# Patient Record
Sex: FEMALE | Race: WHITE | NOT HISPANIC OR LATINO | Employment: OTHER | URBAN - METROPOLITAN AREA
[De-identification: names, ages, dates, MRNs, and addresses within clinical notes are randomized per-mention and may not be internally consistent; named-entity substitution may affect disease eponyms.]

---

## 2017-08-21 ENCOUNTER — HOSPITAL ENCOUNTER (EMERGENCY)
Facility: HOSPITAL | Age: 77
Discharge: HOME | End: 2017-08-21
Attending: EMERGENCY MEDICINE | Admitting: EMERGENCY MEDICINE
Payer: MEDICARE

## 2017-08-21 VITALS
WEIGHT: 135 LBS | TEMPERATURE: 98.3 F | BODY MASS INDEX: 19.99 KG/M2 | RESPIRATION RATE: 16 BRPM | HEART RATE: 80 BPM | HEIGHT: 69 IN | SYSTOLIC BLOOD PRESSURE: 110 MMHG | DIASTOLIC BLOOD PRESSURE: 55 MMHG | OXYGEN SATURATION: 97 %

## 2017-08-21 DIAGNOSIS — I83.891 VARICOSE VEINS OF RIGHT LOWER EXTREMITY WITH EDEMA: Primary | ICD-10-CM

## 2017-08-21 LAB — DEPRECATED D DIMER PPP: <270 NG/ML (FEU) (ref 0–424)

## 2017-08-21 PROCEDURE — 36415 COLL VENOUS BLD VENIPUNCTURE: CPT | Performed by: EMERGENCY MEDICINE

## 2017-08-21 PROCEDURE — 85379 FIBRIN DEGRADATION QUANT: CPT | Performed by: EMERGENCY MEDICINE

## 2017-08-21 PROCEDURE — 99284 EMERGENCY DEPT VISIT MOD MDM: CPT

## 2017-08-21 RX ORDER — ACETAMINOPHEN 325 MG/1
975 TABLET ORAL ONCE
Status: COMPLETED | OUTPATIENT
Start: 2017-08-21 | End: 2017-08-21

## 2017-08-21 RX ORDER — IBUPROFEN 400 MG/1
400 TABLET ORAL ONCE
Status: COMPLETED | OUTPATIENT
Start: 2017-08-21 | End: 2017-08-21

## 2017-08-21 RX ADMIN — IBUPROFEN 400 MG: 400 TABLET ORAL at 23:00

## 2017-08-21 RX ADMIN — ACETAMINOPHEN 975 MG: 325 TABLET ORAL at 22:59

## 2019-09-18 ENCOUNTER — OFFICE VISIT (OUTPATIENT)
Dept: URGENT CARE | Facility: CLINIC | Age: 79
End: 2019-09-18
Payer: COMMERCIAL

## 2019-09-18 VITALS
OXYGEN SATURATION: 94 % | BODY MASS INDEX: 19.7 KG/M2 | WEIGHT: 130 LBS | HEIGHT: 68 IN | RESPIRATION RATE: 18 BRPM | TEMPERATURE: 97.2 F | SYSTOLIC BLOOD PRESSURE: 114 MMHG | DIASTOLIC BLOOD PRESSURE: 58 MMHG | HEART RATE: 71 BPM

## 2019-09-18 DIAGNOSIS — L03.115 CELLULITIS OF RIGHT LOWER EXTREMITY: ICD-10-CM

## 2019-09-18 DIAGNOSIS — R21 RASH: Primary | ICD-10-CM

## 2019-09-18 PROCEDURE — 99203 OFFICE O/P NEW LOW 30 MIN: CPT | Performed by: PHYSICIAN ASSISTANT

## 2019-09-18 PROCEDURE — S9083 URGENT CARE CENTER GLOBAL: HCPCS | Performed by: PHYSICIAN ASSISTANT

## 2019-09-18 RX ORDER — CEPHALEXIN 500 MG/1
500 CAPSULE ORAL EVERY 6 HOURS SCHEDULED
Qty: 28 CAPSULE | Refills: 0 | Status: SHIPPED | OUTPATIENT
Start: 2019-09-18 | End: 2019-09-25

## 2019-09-18 RX ORDER — PREDNISONE 10 MG/1
TABLET ORAL
Qty: 18 TABLET | Refills: 0 | Status: SHIPPED | OUTPATIENT
Start: 2019-09-18 | End: 2021-07-20 | Stop reason: ALTCHOICE

## 2019-09-18 NOTE — PROGRESS NOTES
[facility]        NAME: Tuan Agudelo is a 78 y o  female  : 1940    MRN: [id number]  DATE: 2019  TIME: 5:33 PM    Assessment and Plan   Rash [R21]  1  Rash  predniSONE 10 mg tablet   2  Cellulitis of right lower extremity  cephalexin (KEFLEX) 500 mg capsule         Patient Instructions     Patient Instructions   Continue benadryl for itch, can add zyrtec  If worse go to ER  Follow up with PCP in 1 week  Appears to be allergic reaction  She does have some cellulitis over the lower leg this is likely secondary to her venous stasis  Follow up with PCP in 3-5 days  Proceed to  ER if symptoms worsen  Chief Complaint     Chief Complaint   Patient presents with    Facial Swelling     x 3 days becoming more severe today    Leg Swelling     right leg +erythema and edema with peeling skin    Rash     genralized raised itchy rash         History of Present Illness         58-year-old female presents to the clinic with itching rash on bilateral upper extremities her neck and some swelling on her eyes  She also has a red patch on her right lower leg  She has a history of right lower leg venous stasis with some cellulitis last year  She has not benign medicines for this recently  No topical creams  No new medicines  No new exposures or soaps  No new foods  She has been eating tomatoes but she eats tomatoes every year with her garden  No trouble swallowing or breathing  Review of Systems   Review of Systems   Constitutional: Negative  HENT: Negative  Respiratory: Negative  Cardiovascular: Negative  Gastrointestinal: Negative  Skin: Positive for rash           Current Medications       Current Outpatient Medications:     cephalexin (KEFLEX) 500 mg capsule, Take 1 capsule (500 mg total) by mouth every 6 (six) hours for 7 days, Disp: 28 capsule, Rfl: 0    predniSONE 10 mg tablet, 3 tabs daily for 3 days, 2 tabs daily for 3 days, 1 tab daily for 3 days, Disp: 18 tablet, Rfl: 0    Current Allergies     Allergies as of 09/18/2019    (No Known Allergies)            The following portions of the patient's history were reviewed and updated as appropriate: allergies, current medications, past family history, past medical history, past social history, past surgical history and problem list      History reviewed  No pertinent past medical history  Past Surgical History:   Procedure Laterality Date   [address] [facility]       History reviewed  No pertinent family history  Medications have been verified  Objective   /58 (BP Location: Left arm)   Pulse 71   Temp (!) 97 2 °F (36 2 °C) (Temporal)   Resp 18   Ht 5' 8" (1 727 m)   Wt 59 kg (130 lb)   SpO2 94%   BMI 19 77 kg/m²        Physical Exam     Physical Exam   Constitutional: She is oriented to person, place, and time  She appears well-developed and well-nourished  No distress  Eyes: Pupils are equal, round, and reactive to light  Conjunctivae and EOM are normal    Neck: Normal range of motion  Neck supple  Cardiovascular: Normal rate and regular rhythm  Pulmonary/Chest: Effort normal and breath sounds normal    Neurological: She is alert and oriented to person, place, and time  No cranial nerve deficit  Skin:     Bilateral cheeks and lower lids with erythematous papules and edema  Bilateral upper extremities and lower extremities with erythematous papules no vesicles  She has erythematous papules around her neck  No vesicles  She has a right lower leg large area of excoriation induration erythema warmth and tenderness  There are no pustules or wounds  There is a well-healed scab on the right medial malleolus  No drainage expressed from that scab no tenderness

## 2019-09-27 ENCOUNTER — OFFICE VISIT (OUTPATIENT)
Dept: URGENT CARE | Facility: CLINIC | Age: 79
End: 2019-09-27
Payer: COMMERCIAL

## 2019-09-27 VITALS
WEIGHT: 130 LBS | OXYGEN SATURATION: 96 % | SYSTOLIC BLOOD PRESSURE: 116 MMHG | DIASTOLIC BLOOD PRESSURE: 61 MMHG | TEMPERATURE: 97.6 F | HEART RATE: 93 BPM | RESPIRATION RATE: 18 BRPM | BODY MASS INDEX: 19.7 KG/M2 | HEIGHT: 68 IN

## 2019-09-27 DIAGNOSIS — L03.115 CELLULITIS OF RIGHT ANTERIOR LOWER LEG: Primary | ICD-10-CM

## 2019-09-27 PROCEDURE — 99213 OFFICE O/P EST LOW 20 MIN: CPT | Performed by: PHYSICIAN ASSISTANT

## 2019-09-27 PROCEDURE — S9083 URGENT CARE CENTER GLOBAL: HCPCS | Performed by: PHYSICIAN ASSISTANT

## 2019-09-27 RX ORDER — NYSTATIN 100000 U/G
CREAM TOPICAL 2 TIMES DAILY
COMMUNITY
Start: 2018-12-10 | End: 2021-07-28

## 2019-09-27 RX ORDER — CEPHALEXIN 500 MG/1
500 CAPSULE ORAL EVERY 6 HOURS SCHEDULED
Qty: 28 CAPSULE | Refills: 0 | Status: SHIPPED | OUTPATIENT
Start: 2019-09-27 | End: 2019-10-04

## 2019-09-27 NOTE — PROGRESS NOTES
[facility]        NAME: David Chang is a 78 y o  female  : 1940    MRN: [id number]  DATE: 2019  TIME: 10:36 AM    Assessment and Plan   Cellulitis of right anterior lower leg [L03 115]  1  Cellulitis of right anterior lower leg  cephalexin (KEFLEX) 500 mg capsule         Patient Instructions     Take antibiotic as directed until completed  Motrin and/or Tylenol as needed for aches and pains or any fevers  Follow up with PCP in 3-5 days  Proceed to  ER if symptoms worsen  Chief Complaint     Chief Complaint   Patient presents with    Rash     Pt states that she was in 1 week ago with a rash on her right lower leg, face, neck that is still not getting better  History of Present Illness       75-year-old female presents with rash to right lower leg on the anterior aspect  Reports that the area got red and has increasingly gotten larger over the past couple days  However swelling has improved  Denies any numbness or tingling into extremity  No fevers or chills reported  Denies any chest pain shortness of breath  Denies any abdominal pain nausea vomiting or diarrhea  Rash   This is a new problem  The current episode started 1 to 4 weeks ago  The problem has been gradually worsening since onset  Location: Right lower leg  The rash is characterized by pain and redness  She was exposed to nothing  Pertinent negatives include no fever  Past treatments include nothing  The treatment provided no relief  Review of Systems   Review of Systems   Constitutional: Negative  Negative for fever  Respiratory: Negative  Cardiovascular: Negative  Gastrointestinal: Negative  Musculoskeletal: Negative  Skin: Positive for rash  Neurological: Negative            Current Medications       Current Outpatient Medications:     cephalexin (KEFLEX) 500 mg capsule, Take 1 capsule (500 mg total) by mouth every 6 (six) hours for 7 days, Disp: 28 capsule, Rfl: 0   nystatin (MYCOSTATIN) cream, Apply topically 2 (two) times a day, Disp: , Rfl:     predniSONE 10 mg tablet, 3 tabs daily for 3 days, 2 tabs daily for 3 days, 1 tab daily for 3 days (Patient not taking: Reported on 9/27/2019), Disp: 18 tablet, Rfl: 0    Current Allergies     Allergies as of 09/27/2019    (No Known Allergies)            The following portions of the patient's history were reviewed and updated as appropriate: allergies, current medications, past family history, past medical history, past social history, past surgical history and problem list      History reviewed  No pertinent past medical history  Past Surgical History:   Procedure Laterality Date   [address] [facility]       History reviewed  No pertinent family history  Medications have been verified  Objective   /61   Pulse 93   Temp 97 6 °F (36 4 °C)   Resp 18   Ht 5' 8" (1 727 m)   Wt 59 kg (130 lb)   SpO2 96%   BMI 19 77 kg/m²        Physical Exam     Physical Exam   Constitutional: She is oriented to person, place, and time  She appears well-developed and well-nourished  No distress  HENT:   Head: Normocephalic and atraumatic  Right Ear: External ear normal    Left Ear: External ear normal    Nose: Nose normal    Mouth/Throat: Oropharynx is clear and moist  No oropharyngeal exudate  Eyes: Conjunctivae are normal  Right eye exhibits no discharge  Left eye exhibits no discharge  Neck: Normal range of motion  Neck supple  Cardiovascular: Intact distal pulses  Pulmonary/Chest: Effort normal  No respiratory distress  Musculoskeletal: Normal range of motion  Right lower leg: She exhibits tenderness  She exhibits no bony tenderness, no swelling, no edema, no deformity and no laceration  Legs:  Neurological: She is alert and oriented to person, place, and time  Skin: Skin is warm and dry  Rash noted  There is erythema  Psychiatric: She has a normal mood and affect     Nursing note and vitals reviewed

## 2019-09-27 NOTE — PATIENT INSTRUCTIONS
Take antibiotic as directed until completed  Motrin and/or Tylenol as needed for aches and pains or any fevers  Follow up with PCP in 3-5 days  Proceed to  ER if symptoms worsen  Cellulitis   AMBULATORY CARE:   Cellulitis  is a skin infection caused by bacteria  Cellulitis usually appears on the legs and feet, arms and hands, or face  Common signs and symptoms include the following:   · A red, warm, swollen area on your skin    · Pain when the area is touched    · Bumps or blisters (abscess) that may drain pus    · Bumpy, raised skin that feels like an orange peel  Call 911 if:   · You have sudden trouble breathing or chest pain  Seek care immediately if:   · Your wound gets larger and more painful  · You feel a crackling under your skin when you touch it  · You have purple dots or bumps on your skin, or you see bleeding under your skin  · You have new swelling and pain in your legs  · The red, warm, swollen area gets larger  · You see red streaks coming from the infected area  Contact your healthcare provider if:   · You have a fever  · Your fever or pain does not go away or gets worse  · The area does not get smaller after 2 days of antibiotics  · Your skin is flaking or peeling off  · You have questions or concerns about your condition or care  Treatment for cellulitis  may decrease symptoms, stop the infection from spreading, and cure the infection  Treatment depends on how severe your cellulitis is  Cellulitis may go away on its own  You may  instead need antibiotics to help treat the bacterial infection and medicines for pain  Your healthcare provider may draw a [place] around the edges of your cellulitis  If your cellulitis spreads, your healthcare provider will see it outside of the [place]  Manage your symptoms:   · Elevate the area above the level of your heart  as often as you can  This will help decrease swelling and pain   Prop the area on pillows or blankets to keep it elevated comfortably  · Clean the area daily until the wound scabs over  Gently wash the area with soap and water  Pat dry  Use dressings as directed  · Place cool or warm, wet cloths on the area as directed  Use clean cloths and clean water  Leave it on the area until the cloth is room temperature  Pat the area dry with a clean, dry cloth  The cloths may help decrease pain  Prevent cellulitis:   · Do not scratch bug bites or areas of injury  You increase your risk for cellulitis by scratching these areas  · Do not share personal items, such as towels, clothing, and razors  · Clean exercise equipment  with germ-killing  before and after you use it  · Wash your hands often  Use soap and water  Wash your hands after you use the bathroom, change a child's diapers, or sneeze  Wash your hands before you prepare or eat food  Use lotion to prevent dry, cracked skin  · Wear pressure stockings as directed  You may be told to wear the stockings if you have peripheral edema  The stockings improve blood flow and decrease swelling  · Treat athlete's foot  This can help prevent the spread of a bacterial skin infection  Follow up with your healthcare provider within 3 days, or as directed: Your healthcare provider will check if your cellulitis is getting better  You may need different medicine  Write down your questions so you remember to ask them during your visits  © 2017 [employer] Information is for End User's use only and may not be sold, redistributed or otherwise used for commercial purposes  All illustrations and images included in CareNotes® are the copyrighted property of [employer]  or Shadi Viveros  The above information is an  only  It is not intended as medical advice for individual conditions or treatments   Talk to your doctor, nurse or pharmacist before following any medical regimen to see if it is safe and effective for you

## 2020-02-12 ENCOUNTER — TRANSCRIBE ORDERS (OUTPATIENT)
Dept: MRI IMAGING | Facility: CLINIC | Age: 80
End: 2020-02-12

## 2020-02-12 DIAGNOSIS — F03.90 SENILE DEMENTIA, UNCOMPLICATED (HCC): Primary | ICD-10-CM

## 2020-02-19 ENCOUNTER — HOSPITAL ENCOUNTER (OUTPATIENT)
Dept: MRI IMAGING | Facility: CLINIC | Age: 80
Discharge: HOME | End: 2020-02-19
Payer: COMMERCIAL

## 2020-02-19 DIAGNOSIS — F03.90 SENILE DEMENTIA, UNCOMPLICATED (HCC): ICD-10-CM

## 2020-02-19 PROCEDURE — 70551 MRI BRAIN STEM W/O DYE: CPT

## 2021-02-12 DIAGNOSIS — Z23 ENCOUNTER FOR IMMUNIZATION: ICD-10-CM

## 2021-05-06 ENCOUNTER — APPOINTMENT (EMERGENCY)
Dept: CT IMAGING | Facility: HOSPITAL | Age: 81
End: 2021-05-06
Payer: MEDICARE

## 2021-05-06 ENCOUNTER — HOSPITAL ENCOUNTER (EMERGENCY)
Facility: HOSPITAL | Age: 81
Discharge: HOME | End: 2021-05-07
Attending: EMERGENCY MEDICINE | Admitting: EMERGENCY MEDICINE
Payer: MEDICARE

## 2021-05-06 DIAGNOSIS — N20.0 NEPHROLITHIASIS: Primary | ICD-10-CM

## 2021-05-06 LAB
ALBUMIN SERPL BCP-MCNC: 4.1 G/DL (ref 3.5–5)
ALP SERPL-CCNC: 83 U/L (ref 46–116)
ALT SERPL W P-5'-P-CCNC: 40 U/L (ref 12–78)
ANION GAP SERPL CALCULATED.3IONS-SCNC: 8 MMOL/L (ref 4–13)
AST SERPL W P-5'-P-CCNC: 32 U/L (ref 5–45)
ATRIAL RATE: 64 BPM
BASOPHILS # BLD AUTO: 0.05 THOUSANDS/ΜL (ref 0–0.1)
BASOPHILS NFR BLD AUTO: 0 % (ref 0–1)
BILIRUB SERPL-MCNC: 0.65 MG/DL (ref 0.2–1)
BUN SERPL-MCNC: 26 MG/DL (ref 5–25)
CALCIUM SERPL-MCNC: 9.5 MG/DL (ref 8.3–10.1)
CHLORIDE SERPL-SCNC: 106 MMOL/L (ref 100–108)
CO2 SERPL-SCNC: 29 MMOL/L (ref 21–32)
CREAT SERPL-MCNC: 0.81 MG/DL (ref 0.6–1.3)
EOSINOPHIL # BLD AUTO: 0.03 THOUSAND/ΜL (ref 0–0.61)
EOSINOPHIL NFR BLD AUTO: 0 % (ref 0–6)
ERYTHROCYTE [DISTWIDTH] IN BLOOD BY AUTOMATED COUNT: 14.1 % (ref 11.6–15.1)
GFR SERPL CREATININE-BSD FRML MDRD: 67 ML/MIN/1.73SQ M
GLUCOSE SERPL-MCNC: 131 MG/DL (ref 65–140)
HCT VFR BLD AUTO: 39.1 % (ref 34.8–46.1)
HGB BLD-MCNC: 13.1 G/DL (ref 11.5–15.4)
IMM GRANULOCYTES # BLD AUTO: 0.06 THOUSAND/UL (ref 0–0.2)
IMM GRANULOCYTES NFR BLD AUTO: 1 % (ref 0–2)
LACTATE SERPL-SCNC: 0.8 MMOL/L (ref 0.5–2)
LIPASE SERPL-CCNC: 102 U/L (ref 73–393)
LYMPHOCYTES # BLD AUTO: 0.76 THOUSANDS/ΜL (ref 0.6–4.47)
LYMPHOCYTES NFR BLD AUTO: 6 % (ref 14–44)
MCH RBC QN AUTO: 33.2 PG (ref 26.8–34.3)
MCHC RBC AUTO-ENTMCNC: 33.5 G/DL (ref 31.4–37.4)
MCV RBC AUTO: 99 FL (ref 82–98)
MONOCYTES # BLD AUTO: 0.81 THOUSAND/ΜL (ref 0.17–1.22)
MONOCYTES NFR BLD AUTO: 7 % (ref 4–12)
NEUTROPHILS # BLD AUTO: 10.49 THOUSANDS/ΜL (ref 1.85–7.62)
NEUTS SEG NFR BLD AUTO: 86 % (ref 43–75)
NRBC BLD AUTO-RTO: 0 /100 WBCS
PLATELET # BLD AUTO: 149 THOUSANDS/UL (ref 149–390)
PMV BLD AUTO: 11.1 FL (ref 8.9–12.7)
POTASSIUM SERPL-SCNC: 3.9 MMOL/L (ref 3.5–5.3)
PROT SERPL-MCNC: 7.2 G/DL (ref 6.4–8.2)
QRS AXIS: 76 DEGREES
QRSD INTERVAL: 74 MS
QT INTERVAL: 418 MS
QTC INTERVAL: 421 MS
RBC # BLD AUTO: 3.94 MILLION/UL (ref 3.81–5.12)
SODIUM SERPL-SCNC: 143 MMOL/L (ref 136–145)
T WAVE AXIS: 58 DEGREES
VENTRICULAR RATE: 61 BPM
WBC # BLD AUTO: 12.2 THOUSAND/UL (ref 4.31–10.16)

## 2021-05-06 PROCEDURE — 74177 CT ABD & PELVIS W/CONTRAST: CPT

## 2021-05-06 PROCEDURE — 83690 ASSAY OF LIPASE: CPT | Performed by: EMERGENCY MEDICINE

## 2021-05-06 PROCEDURE — 83605 ASSAY OF LACTIC ACID: CPT | Performed by: EMERGENCY MEDICINE

## 2021-05-06 PROCEDURE — 80053 COMPREHEN METABOLIC PANEL: CPT | Performed by: EMERGENCY MEDICINE

## 2021-05-06 PROCEDURE — 93005 ELECTROCARDIOGRAM TRACING: CPT

## 2021-05-06 PROCEDURE — 93010 ELECTROCARDIOGRAM REPORT: CPT | Performed by: INTERNAL MEDICINE

## 2021-05-06 PROCEDURE — 99282 EMERGENCY DEPT VISIT SF MDM: CPT | Performed by: EMERGENCY MEDICINE

## 2021-05-06 PROCEDURE — 85025 COMPLETE CBC W/AUTO DIFF WBC: CPT | Performed by: EMERGENCY MEDICINE

## 2021-05-06 PROCEDURE — 36415 COLL VENOUS BLD VENIPUNCTURE: CPT | Performed by: EMERGENCY MEDICINE

## 2021-05-06 PROCEDURE — 99284 EMERGENCY DEPT VISIT MOD MDM: CPT

## 2021-05-06 PROCEDURE — 96360 HYDRATION IV INFUSION INIT: CPT

## 2021-05-06 RX ADMIN — SODIUM CHLORIDE 500 ML: 0.9 INJECTION, SOLUTION INTRAVENOUS at 20:31

## 2021-05-06 RX ADMIN — IOHEXOL 100 ML: 350 INJECTION, SOLUTION INTRAVENOUS at 21:53

## 2021-05-07 VITALS
DIASTOLIC BLOOD PRESSURE: 53 MMHG | HEART RATE: 86 BPM | TEMPERATURE: 97.5 F | OXYGEN SATURATION: 94 % | SYSTOLIC BLOOD PRESSURE: 108 MMHG | RESPIRATION RATE: 18 BRPM

## 2021-05-07 LAB
BACTERIA UR QL AUTO: NORMAL /HPF
BILIRUB UR QL STRIP: NEGATIVE
CLARITY UR: CLEAR
COLOR UR: YELLOW
GLUCOSE UR STRIP-MCNC: NEGATIVE MG/DL
HGB UR QL STRIP.AUTO: ABNORMAL
KETONES UR STRIP-MCNC: NEGATIVE MG/DL
LEUKOCYTE ESTERASE UR QL STRIP: ABNORMAL
NITRITE UR QL STRIP: NEGATIVE
NON-SQ EPI CELLS URNS QL MICRO: NORMAL /HPF
PH UR STRIP.AUTO: 6 [PH]
PROT UR STRIP-MCNC: NEGATIVE MG/DL
RBC #/AREA URNS AUTO: NORMAL /HPF
SP GR UR STRIP.AUTO: 1.02 (ref 1–1.03)
UROBILINOGEN UR QL STRIP.AUTO: 0.2 E.U./DL
WBC #/AREA URNS AUTO: NORMAL /HPF

## 2021-05-07 PROCEDURE — 81001 URINALYSIS AUTO W/SCOPE: CPT | Performed by: EMERGENCY MEDICINE

## 2021-05-07 NOTE — ED PROVIDER NOTES
History  Chief Complaint   Patient presents with    Abdominal Pain     pt was outside, bent over and felt a sharp pain in the right lower abdomen  +nausea and vomiting  History provided by:  Patient  Abdominal Pain  Pain location:  R flank and RLQ  Pain quality: sharp    Pain radiates to:  Does not radiate  Pain severity:  Severe  Onset quality:  Sudden  Duration:  1 hour  Timing:  Constant  Progression:  Unchanged  Chronicity:  New  Context comment:  Patient was sitting in chair, bent down to  her dog felt sudden pain right lower quadrant  Relieved by:  None tried  Worsened by:  Nothing  Ineffective treatments:  None tried  Associated symptoms: nausea and vomiting    Associated symptoms: no chest pain, no chills, no constipation, no cough, no diarrhea, no dysuria, no fever, no shortness of breath and no sore throat        Prior to Admission Medications   Prescriptions Last Dose Informant Patient Reported? Taking?   nystatin (MYCOSTATIN) cream   Yes No   Sig: Apply topically 2 (two) times a day   predniSONE 10 mg tablet   No No   Sig: 3 tabs daily for 3 days, 2 tabs daily for 3 days, 1 tab daily for 3 days   Patient not taking: Reported on 9/27/2019      Facility-Administered Medications: None       Past Medical History:   Diagnosis Date    Dementia [facility])        Past Surgical History:   Procedure Laterality Date   [address] [facility]       History reviewed  No pertinent family history  I have reviewed and agree with the history as documented  E-Cigarette/Vaping     E-Cigarette/Vaping Substances     Social History     Tobacco Use    Smoking status: Never Smoker    Smokeless tobacco: Never Used   Substance Use Topics    Alcohol use: No    Drug use: No       Review of Systems   Constitutional: Negative for activity change, chills, diaphoresis and fever  HENT: Negative for congestion, sinus pressure and sore throat  Eyes: Negative for pain and visual disturbance     Respiratory: Negative for cough, chest tightness, shortness of breath, wheezing and stridor  Cardiovascular: Negative for chest pain and palpitations  Gastrointestinal: Positive for abdominal pain, nausea and vomiting  Negative for abdominal distention, constipation and diarrhea  Genitourinary: Negative for dysuria and frequency  Musculoskeletal: Negative for neck pain and neck stiffness  Skin: Negative for rash  Neurological: Negative for dizziness, speech difficulty, light-headedness, numbness and headaches  Physical Exam  Physical Exam  Vitals signs reviewed  Constitutional:       General: She is not in acute distress  Appearance: She is well-developed  She is not diaphoretic  HENT:      Head: Normocephalic and atraumatic  Right Ear: External ear normal       Left Ear: External ear normal       Nose: Nose normal    Eyes:      General:         Right eye: No discharge  Left eye: No discharge  Pupils: Pupils are equal, round, and reactive to light  Neck:      Musculoskeletal: Normal range of motion and neck supple  Trachea: No tracheal deviation  Cardiovascular:      Rate and Rhythm: Normal rate and regular rhythm  Heart sounds: Normal heart sounds  No murmur  Pulmonary:      Effort: Pulmonary effort is normal  No respiratory distress  Breath sounds: Normal breath sounds  No stridor  Abdominal:      General: There is no distension  Palpations: Abdomen is soft  Tenderness: There is no abdominal tenderness  There is right CVA tenderness  There is no guarding or rebound  Musculoskeletal: Normal range of motion  Skin:     General: Skin is warm and dry  Coloration: Skin is not pale  Findings: No erythema  Neurological:      General: No focal deficit present  Mental Status: She is alert and oriented to person, place, and time           Vital Signs  ED Triage Vitals [05/06/21 1952]   Temperature Pulse Respirations Blood Pressure SpO2 97 5 °F (36 4 °C) 74 18 136/62 97 %      Temp Source Heart Rate Source Patient Position - Orthostatic VS BP Location FiO2 (%)   Temporal Monitor Sitting Left arm --      Pain Score       --           Vitals:    05/06/21 2030 05/06/21 2130 05/06/21 2230 05/06/21 2330   BP: 129/63 123/60 108/55 107/58   Pulse: (!) 52 (!) 54 82 66   Patient Position - Orthostatic VS:    Lying         Visual Acuity      ED Medications  Medications   sodium chloride 0 9 % bolus 500 mL (0 mL Intravenous Stopped 5/6/21 2131)   iohexol (OMNIPAQUE) 350 MG/ML injection (MULTI-DOSE) 100 mL (100 mL Intravenous Given 5/6/21 2153)       Diagnostic Studies  Results Reviewed     Procedure Component Value Units Date/Time    UA w Reflex to Microscopic w Reflex to Culture [[id number]] Collected: 05/07/21 0000    Lab Status: No result Specimen: Urine, Clean Catch     Comprehensive metabolic panel [[id number]]  (Abnormal) Collected: 05/06/21 2031    Lab Status: Final result Specimen: Blood from Arm, Left Updated: 05/06/21 2106     Sodium 143 mmol/L      Potassium 3 9 mmol/L      Chloride 106 mmol/L      CO2 29 mmol/L      ANION GAP 8 mmol/L      BUN 26 mg/dL      Creatinine 0 81 mg/dL      Glucose 131 mg/dL      Calcium 9 5 mg/dL      AST 32 U/L      ALT 40 U/L      Alkaline Phosphatase 83 U/L      Total Protein 7 2 g/dL      Albumin 4 1 g/dL      Total Bilirubin 0 65 mg/dL      eGFR 67 ml/min/1 73sq m     Narrative:      Prabhakar guidelines for Chronic Kidney Disease (CKD):     Stage 1 with normal or high GFR (GFR > 90 mL/min/1 73 square meters)    Stage 2 Mild CKD (GFR = 60-89 mL/min/1 73 square meters)    Stage 3A Moderate CKD (GFR = 45-59 mL/min/1 73 square meters)    Stage 3B Moderate CKD (GFR = 30-44 mL/min/1 73 square meters)    Stage 4 Severe CKD (GFR = 15-29 mL/min/1 73 square meters)    Stage 5 End Stage CKD (GFR <15 mL/min/1 73 square meters)  Note: GFR calculation is accurate only with a steady state creatinine Lipase [[id number]]  (Normal) Collected: 05/06/21 2031    Lab Status: Final result Specimen: Blood from Arm, Left Updated: 05/06/21 2106     Lipase 102 u/L     Lactic acid [[id number]]  (Normal) Collected: 05/06/21 2030    Lab Status: Final result Specimen: Blood from Arm, Left Updated: 05/06/21 2059     LACTIC ACID 0 8 mmol/L     Narrative:      Result may be elevated if tourniquet was used during collection  CBC and differential [[id number]]  (Abnormal) Collected: 05/06/21 2030    Lab Status: Final result Specimen: Blood from Arm, Left Updated: 05/06/21 2045     WBC 12 20 Thousand/uL      RBC 3 94 Million/uL      Hemoglobin 13 1 g/dL      Hematocrit 39 1 %      MCV 99 fL      MCH 33 2 pg      MCHC 33 5 g/dL      RDW 14 1 %      MPV 11 1 fL      Platelets 581 Thousands/uL      nRBC 0 /100 WBCs      Neutrophils Relative 86 %      Immat GRANS % 1 %      Lymphocytes Relative 6 %      Monocytes Relative 7 %      Eosinophils Relative 0 %      Basophils Relative 0 %      Neutrophils Absolute 10 49 Thousands/µL      Immature Grans Absolute 0 06 Thousand/uL      Lymphocytes Absolute 0 76 Thousands/µL      Monocytes Absolute 0 81 Thousand/µL      Eosinophils Absolute 0 03 Thousand/µL      Basophils Absolute 0 05 Thousands/µL                  CT abdomen pelvis with contrast   Final Result by Phong Aldana MD (05/06 2304)      1  Moderate right hydronephrosis and perinephric fluid  Findings may represent recently passed calculus versus pyelonephritis, correlate with urinalysis  2   Cholelithiasis without definite evidence of cholecystitis  3   Mild periportal edema may be due to hydration  4   Stool-filled colon compatible with constipation                 Workstation performed: [id number]                    Procedures  Procedures         ED Course                                           MDM  Number of Diagnoses or Management Options  Nephrolithiasis: new and requires workup  Diagnosis management comments: Initial ED assessment:  42-year-old female, sudden-onset right lower quadrant abdominal pain tender in the CVA on examination    Initial DDx includes but is not limited to:   Nephrolithiasis, musculoskeletal pain, AAA, appendicitis felt to be less likely although this is family's major concern    Initial ED plan:   Blood work CT imaging disposition pending ED workup        Final ED summary/disposition:   After evaluation and workup in the emergency department, found have nephrolithiasis that likely past, this fits clinically patient's symptoms  , pain completely resolved  Patient discharged  Amount and/or Complexity of Data Reviewed  Clinical lab tests: ordered and reviewed  Tests in the radiology section of CPT®: ordered and reviewed  Decide to obtain previous medical records or to obtain history from someone other than the patient: yes  Obtain history from someone other than the patient: yes  Review and summarize past medical records: yes  Independent visualization of images, tracings, or specimens: yes        Disposition  Final diagnoses:   Nephrolithiasis     Time reflects when diagnosis was documented in both MDM as applicable and the Disposition within this note     Time User Action Codes Description Comment    5/6/2021 11:54 PM Adele Jerez Add [N20 0] Nephrolithiasis       ED Disposition     ED Disposition Condition Date/Time Comment    Discharge Stable Thu May 6, 2021 11:54 PM Vicky Sahu discharge to home/self care  Follow-up Information    None         Patient's Medications   Discharge Prescriptions    No medications on file     No discharge procedures on file      PDMP Review     None          ED Provider  Electronically Signed by           Becky Dinh DO  05/07/21 0001

## 2021-07-03 ENCOUNTER — HOSPITAL ENCOUNTER (EMERGENCY)
Facility: HOSPITAL | Age: 81
Discharge: HOME | End: 2021-07-03
Attending: EMERGENCY MEDICINE
Payer: MEDICARE

## 2021-07-03 VITALS
HEART RATE: 68 BPM | TEMPERATURE: 97.3 F | OXYGEN SATURATION: 97 % | DIASTOLIC BLOOD PRESSURE: 66 MMHG | RESPIRATION RATE: 18 BRPM | SYSTOLIC BLOOD PRESSURE: 117 MMHG

## 2021-07-03 DIAGNOSIS — S81.801A NON-HEALING WOUND OF RIGHT LOWER EXTREMITY: Primary | ICD-10-CM

## 2021-07-03 PROCEDURE — 99283 EMERGENCY DEPT VISIT LOW MDM: CPT

## 2021-07-03 PROCEDURE — 99284 EMERGENCY DEPT VISIT MOD MDM: CPT | Performed by: PHYSICIAN ASSISTANT

## 2021-07-03 RX ORDER — CEPHALEXIN 250 MG/1
500 CAPSULE ORAL ONCE
Status: COMPLETED | OUTPATIENT
Start: 2021-07-03 | End: 2021-07-03

## 2021-07-03 RX ORDER — CEPHALEXIN 500 MG/1
500 CAPSULE ORAL EVERY 6 HOURS SCHEDULED
Qty: 40 CAPSULE | Refills: 0 | Status: SHIPPED | OUTPATIENT
Start: 2021-07-03 | End: 2021-07-13

## 2021-07-03 RX ADMIN — CEPHALEXIN 500 MG: 250 CAPSULE ORAL at 21:17

## 2021-07-04 NOTE — ED NOTES
Patient walked out of department stable      [facility]  07/03/21 2124

## 2021-07-13 ENCOUNTER — OFFICE VISIT (OUTPATIENT)
Dept: WOUND CARE | Facility: CLINIC | Age: 81
End: 2021-07-13
Payer: MEDICARE

## 2021-07-13 VITALS
BODY MASS INDEX: 19.26 KG/M2 | HEART RATE: 70 BPM | RESPIRATION RATE: 18 BRPM | TEMPERATURE: 96.6 F | WEIGHT: 130 LBS | HEIGHT: 69 IN | DIASTOLIC BLOOD PRESSURE: 62 MMHG | SYSTOLIC BLOOD PRESSURE: 119 MMHG

## 2021-07-13 DIAGNOSIS — I87.311 CHRONIC VENOUS HYPERTENSION (IDIOPATHIC) WITH ULCER OF RIGHT LOWER EXTREMITY (HCC): Primary | ICD-10-CM

## 2021-07-13 DIAGNOSIS — L97.919 CHRONIC VENOUS HYPERTENSION (IDIOPATHIC) WITH ULCER OF RIGHT LOWER EXTREMITY (HCC): Primary | ICD-10-CM

## 2021-07-13 PROCEDURE — 11042 DBRDMT SUBQ TIS 1ST 20SQCM/<: CPT | Performed by: FAMILY MEDICINE

## 2021-07-13 PROCEDURE — 99213 OFFICE O/P EST LOW 20 MIN: CPT | Performed by: FAMILY MEDICINE

## 2021-07-13 PROCEDURE — 99203 OFFICE O/P NEW LOW 30 MIN: CPT | Performed by: FAMILY MEDICINE

## 2021-07-13 RX ORDER — LIDOCAINE HYDROCHLORIDE 40 MG/ML
5 SOLUTION TOPICAL ONCE
Status: COMPLETED | OUTPATIENT
Start: 2021-07-13 | End: 2021-07-13

## 2021-07-13 RX ADMIN — LIDOCAINE HYDROCHLORIDE 5 ML: 40 SOLUTION TOPICAL at 14:10

## 2021-07-13 NOTE — PROGRESS NOTES
Patient ID: Lady Quintanilla is a 78 y o  female Date of Birth 1940     Chief Complaint  Chief Complaint   Patient presents with    New Patient Visit     RLE wound       Allergies  Patient has no known allergies  Assessment:    Chronic venous hypertension (idiopathic) with ulcer of right lower extremity (HCC)  Initial evaluation   Debrided as below   Wound management silver alginate, changing  Every other day  ABIs obtained today   discussed the importance of edema reduction via compression and elevation  Compression Tubigrip, consider increasing compression if wound does not improve next week   Keep leg elevated as as possible  Followup in 1 week or call sooner with questions       Diagnoses and all orders for this visit:    Chronic venous hypertension (idiopathic) with ulcer of right lower extremity (HCC)  -     lidocaine (XYLOCAINE) 4 % topical solution 5 mL  -     Wound cleansing and dressings; Future  -     Wound compression and edema control; Future  -     Debridement              Debridement   Wound 07/13/21 Venous Ulcer Pretibial Distal;Right    Universal Protocol:  Consent: Verbal consent obtained  Consent given by: patient  Time out: Immediately prior to procedure a "time out" was called to verify the correct patient, procedure, equipment, support staff and site/side marked as required    Timeout called at: 7/13/2021 2:15 PM   Patient understanding: patient states understanding of the procedure being performed  Patient identity confirmed: verbally with patient      Performed by: physician  Debridement type: surgical  Level of debridement: subcutaneous tissue  Pain control: lidocaine 4%  Post-debridement measurements  Length (cm): 2 5  Width (cm): 1 9  Depth (cm): 0 2  Percent debrided: 100%  Surface Area (cm^2): 4 75  Area debrided (cm^2): 4 75  Volume (cm^3): 0 95  Tissue and other material debrided: subcutaneous tissue  Devitalized tissue debrided: exudate and slough  Instrument(s) utilized: curette  Bleeding: small  Hemostasis obtained with: pressure  Procedural pain (0-10): 0  Post-procedural pain: 0   Response to treatment: procedure was tolerated well          Plan:     Wound 07/13/21 Venous Ulcer Pretibial Distal;Right (Active)   Wound Image Images linked 07/13/21 1441   Wound Description Pink;Yellow 07/13/21 1411   Vandana-wound Assessment Pink; Hyperpigmented 07/13/21 1411   Wound Length (cm) 2 5 cm 07/13/21 1411   Wound Width (cm) 1 9 cm 07/13/21 1411   Wound Depth (cm) 0 1 cm 07/13/21 1411   Wound Surface Area (cm^2) 4 75 cm^2 07/13/21 1411   Wound Volume (cm^3) 0 475 cm^3 07/13/21 1411   Calculated Wound Volume (cm^3) 0 48 cm^3 07/13/21 1411   Drainage Amount Moderate 07/13/21 1411   Drainage Description Serosanguineous 07/13/21 1411   Non-staged Wound Description Full thickness 07/13/21 1411   Dressing Status Intact 07/13/21 1411       Wound 07/13/21 Venous Ulcer Pretibial Distal;Right (Active)   Date First Assessed/Time First Assessed: 07/13/21 1410   Primary Wound Type: Venous Ulcer  Location: Pretibial  Wound Location Orientation: Distal;Right       Subjective:        Patient presents for initial evaluation of right lower extremity wound that has been present after fall about 4-6 weeks ago  Patient was seen in the ED 10 days ago and was given a course of Keflex over concern for the wound being infected and she was referred here to the wound management center  Patient has no known past medical history and does not take any medications  Patient spends most of her time on her feet during the day, does usually wear compression stockings but not always  Have been using Adaptic and silver alginate, changing the dressing every day for about the past 2 weeks  The following portions of the patient's history were reviewed and updated as appropriate: She  has a past medical history of Dementia ([address] )    She   Patient Active Problem List    Diagnosis Date Noted    Chronic venous hypertension (idiopathic) with ulcer of right lower extremity ([address] ) 07/13/2021    Non-healing wound of right lower extremity 07/03/2021     She  has a past surgical history that includes Ankle fracture surgery (1996)  She  reports that she has never smoked  She has never used smokeless tobacco  She reports that she does not drink alcohol and does not use drugs  Current Outpatient Medications   Medication Sig Dispense Refill    cephalexin (KEFLEX) 500 mg capsule Take 1 capsule (500 mg total) by mouth every 6 (six) hours for 10 days 40 capsule 0    nystatin (MYCOSTATIN) cream Apply topically 2 (two) times a day      predniSONE 10 mg tablet 3 tabs daily for 3 days, 2 tabs daily for 3 days, 1 tab daily for 3 days (Patient not taking: Reported on 9/27/2019) 18 tablet 0     No current facility-administered medications for this visit  She has No Known Allergies       Review of Systems   Constitutional: Negative for chills and fever  HENT: Negative for congestion and sneezing  Respiratory: Negative for cough  Cardiovascular: Positive for leg swelling  Musculoskeletal: Positive for gait problem  Skin: Positive for wound  Psychiatric/Behavioral: Negative for agitation  Objective:       Wound 07/13/21 Venous Ulcer Pretibial Distal;Right (Active)   Wound Image Images linked 07/13/21 1441   Wound Description Pink;Yellow 07/13/21 1411   Vandana-wound Assessment Pink; Hyperpigmented 07/13/21 1411   Wound Length (cm) 2 5 cm 07/13/21 1411   Wound Width (cm) 1 9 cm 07/13/21 1411   Wound Depth (cm) 0 1 cm 07/13/21 1411   Wound Surface Area (cm^2) 4 75 cm^2 07/13/21 1411   Wound Volume (cm^3) 0 475 cm^3 07/13/21 1411   Calculated Wound Volume (cm^3) 0 48 cm^3 07/13/21 1411   Drainage Amount Moderate 07/13/21 1411   Drainage Description Serosanguineous 07/13/21 1411   Non-staged Wound Description Full thickness 07/13/21 1411   Dressing Status Intact 07/13/21 1411       /62   Pulse 70   Temp (!) 96 6 °F (35 9 °C) Resp 18   Ht 5' 9" (1 753 m)   Wt 59 kg (130 lb)   BMI 19 20 kg/m²     Physical Exam  Constitutional:       General: She is not in acute distress  Appearance: Normal appearance  She is not ill-appearing, toxic-appearing or diaphoretic  HENT:      Head: Normocephalic and atraumatic  Right Ear: External ear normal       Left Ear: External ear normal    Eyes:      Conjunctiva/sclera: Conjunctivae normal    Pulmonary:      Effort: Pulmonary effort is normal  No respiratory distress  Musculoskeletal:      Cervical back: Neck supple  Right lower leg: Edema present  Left lower leg: Edema present  Skin:     Comments: See wound assessment   Neurological:      Mental Status: She is alert  Psychiatric:         Mood and Affect: Mood normal          Behavior: Behavior normal            Wound Instructions:  Orders Placed This Encounter   Procedures    Wound cleansing and dressings     Right leg:    Wash your hands with soap and water  Remove old dressing, discard into plastic bag and place in trash  Cleanse the wound with normal saline solution or mild unscented dove soap and water prior to applying a clean dressing  Do not use tissue or cotton balls  Do not scrub the wound  Pat dry using gauze  May shower every other day    Apply silver alginate to the wound  Cover with gauze, rolled gauize and secure with tape  Spandagrip F compression on at all times except when dressing is getting changed  Change dressing every other day    Wound debrided today  Wound must be covered at all times  Please try to have 3-4 servings of protein per day  Chicken, fish, nuts, yogurt etc and continue your protein shakes  Until your wound is healed you should keep your leg up as much as possible  While you are seated please keep your feet up  You can still walk  This was done today at the wound care center       Standing Status:   Future     Standing Expiration Date:   7/13/2022    Wound compression and edema control     Elastic Tubular Stocking  Spandagrip F    Tubular elastic bandage: Apply from base of toes to behind the knee  Apply in AM, may remove for sleep  Avoid prolonged standing in one place  Elevate leg(s) above the level of the heart when sitting or as much as possible  Standing Status:   Future     Standing Expiration Date:   7/13/2022    Debridement     This order was created via procedure documentation        Diagnosis ICD-10-CM Associated Orders   1   Chronic venous hypertension (idiopathic) with ulcer of right lower extremity (HCC)  I87 311 lidocaine (XYLOCAINE) 4 % topical solution 5 mL    L97 919 Wound cleansing and dressings     Wound compression and edema control     Debridement

## 2021-07-13 NOTE — PATIENT INSTRUCTIONS
Orders Placed This Encounter   Procedures    Wound cleansing and dressings     Right leg:    Wash your hands with soap and water  Remove old dressing, discard into plastic bag and place in trash  Cleanse the wound with normal saline solution or mild unscented dove soap and water prior to applying a clean dressing  Do not use tissue or cotton balls  Do not scrub the wound  Pat dry using gauze  May shower every other day    Apply silver alginate to the wound  Cover with gauze, rolled gauize and secure with tape  Wear own compression stocking  Change dressing every other day    Wound debrided today  Wound must be covered at all times  Please try to have 3-4 servings of protein per day  Chicken, fish, nuts, yogurt etc and continue your protein shakes  Until your wound is healed you should keep your leg up as much as possible  While you are seated please keep your feet up  You can still walk  This was done today at the wound care center  Standing Status:   Future     Standing Expiration Date:   7/13/2022    Wound compression and edema control     May wear own compression stocking    Avoid prolonged standing in one place  Elevate leg(s) above the level of the heart when sitting or as much as possible       Standing Status:   Future     Standing Expiration Date:   7/13/2022

## 2021-07-13 NOTE — ASSESSMENT & PLAN NOTE
Initial evaluation   Debrided as below   Wound management silver alginate, changing  Every other day  ABIs obtained today   discussed the importance of edema reduction via compression and elevation  Compression Tubigrip, consider increasing compression if wound does not improve next week   Keep leg elevated as as possible  Followup in 1 week or call sooner with questions

## 2021-07-20 ENCOUNTER — OFFICE VISIT (OUTPATIENT)
Dept: WOUND CARE | Facility: CLINIC | Age: 81
End: 2021-07-20
Payer: MEDICARE

## 2021-07-20 VITALS
DIASTOLIC BLOOD PRESSURE: 82 MMHG | HEART RATE: 76 BPM | RESPIRATION RATE: 18 BRPM | SYSTOLIC BLOOD PRESSURE: 119 MMHG | TEMPERATURE: 98.1 F

## 2021-07-20 DIAGNOSIS — I87.311 CHRONIC VENOUS HYPERTENSION (IDIOPATHIC) WITH ULCER OF RIGHT LOWER EXTREMITY (HCC): Primary | ICD-10-CM

## 2021-07-20 DIAGNOSIS — L97.919 CHRONIC VENOUS HYPERTENSION (IDIOPATHIC) WITH ULCER OF RIGHT LOWER EXTREMITY (HCC): Primary | ICD-10-CM

## 2021-07-20 PROCEDURE — 11042 DBRDMT SUBQ TIS 1ST 20SQCM/<: CPT | Performed by: FAMILY MEDICINE

## 2021-07-20 RX ORDER — LIDOCAINE HYDROCHLORIDE 40 MG/ML
5 SOLUTION TOPICAL ONCE
Status: COMPLETED | OUTPATIENT
Start: 2021-07-20 | End: 2021-07-20

## 2021-07-20 RX ADMIN — LIDOCAINE HYDROCHLORIDE 5 ML: 40 SOLUTION TOPICAL at 08:24

## 2021-07-20 NOTE — ASSESSMENT & PLAN NOTE
Wound is improved   Debrided as below   Continue wound management with silver alginate, hold the  Adaptic unless unable to remove the silver alginate even wetting it  Continue compression with tubigrip   Keep legs elevated as much as possible  Follow up in 2 weeks or call sooner with questions/concerns

## 2021-07-20 NOTE — PATIENT INSTRUCTIONS
Orders Placed This Encounter   Procedures    Wound cleansing and dressings     Right leg:     Wash your hands with soap and water  Remove old dressing, discard into plastic bag and place in trash  Cleanse the wound with normal saline solution or mild unscented dove soap and water prior to applying a clean dressing  Do not use tissue or cotton balls  Do not scrub the wound  Pat dry using gauze      May shower every other day (on dressing change days)     Apply silver alginate to the wound, (if dressing is sticking then can use adaptic to the wound before the silver alginate)  Cover with gauze, rolled gauize and secure with tape  Spandagrip F compression on at all times except when dressing is getting changed       Change dressing every other day     Wound debrided today  Wound must be covered at all times            Please try to have 3-4 servings of protein per day  Chicken, fish, nuts, yogurt etc and continue your protein shakes       Until your wound is healed you should keep your leg up as much as possible  While you are seated please keep your feet up  You can still walk      This was done today at the wound care center                   Wound compression and edema control      Elastic Tubular Stocking  Spandagrip F     Tubular elastic bandage: Apply from base of toes to behind the knee  Apply in AM, may remove for sleep      Avoid prolonged standing in one place      Elevate leg(s) above the level of the heart when sitting or as much as possible       Standing Status:   Future     Standing Expiration Date:   7/20/2022

## 2021-07-20 NOTE — PROGRESS NOTES
Patient ID: Lori Woods is a 78 y o  female Date of Birth 1940     Chief Complaint  Chief Complaint   Patient presents with    Follow Up Wound Care Visit     ulcer right lower extremity       Allergies  Patient has no known allergies  Assessment:    Chronic venous hypertension (idiopathic) with ulcer of right lower extremity (HCC)   Wound is improved   Debrided as below   Continue wound management with silver alginate, hold the  Adaptic unless unable to remove the silver alginate even wetting it  Continue compression with tubigrip   Keep legs elevated as much as possible  Follow up in 2 weeks or call sooner with questions/concerns  Diagnoses and all orders for this visit:    Chronic venous hypertension (idiopathic) with ulcer of right lower extremity (HCC)  -     lidocaine (XYLOCAINE) 4 % topical solution 5 mL  -     Wound cleansing and dressings; Future  -     Debridement              Debridement   Wound 07/13/21 Venous Ulcer Pretibial Distal;Right    Universal Protocol:  Consent: Verbal consent obtained  Consent given by: patient  Time out: Immediately prior to procedure a "time out" was called to verify the correct patient, procedure, equipment, support staff and site/side marked as required    Timeout called at: 7/20/2021 8:35 AM   Patient understanding: patient states understanding of the procedure being performed  Patient identity confirmed: verbally with patient      Performed by: physician  Debridement type: surgical  Level of debridement: subcutaneous tissue  Pain control: lidocaine 4%  Post-debridement measurements  Length (cm): 2 5  Width (cm): 1 1  Depth (cm): 0 2  Percent debrided: 100%  Surface Area (cm^2): 2 75  Area debrided (cm^2): 2 75  Volume (cm^3): 0 55  Tissue and other material debrided: subcutaneous tissue  Devitalized tissue debrided: exudate and slough  Instrument(s) utilized: curette  Bleeding: small  Hemostasis obtained with: pressure  Procedural pain (0-10): 0  Post-procedural pain: 0   Response to treatment: procedure was tolerated well          Plan:     Wound 07/13/21 Venous Ulcer Pretibial Distal;Right (Active)   Wound Image Images linked 07/20/21 0848   Wound Description Pink;Yellow;Slough 07/20/21 0823   Vandana-wound Assessment Pink; Hyperpigmented 07/20/21 0823   Wound Length (cm) 2 5 cm 07/20/21 0823   Wound Width (cm) 1 1 cm 07/20/21 0823   Wound Depth (cm) 0 1 cm 07/20/21 0823   Wound Surface Area (cm^2) 2 75 cm^2 07/20/21 0823   Wound Volume (cm^3) 0 275 cm^3 07/20/21 0823   Calculated Wound Volume (cm^3) 0 28 cm^3 07/20/21 0823   Change in Wound Size % 41 67 07/20/21 0823   Drainage Amount Moderate 07/20/21 0823   Drainage Description Serosanguineous 07/20/21 0823   Non-staged Wound Description Full thickness 07/20/21 0823   Dressing Status Intact 07/20/21 0823       Wound 07/13/21 Venous Ulcer Pretibial Distal;Right (Active)   Date First Assessed/Time First Assessed: 07/13/21 1410   Primary Wound Type: Venous Ulcer  Location: Pretibial  Wound Location Orientation: Distal;Right       Subjective:        Patient presents for followup of right lower extremity venous ulcer  No increased pain or drainage  Has been using Silver alginate on the wound which was sticking slightly so they placed initial layer Adaptic  Using Tubigrip for compression  The following portions of the patient's history were reviewed and updated as appropriate: She  has a past medical history of Dementia ([address] )  She   Patient Active Problem List    Diagnosis Date Noted    Chronic venous hypertension (idiopathic) with ulcer of right lower extremity ([address] ) 07/13/2021    Non-healing wound of right lower extremity 07/03/2021     She  reports that she has never smoked  She has never used smokeless tobacco  She reports that she does not drink alcohol and does not use drugs  She has No Known Allergies       Review of Systems   Constitutional: Negative for chills and fever     HENT: Negative for congestion and sneezing  Respiratory: Negative for cough  Cardiovascular: Positive for leg swelling  Skin: Positive for wound  Psychiatric/Behavioral: Negative for agitation  Objective:       Wound 07/13/21 Venous Ulcer Pretibial Distal;Right (Active)   Wound Image Images linked 07/20/21 0848   Wound Description Pink;Yellow;Slough 07/20/21 0823   Vandana-wound Assessment Pink; Hyperpigmented 07/20/21 0823   Wound Length (cm) 2 5 cm 07/20/21 0823   Wound Width (cm) 1 1 cm 07/20/21 0823   Wound Depth (cm) 0 1 cm 07/20/21 0823   Wound Surface Area (cm^2) 2 75 cm^2 07/20/21 0823   Wound Volume (cm^3) 0 275 cm^3 07/20/21 0823   Calculated Wound Volume (cm^3) 0 28 cm^3 07/20/21 0823   Change in Wound Size % 41 67 07/20/21 0823   Drainage Amount Moderate 07/20/21 0823   Drainage Description Serosanguineous 07/20/21 0823   Non-staged Wound Description Full thickness 07/20/21 0823   Dressing Status Intact 07/20/21 0823       /82   Pulse 76   Temp 98 1 °F (36 7 °C)   Resp 18     Physical Exam        Wound 07/13/21 Venous Ulcer Pretibial Distal;Right (Active)   Wound Image   07/20/21 0848   Wound Description Pink;Yellow;Slough 07/20/21 0823   Avndana-wound Assessment Pink; Hyperpigmented 07/20/21 0823   Wound Length (cm) 2 5 cm 07/20/21 0823   Wound Width (cm) 1 1 cm 07/20/21 0823   Wound Depth (cm) 0 1 cm 07/20/21 0823   Wound Surface Area (cm^2) 2 75 cm^2 07/20/21 0823   Wound Volume (cm^3) 0 275 cm^3 07/20/21 0823   Calculated Wound Volume (cm^3) 0 28 cm^3 07/20/21 0823   Change in Wound Size % 41 67 07/20/21 0823   Drainage Amount Moderate 07/20/21 0823   Drainage Description Serosanguineous 07/20/21 0823   Non-staged Wound Description Full thickness 07/20/21 0823   Dressing Status Intact 07/20/21 0823                       Wound Instructions:  Orders Placed This Encounter   Procedures    Wound cleansing and dressings     Right leg:     Wash your hands with soap and water    Remove old dressing, discard into plastic bag and place in trash  Cleanse the wound with normal saline solution or mild unscented dove soap and water prior to applying a clean dressing  Do not use tissue or cotton balls  Do not scrub the wound  Pat dry using gauze      May shower every other day (on dressing change days)     Apply silver alginate to the wound, (if dressing is sticking then can use adaptic to the wound before the silver alginate)  Cover with gauze, rolled gauize and secure with tape  Spandagrip F compression on at all times except when dressing is getting changed       Change dressing every other day     Wound debrided today  Wound must be covered at all times            Please try to have 3-4 servings of protein per day  Chicken, fish, nuts, yogurt etc and continue your protein shakes       Until your wound is healed you should keep your leg up as much as possible  While you are seated please keep your feet up  You can still walk      This was done today at the wound care center                   Wound compression and edema control      Elastic Tubular Stocking  Spandagrip F     Tubular elastic bandage: Apply from base of toes to behind the knee  Apply in AM, may remove for sleep      Avoid prolonged standing in one place      Elevate leg(s) above the level of the heart when sitting or as much as possible  Standing Status:   Future     Standing Expiration Date:   7/20/2022    Debridement     This order was created via procedure documentation        Diagnosis ICD-10-CM Associated Orders   1   Chronic venous hypertension (idiopathic) with ulcer of right lower extremity (HCC)  I87 311 lidocaine (XYLOCAINE) 4 % topical solution 5 mL    L97 919 Wound cleansing and dressings     Debridement

## 2021-07-28 ENCOUNTER — OFFICE VISIT (OUTPATIENT)
Dept: FAMILY MEDICINE CLINIC | Facility: CLINIC | Age: 81
End: 2021-07-28
Payer: MEDICARE

## 2021-07-28 VITALS
BODY MASS INDEX: 18.51 KG/M2 | OXYGEN SATURATION: 99 % | DIASTOLIC BLOOD PRESSURE: 64 MMHG | SYSTOLIC BLOOD PRESSURE: 122 MMHG | HEART RATE: 96 BPM | TEMPERATURE: 98.3 F | HEIGHT: 69 IN | WEIGHT: 125 LBS

## 2021-07-28 DIAGNOSIS — E44.1 MILD PROTEIN-CALORIE MALNUTRITION (HCC): ICD-10-CM

## 2021-07-28 DIAGNOSIS — Z13.220 SCREENING, LIPID: ICD-10-CM

## 2021-07-28 DIAGNOSIS — L97.919 CHRONIC VENOUS HYPERTENSION (IDIOPATHIC) WITH ULCER OF RIGHT LOWER EXTREMITY (HCC): Primary | ICD-10-CM

## 2021-07-28 DIAGNOSIS — Z13.1 SCREENING FOR DIABETES MELLITUS: ICD-10-CM

## 2021-07-28 DIAGNOSIS — I87.311 CHRONIC VENOUS HYPERTENSION (IDIOPATHIC) WITH ULCER OF RIGHT LOWER EXTREMITY (HCC): Primary | ICD-10-CM

## 2021-07-28 DIAGNOSIS — Z00.00 MEDICARE ANNUAL WELLNESS VISIT, SUBSEQUENT: ICD-10-CM

## 2021-07-28 PROBLEM — IMO0001 PATIENT IS JEHOVAH'S WITNESS: Status: ACTIVE | Noted: 2021-07-28

## 2021-07-28 PROBLEM — Z78.9 PATIENT IS JEHOVAH'S WITNESS: Status: ACTIVE | Noted: 2021-07-28

## 2021-07-28 PROBLEM — S81.801A NON-HEALING WOUND OF RIGHT LOWER EXTREMITY: Status: RESOLVED | Noted: 2021-07-03 | Resolved: 2021-07-28

## 2021-07-28 PROCEDURE — 1123F ACP DISCUSS/DSCN MKR DOCD: CPT | Performed by: FAMILY MEDICINE

## 2021-07-28 PROCEDURE — G0439 PPPS, SUBSEQ VISIT: HCPCS | Performed by: FAMILY MEDICINE

## 2021-07-28 NOTE — PATIENT INSTRUCTIONS
Medicare Preventive Visit Patient Instructions  Thank you for completing your Welcome to Medicare Visit or Medicare Annual Wellness Visit today  Your next wellness visit will be due in one year (7/29/2022)  The screening/preventive services that you may require over the next 5-10 years are detailed below  Some tests may not apply to you based off risk factors and/or age  Screening tests ordered at today's visit but not completed yet may show as past due  Also, please note that scanned in results may not display below  Preventive Screenings:  Service Recommendations Previous Testing/Comments   Colorectal Cancer Screening  * Colonoscopy    * Fecal Occult Blood Test (FOBT)/Fecal Immunochemical Test (FIT)  * Fecal DNA/Cologuard Test  * Flexible Sigmoidoscopy Age: 54-65 years old   Colonoscopy: every 10 years (may be performed more frequently if at higher risk)  OR  FOBT/FIT: every 1 year  OR  Cologuard: every 3 years  OR  Sigmoidoscopy: every 5 years  Screening may be recommended earlier than age 48 if at higher risk for colorectal cancer  Also, an individualized decision between you and your healthcare provider will decide whether screening between the ages of 74-80 would be appropriate  Colonoscopy: Not on file  FOBT/FIT: Not on file  Cologuard: Not on file  Sigmoidoscopy: Not on file          Breast Cancer Screening Age: 36 years old  Frequency: every 1-2 years  Not required if history of left and right mastectomy Mammogram: Not on file        Cervical Cancer Screening Between the ages of 21-29, pap smear recommended once every 3 years  Between the ages of 33-67, can perform pap smear with HPV co-testing every 5 years     Recommendations may differ for women with a history of total hysterectomy, cervical cancer, or abnormal pap smears in past  Pap Smear: Not on file    Screening Not Indicated   Hepatitis C Screening Once for adults born between [facility]  More frequently in patients at high risk for Hepatitis C Hep C Antibody: Not on file        Diabetes Screening 1-2 times per year if you're at risk for diabetes or have pre-diabetes Fasting glucose: No results in last 5 years   A1C: No results in last 5 years    Screening Current   Cholesterol Screening Once every 5 years if you don't have a lipid disorder  May order more often based on risk factors  Lipid panel: 10/14/2019    Screening Current     Other Preventive Screenings Covered by Medicare:  1  Abdominal Aortic Aneurysm (AAA) Screening: covered once if your at risk  You're considered to be at risk if you have a family history of AAA  2  Lung Cancer Screening: covers low dose CT scan once per year if you meet all of the following conditions: (1) Age 50-69; (2) No signs or symptoms of lung cancer; (3) Current smoker or have quit smoking within the last 15 years; (4) You have a tobacco smoking history of at least 30 pack years (packs per day multiplied by number of years you smoked); (5) You get a written order from a healthcare provider  3  Glaucoma Screening: covered annually if you're considered high risk: (1) You have diabetes OR (2) Family history of glaucoma OR (3)  aged 48 and older OR (3)  American aged 72 and older  3  Osteoporosis Screening: covered every 2 years if you meet one of the following conditions: (1) You're estrogen deficient and at risk for osteoporosis based off medical history and other findings; (2) Have a vertebral abnormality; (3) On glucocorticoid therapy for more than 3 months; (4) Have primary hyperparathyroidism; (5) On osteoporosis medications and need to assess response to drug therapy  · Last bone density test (DXA Scan): Not on file  5  HIV Screening: covered annually if you're between the age of 12-76  Also covered annually if you are younger than 13 and older than 72 with risk factors for HIV infection   For pregnant patients, it is covered up to 3 times per pregnancy  Immunizations:  Immunization Recommendations   Influenza Vaccine Annual influenza vaccination during flu season is recommended for all persons aged >= 6 months who do not have contraindications   Pneumococcal Vaccine (Prevnar and Pneumovax)  * Prevnar = PCV13  * Pneumovax = PPSV23   Adults 25-60 years old: 1-3 doses may be recommended based on certain risk factors  Adults 72 years old: Prevnar (PCV13) vaccine recommended followed by Pneumovax (PPSV23) vaccine  If already received PPSV23 since turning 65, then PCV13 recommended at least one year after PPSV23 dose  Hepatitis B Vaccine 3 dose series if at intermediate or high risk (ex: diabetes, end stage renal disease, liver disease)   Tetanus (Td) Vaccine - COST NOT COVERED BY MEDICARE PART B Following completion of primary series, a booster dose should be given every 10 years to maintain immunity against tetanus  Td may also be given as tetanus wound prophylaxis  Tdap Vaccine - COST NOT COVERED BY MEDICARE PART B Recommended at least once for all adults  For pregnant patients, recommended with each pregnancy  Shingles Vaccine (Shingrix) - COST NOT COVERED BY MEDICARE PART B  2 shot series recommended in those aged 48 and above     Health Maintenance Due:  There are no preventive care reminders to display for this patient  Immunizations Due:      Topic Date Due    COVID-19 Vaccine (1) Never done    DTaP,Tdap,and Td Vaccines (1 - Tdap) Never done    Pneumococcal Vaccine: 65+ Years (1 of 1 - PPSV23) Never done    Influenza Vaccine (1) 09/01/2021     Advance Directives   What are advance directives? Advance directives are legal documents that state your wishes and plans for medical care  These plans are made ahead of time in case you lose your ability to make decisions for yourself  Advance directives can apply to any medical decision, such as the treatments you want, and if you want to donate organs     What are the types of advance directives? There are many types of advance directives, and each state has rules about how to use them  You may choose a combination of any of the following:  · Living will: This is a written record of the treatment you want  You can also choose which treatments you do not want, which to limit, and which to stop at a certain time  This includes surgery, medicine, IV fluid, and tube feedings  · Durable power of  for healthcare [facility]): This is a written record that states who you want to make healthcare choices for you when you are unable to make them for yourself  This person, called a proxy, is usually a family member or a friend  You may choose more than 1 proxy  · Do not resuscitate (DNR) order:  A DNR order is used in case your heart stops beating or you stop breathing  It is a request not to have certain forms of treatment, such as CPR  A DNR order may be included in other types of advance directives  · Medical directive: This covers the care that you want if you are in a coma, near death, or unable to make decisions for yourself  You can list the treatments you want for each condition  Treatment may include pain medicine, surgery, blood transfusions, dialysis, IV or tube feedings, and a ventilator (breathing machine)  · Values history: This document has questions about your views, beliefs, and how you feel and think about life  This information can help others choose the care that you would choose  Why are advance directives important? An advance directive helps you control your care  Although spoken wishes may be used, it is better to have your wishes written down  Spoken wishes can be misunderstood, or not followed  Treatments may be given even if you do not want them  An advance directive may make it easier for your family to make difficult choices about your care     Underweight  Underweight is defined as having a body mass index (BMI) of less than 18 5 kg/m2   Anorexia  is a loss of appetite, decreased food intake, or both  Your appetite naturally decreases as you get older  You also get full faster than you used to  This occurs because your body needs less energy  Other body changes can also lead to a decreased appetite  Even though some appetite loss is normal, you still need to get enough calories and nutrients to keep you healthy  You can start to lose too much weight if you do not eat as much food as your body needs  Unwanted weight loss can cause health problems, or worsen health problems you already have  You can also become dehydrated if you do not drink enough liquid  How to eat healthy and get enough nutrients:   · Choose healthy foods  Eat a variety of fruits, vegetables, whole grains, low-fat dairy foods, lean meats, and other protein foods  Limit foods high in fat, sugar, and salt  Limit or avoid alcohol as directed  Work with a dietitian to help you plan your meals if you need to follow a special diet  A dietitian can also teach you how to modify foods if you have trouble chewing or swallowing  · Snack on healthy foods between meals  if you only eat a small amount during meals  Snacks provide extra healthy nutrients and calories between meals  Examples include fruit, cheese, and whole grain crackers  · Drink liquids as directed  to avoid dehydration  Drink liquids between meals if they cause you to get full too quickly during meals  Ask how much liquid to drink each day and which liquids are best for you  · Use herbs, spices, and flavor enhancers to add flavor to foods  Avoid using herbs and spice blends that also contain sodium  Ask your healthcare provider or dietitian about flavor enhancers  Flavor enhancers with ham, natural montes de oca, and roast beef flavors can also be sprinkled on food to add flavor  · Share meals with others as often as you can  Eating with others may help you to eat better during meal time   Ask family members, neighbors, or friends to join you for lunch  There are also senior centers where you can meet people, and share meals with them  · Ask family and friends for help  with shopping or preparing foods  Ask for a ride to the grocery store, if needed  © Copyright [employer] 2018 Information is for End User's use only and may not be sold, redistributed or otherwise used for commercial purposes   All illustrations and images included in CareNotes® are the copyrighted property of A D A M , Inc  or [address] [place] [address]

## 2021-07-28 NOTE — PROGRESS NOTES
Assessment and Plan:     Problem List Items Addressed This Visit        Cardiovascular and Mediastinum    Chronic venous hypertension (idiopathic) with ulcer of right lower extremity ([facility] [address] ) - Primary    Relevant Orders    CBC and differential    Comprehensive metabolic panel    Lipid panel       Other    Mild protein-calorie malnutrition ([facility] [address] )     Relevant Orders    Lipid panel      Other Visit Diagnoses     Medicare annual wellness visit, subsequent        Screening for diabetes mellitus        Relevant Orders    Comprehensive metabolic panel    Screening, lipid        Relevant Orders    Lipid panel           Preventive health issues were discussed with patient, and age appropriate screening tests were ordered as noted in patient's After Visit Summary  Personalized health advice and appropriate referrals for health education or preventive services given if needed, as noted in patient's After Visit Summary  History of Present Illness:     Patient presents for Welcome to Medicare visit  Patient Care Team:  Anali Esteban DO as PCP - General (Family Medicine)     Review of Systems:     Review of Systems   Constitutional: Negative for unexpected weight change  HENT: Positive for dental problem (going to see a dentist soon)  Negative for congestion, ear pain, rhinorrhea and sore throat  Eyes: Negative for visual disturbance  Respiratory: Negative for cough (a little bit intermittently ) and shortness of breath  Cardiovascular: Negative for chest pain, palpitations and leg swelling  Gastrointestinal: Negative for abdominal pain, constipation and diarrhea  Endocrine: Negative for polyuria  Genitourinary: Negative for dysuria  Skin: Positive for wound (known RLE -- following with Wound Care)  Neurological: Negative for dizziness, light-headedness and headaches  Psychiatric/Behavioral: Negative for sleep disturbance        Problem List:     Patient Active Problem List   Diagnosis    Chronic venous hypertension (idiopathic) with ulcer of right lower extremity ([facility] [address] )    Patient is Baptist    Mild protein-calorie malnutrition ([facility] [address] )       Past Medical and Surgical History:     Past Medical History:   Diagnosis Date    Dementia ([facility] [address] )     Memory loss 2018     Past Surgical History:   Procedure Laterality Date    ANKLE FRACTURE SURGERY        Family History:     Family History   Problem Relation Age of Onset   [facility] Dementia Sister         Suri's sister is     Cancer Father       Social History:     Social History     Socioeconomic History    Marital status:      Spouse name: None    Number of children: None    Years of education: None    Highest education level: None   Occupational History    None   Tobacco Use    Smoking status: Never Smoker    Smokeless tobacco: Never Used   Substance and Sexual Activity    Alcohol use: Yes    Drug use: No    Sexual activity: Not Currently     Partners: Male     Birth control/protection: None   Other Topics Concern    None   Social History Narrative    Lives alone      Social Determinants of Health     Financial Resource Strain:     Difficulty of Paying Living Expenses:    Food Insecurity:     Worried About Running Out of Food in the Last Year:     [address] in the Last Year:    Transportation Needs:     Lack of Transportation (Medical):      Lack of Transportation (Non-Medical):    Physical Activity:     Days of Exercise per Week:     Minutes of Exercise per Session:    Stress:     Feeling of Stress :    Social Connections:     Frequency of Communication with Friends and Family:     Frequency of Social Gatherings with Friends and Family:     Attends Baptism Services:     Active Member of Clubs or Organizations:     Attends Club or Organization Meetings:     Marital Status:    Intimate Partner Violence:     Fear of Current or Ex-Partner:     Emotionally Abused:     Physically Abused:     Sexually Abused: Medications and Allergies:     No current outpatient medications on file  No current facility-administered medications for this visit  No Known Allergies   Immunizations: There is no immunization history on file for this patient  Health Maintenance: There are no preventive care reminders to display for this patient  Topic Date Due    COVID-19 Vaccine (1) Never done    DTaP,Tdap,and Td Vaccines (1 - Tdap) Never done    Pneumococcal Vaccine: 65+ Years (1 of 1 - PPSV23) Never done    Influenza Vaccine (1) 09/01/2021      Medicare Screening Tests and Risk Assessments:     Renuka Dennis is here for her Subsequent Wellness visit  Health Risk Assessment:   Patient rates overall health as very good  Patient feels that their physical health rating is same  Patient is very satisfied with their life  Eyesight was rated as same  Hearing was rated as same  Patient feels that their emotional and mental health rating is much better  Patients states they are never, rarely angry  Patient states they are never, rarely unusually tired/fatigued  Pain experienced in the last 7 days has been some  Patient states that she has experienced no weight loss or gain in last 6 months  Depression Screening:   PHQ-2 Score: 0      Fall Risk Screening: In the past year, patient has experienced: no history of falling in past year      Urinary Incontinence Screening:   Patient has not leaked urine accidently in the last six months  Home Safety:  Patient does not have trouble with stairs inside or outside of their home  Patient has working smoke alarms and has working carbon monoxide detector  Home safety hazards include: none  Nutrition:   Current diet is Regular  Medications:   Patient is currently taking over-the-counter supplements  OTC medications include: see medication list  Patient is able to manage medications       Activities of Daily Living (ADLs)/Instrumental Activities of Daily Living (IADLs):   Walk and transfer into and out of bed and chair?: Yes  Dress and groom yourself?: Yes    Bathe or shower yourself?: Yes    Feed yourself? Yes  Do your laundry/housekeeping?: Yes  Manage your money, pay your bills and track your expenses?: Yes  Make your own meals?: Yes    Do your own shopping?: Yes    Durable Medical Equipment Suppliers  N/A    Previous Hospitalizations:   Any hospitalizations or ED visits within the last 12 months?: Yes    How many hospitalizations have you had in the last year?: 1-2    Advance Care Planning:   Living will: Yes    Advanced directive: Yes      Cognitive Screening:   Provider or family/friend/caregiver concerned regarding cognition?: No    PREVENTIVE SCREENINGS      Cardiovascular Screening:    General: Screening Current    Due for: Lipid Panel      Diabetes Screening:     General: Screening Current    Due for: Blood Glucose      Colorectal Cancer Screening:     General: Screening Not Indicated      Breast Cancer Screening:     General: Screening Not Indicated      Cervical Cancer Screening:    General: Screening Not Indicated      Osteoporosis Screening:    General: Screening Not Indicated      Abdominal Aortic Aneurysm (AAA) Screening:        General: Screening Not Indicated      Lung Cancer Screening:     General: Screening Not Indicated      Hepatitis C Screening:    General: Screening Not Indicated    Screening, Brief Intervention, and Referral to Treatment (SBIRT)    Screening  Typical number of drinks in a day: 0  Typical number of drinks in a week: 0  Interpretation: Low risk drinking behavior      Single Item Drug Screening:  How often have you used an illegal drug (including marijuana) or a prescription medication for non-medical reasons in the past year? never    Single Item Drug Screen Score: 0  Interpretation: Negative screen for possible drug use disorder    No exam data present     Physical Exam:     /64   Pulse 96   Temp 98 3 °F (36 8 °C)   Ht 5' 9" (1 753 m)   Wt 56 7 kg (125 lb)   SpO2 99%   BMI 18 46 kg/m²     Physical Exam  Vitals and nursing note reviewed  Constitutional:       General: She is not in acute distress  Appearance: She is well-developed  HENT:      Head: Normocephalic and atraumatic  Right Ear: Tympanic membrane, ear canal and external ear normal       Left Ear: Tympanic membrane, ear canal and external ear normal       Nose: Nose normal       Mouth/Throat:      Mouth: Mucous membranes are moist    Eyes:      Conjunctiva/sclera: Conjunctivae normal    Neck:      Thyroid: No thyromegaly  Cardiovascular:      Rate and Rhythm: Normal rate and regular rhythm  Pulmonary:      Effort: Pulmonary effort is normal  No respiratory distress  Breath sounds: Normal breath sounds  Abdominal:      General: Bowel sounds are normal  There is no distension  Palpations: Abdomen is soft  Tenderness: There is no abdominal tenderness  Musculoskeletal:         General: Normal range of motion  Lymphadenopathy:      Cervical: No cervical adenopathy  Skin:     General: Skin is warm and dry  Comments: Known RLE wound (not visualized) with some associated erythema stretching up shin -- area approximately 3 cm circular with overlying white/yellow crusting  Encouraged to monitor, f/u with Wound Care, and call if area changes   Neurological:      General: No focal deficit present  Mental Status: She is alert  Psychiatric:         Mood and Affect: Mood normal         BMI Counseling: Body mass index is 18 46 kg/m²  The BMI is below normal  Patient was advised to gain weight  Is trying to take in more protein for wound healing         Anali Esteban DO

## 2021-07-29 ENCOUNTER — APPOINTMENT (OUTPATIENT)
Dept: LAB | Facility: CLINIC | Age: 81
End: 2021-07-29
Payer: MEDICARE

## 2021-07-29 DIAGNOSIS — L97.919 CHRONIC VENOUS HYPERTENSION (IDIOPATHIC) WITH ULCER OF RIGHT LOWER EXTREMITY (HCC): ICD-10-CM

## 2021-07-29 DIAGNOSIS — Z13.220 SCREENING, LIPID: ICD-10-CM

## 2021-07-29 DIAGNOSIS — E44.1 MILD PROTEIN-CALORIE MALNUTRITION (HCC): ICD-10-CM

## 2021-07-29 DIAGNOSIS — I87.311 CHRONIC VENOUS HYPERTENSION (IDIOPATHIC) WITH ULCER OF RIGHT LOWER EXTREMITY (HCC): ICD-10-CM

## 2021-07-29 DIAGNOSIS — Z13.1 SCREENING FOR DIABETES MELLITUS: ICD-10-CM

## 2021-07-29 LAB
ALBUMIN SERPL BCP-MCNC: 3.9 G/DL (ref 3.5–5)
ALP SERPL-CCNC: 88 U/L (ref 46–116)
ALT SERPL W P-5'-P-CCNC: 23 U/L (ref 12–78)
ANION GAP SERPL CALCULATED.3IONS-SCNC: 4 MMOL/L (ref 4–13)
AST SERPL W P-5'-P-CCNC: 21 U/L (ref 5–45)
BASOPHILS # BLD AUTO: 0.09 THOUSANDS/ΜL (ref 0–0.1)
BASOPHILS NFR BLD AUTO: 2 % (ref 0–1)
BILIRUB SERPL-MCNC: 1.09 MG/DL (ref 0.2–1)
BUN SERPL-MCNC: 28 MG/DL (ref 5–25)
CALCIUM SERPL-MCNC: 9.5 MG/DL (ref 8.3–10.1)
CHLORIDE SERPL-SCNC: 108 MMOL/L (ref 100–108)
CHOLEST SERPL-MCNC: 189 MG/DL (ref 50–200)
CO2 SERPL-SCNC: 29 MMOL/L (ref 21–32)
CREAT SERPL-MCNC: 0.66 MG/DL (ref 0.6–1.3)
EOSINOPHIL # BLD AUTO: 0.13 THOUSAND/ΜL (ref 0–0.61)
EOSINOPHIL NFR BLD AUTO: 3 % (ref 0–6)
ERYTHROCYTE [DISTWIDTH] IN BLOOD BY AUTOMATED COUNT: 14.8 % (ref 11.6–15.1)
GFR SERPL CREATININE-BSD FRML MDRD: 82 ML/MIN/1.73SQ M
GLUCOSE P FAST SERPL-MCNC: 82 MG/DL (ref 65–99)
HCT VFR BLD AUTO: 38.5 % (ref 34.8–46.1)
HDLC SERPL-MCNC: 106 MG/DL
HGB BLD-MCNC: 12.5 G/DL (ref 11.5–15.4)
IMM GRANULOCYTES # BLD AUTO: 0.02 THOUSAND/UL (ref 0–0.2)
IMM GRANULOCYTES NFR BLD AUTO: 0 % (ref 0–2)
LDLC SERPL CALC-MCNC: 76 MG/DL (ref 0–100)
LYMPHOCYTES # BLD AUTO: 1.4 THOUSANDS/ΜL (ref 0.6–4.47)
LYMPHOCYTES NFR BLD AUTO: 26 % (ref 14–44)
MCH RBC QN AUTO: 33.6 PG (ref 26.8–34.3)
MCHC RBC AUTO-ENTMCNC: 32.5 G/DL (ref 31.4–37.4)
MCV RBC AUTO: 104 FL (ref 82–98)
MONOCYTES # BLD AUTO: 0.62 THOUSAND/ΜL (ref 0.17–1.22)
MONOCYTES NFR BLD AUTO: 12 % (ref 4–12)
NEUTROPHILS # BLD AUTO: 3.04 THOUSANDS/ΜL (ref 1.85–7.62)
NEUTS SEG NFR BLD AUTO: 57 % (ref 43–75)
NONHDLC SERPL-MCNC: 83 MG/DL
NRBC BLD AUTO-RTO: 0 /100 WBCS
PLATELET # BLD AUTO: 155 THOUSANDS/UL (ref 149–390)
PMV BLD AUTO: 11 FL (ref 8.9–12.7)
POTASSIUM SERPL-SCNC: 4 MMOL/L (ref 3.5–5.3)
PROT SERPL-MCNC: 7.2 G/DL (ref 6.4–8.2)
RBC # BLD AUTO: 3.72 MILLION/UL (ref 3.81–5.12)
SODIUM SERPL-SCNC: 141 MMOL/L (ref 136–145)
TRIGL SERPL-MCNC: 34 MG/DL
WBC # BLD AUTO: 5.3 THOUSAND/UL (ref 4.31–10.16)

## 2021-07-29 PROCEDURE — 36415 COLL VENOUS BLD VENIPUNCTURE: CPT

## 2021-07-29 PROCEDURE — 80061 LIPID PANEL: CPT

## 2021-07-29 PROCEDURE — 80053 COMPREHEN METABOLIC PANEL: CPT

## 2021-07-29 PROCEDURE — 85025 COMPLETE CBC W/AUTO DIFF WBC: CPT

## 2021-08-03 ENCOUNTER — OFFICE VISIT (OUTPATIENT)
Dept: WOUND CARE | Facility: CLINIC | Age: 81
End: 2021-08-03
Payer: MEDICARE

## 2021-08-03 VITALS
TEMPERATURE: 96.4 F | DIASTOLIC BLOOD PRESSURE: 54 MMHG | SYSTOLIC BLOOD PRESSURE: 112 MMHG | HEART RATE: 69 BPM | RESPIRATION RATE: 18 BRPM

## 2021-08-03 DIAGNOSIS — L97.919 CHRONIC VENOUS HYPERTENSION (IDIOPATHIC) WITH ULCER OF RIGHT LOWER EXTREMITY (HCC): Primary | ICD-10-CM

## 2021-08-03 DIAGNOSIS — I87.311 CHRONIC VENOUS HYPERTENSION (IDIOPATHIC) WITH ULCER OF RIGHT LOWER EXTREMITY (HCC): Primary | ICD-10-CM

## 2021-08-03 PROCEDURE — 11042 DBRDMT SUBQ TIS 1ST 20SQCM/<: CPT | Performed by: FAMILY MEDICINE

## 2021-08-03 RX ORDER — LIDOCAINE HYDROCHLORIDE 40 MG/ML
5 SOLUTION TOPICAL ONCE
Status: COMPLETED | OUTPATIENT
Start: 2021-08-03 | End: 2021-08-03

## 2021-08-03 RX ADMIN — LIDOCAINE HYDROCHLORIDE 5 ML: 40 SOLUTION TOPICAL at 08:12

## 2021-08-03 NOTE — ASSESSMENT & PLAN NOTE
Wound is somewhat improved  Debrided as below   Magdiin's rinse done today but patient was unable to tolerate it    Will send home with pro phase  Continue wound management with silver alginate as below   Continue compression with Tubigrip   keep leg elevated as much as possible  Followup in 1 week or call sooner with questions or concerns

## 2021-08-03 NOTE — PATIENT INSTRUCTIONS
Orders Placed This Encounter   Procedures    Wound cleansing and dressings     Right leg:     Wash your hands with soap and water  Remove old dressing, discard into plastic bag and place in trash  Cleanse the wound with dakins solition prior to applying a clean dressing  Do not use tissue or cotton balls  Do not scrub the wound  Pat dry using gauze      May shower every other day (on dressing change days)     Apply silver alginate to the wound, (if dressing is sticking then can use adaptic to the wound before the silver alginate)  Cover with gauze, rolled gauze and secure with tape  Spandagrip F compression on at all times except when dressing is getting changed       Change dressing every other day or more often as needed    Moisturize around the wound     Wound debrided today  Wound must be covered at all times      Please try to have 3-4 servings of protein per day  Chicken, fish, nuts, yogurt etc and continue your protein shakes       Until your wound is healed you should keep your leg up as much as possible  While you are seated please keep your feet up  You can still walk      This was done today at the wound care center  See back in 1 week                                  Standing Status:   Future     Standing Expiration Date:   8/3/2022    Wound compression and edema control     Elastic Tubular Stocking  Spandagrip F     Tubular elastic bandage: Apply from base of toes to behind the knee  Apply in AM, may remove for sleep      Avoid prolonged standing in one place      Elevate leg(s) above the level of the heart when sitting or as much as possible       Standing Status:   Future     Standing Expiration Date:   8/3/2022

## 2021-08-03 NOTE — PROGRESS NOTES
Patient ID: Ennis Gottron is a 78 y o  female Date of Birth 1940     Chief Complaint  Chief Complaint   Patient presents with    Follow Up Wound Care Visit     RLE mwound       Allergies  Patient has no known allergies  Assessment:    Chronic venous hypertension (idiopathic) with ulcer of right lower extremity (HCC)   Wound is somewhat improved  Debrided as below   Dakin's rinse done today but patient was unable to tolerate it  Will send home with pro phase  Continue wound management with silver alginate as below   Continue compression with Tubigrip   keep leg elevated as much as possible  Followup in 1 week or call sooner with questions or concerns       Diagnoses and all orders for this visit:    Chronic venous hypertension (idiopathic) with ulcer of right lower extremity (HCC)  -     lidocaine (XYLOCAINE) 4 % topical solution 5 mL  -     Wound cleansing and dressings; Future  -     Wound compression and edema control; Future  -     Debridement              Debridement   Wound 07/13/21 Venous Ulcer Pretibial Distal;Right    Universal Protocol:  Consent: Verbal consent obtained  Consent given by: patient  Time out: Immediately prior to procedure a "time out" was called to verify the correct patient, procedure, equipment, support staff and site/side marked as required    Timeout called at: 8/3/2021 8:20 AM   Patient understanding: patient states understanding of the procedure being performed  Patient identity confirmed: verbally with patient      Performed by: physician  Debridement type: surgical  Level of debridement: subcutaneous tissue  Pain control: lidocaine 4%  Post-debridement measurements  Length (cm): 2  Width (cm): 2  Depth (cm): 0 2  Percent debrided: 100%  Surface Area (cm^2): 4  Area debrided (cm^2): 4  Volume (cm^3): 0 8  Tissue and other material debrided: subcutaneous tissue  Devitalized tissue debrided: exudate and slough  Instrument(s) utilized: curette  Bleeding: small  Hemostasis obtained with: pressure  Procedural pain (0-10): 0  Post-procedural pain: 0   Response to treatment: procedure was tolerated well          Plan:     Wound 07/13/21 Venous Ulcer Pretibial Distal;Right (Active)   Wound Image Images linked 08/03/21 0829   Wound Description Pink;Yellow;Slough 08/03/21 0809   Vandana-wound Assessment Hyperpigmented;[place] 08/03/21 0809   Wound Length (cm) 2 cm 08/03/21 0809   Wound Width (cm) 2 cm 08/03/21 0809   Wound Depth (cm) 0 1 cm 08/03/21 0809   Wound Surface Area (cm^2) 4 cm^2 08/03/21 0809   Wound Volume (cm^3) 0 4 cm^3 08/03/21 0809   Calculated Wound Volume (cm^3) 0 4 cm^3 08/03/21 0809   Change in Wound Size % 16 67 08/03/21 0809   Drainage Amount Moderate 08/03/21 0809   Drainage Description Serosanguineous 08/03/21 0809   Non-staged Wound Description Full thickness 08/03/21 0809   Dressing Status Intact; Other (Comment) (stuck) 08/03/21 0809       Wound 07/13/21 Venous Ulcer Pretibial Distal;Right (Active)   Date First Assessed/Time First Assessed: 07/13/21 1410   Primary Wound Type: Venous Ulcer  Location: Pretibial  Wound Location Orientation: Distal;Right       Subjective:        Patient presents for followup of a right lower extremity venous ulcer  No increased pain,   Niece reports occasional increased drainage  Has been using Silver alginate on wound and Tubigrip for compression  The following portions of the patient's history were reviewed and updated as appropriate: She  has a past medical history of Dementia ([address] ) and Memory loss (2018)  She   Patient Active Problem List    Diagnosis Date Noted    Patient is Christianity 07/28/2021    Mild protein-calorie malnutrition ([address] )  07/28/2021    Chronic venous hypertension (idiopathic) with ulcer of right lower extremity ([address] ) 07/13/2021     She  reports that she has never smoked  She has never used smokeless tobacco  She reports current alcohol use  She reports that she does not use drugs    No current outpatient medications on file  No current facility-administered medications for this visit  She has No Known Allergies       Review of Systems   Constitutional: Negative for chills and fever  HENT: Negative for congestion and sneezing  Respiratory: Negative for cough  Cardiovascular: Positive for leg swelling  Skin: Positive for wound  Psychiatric/Behavioral: Negative for agitation  Objective:       Wound 07/13/21 Venous Ulcer Pretibial Distal;Right (Active)   Wound Image Images linked 08/03/21 0829   Wound Description Pink;Yellow;Slough 08/03/21 0809   Vandana-wound Assessment Hyperpigmented;[place] 08/03/21 0809   Wound Length (cm) 2 cm 08/03/21 0809   Wound Width (cm) 2 cm 08/03/21 0809   Wound Depth (cm) 0 1 cm 08/03/21 0809   Wound Surface Area (cm^2) 4 cm^2 08/03/21 0809   Wound Volume (cm^3) 0 4 cm^3 08/03/21 0809   Calculated Wound Volume (cm^3) 0 4 cm^3 08/03/21 0809   Change in Wound Size % 16 67 08/03/21 0809   Drainage Amount Moderate 08/03/21 0809   Drainage Description Serosanguineous 08/03/21 0809   Non-staged Wound Description Full thickness 08/03/21 0809   Dressing Status Intact; Other (Comment) (stuck) 08/03/21 0809       /54   Pulse 69   Temp (!) 96 4 °F (35 8 °C)   Resp 18     Physical Exam        Wound 07/13/21 Venous Ulcer Pretibial Distal;Right (Active)   Wound Image   08/03/21 0829   Wound Description Pink;Yellow;Slough 08/03/21 0809   Vandana-wound Assessment Hyperpigmented;[place] 08/03/21 0809   Wound Length (cm) 2 cm 08/03/21 0809   Wound Width (cm) 2 cm 08/03/21 0809   Wound Depth (cm) 0 1 cm 08/03/21 0809   Wound Surface Area (cm^2) 4 cm^2 08/03/21 0809   Wound Volume (cm^3) 0 4 cm^3 08/03/21 0809   Calculated Wound Volume (cm^3) 0 4 cm^3 08/03/21 0809   Change in Wound Size % 16 67 08/03/21 0809   Drainage Amount Moderate 08/03/21 0809   Drainage Description Serosanguineous 08/03/21 0809   Non-staged Wound Description Full thickness 08/03/21 0809   Dressing Status Intact; Other (Comment) 08/03/21 0809                       Wound Instructions:  Orders Placed This Encounter   Procedures    Wound cleansing and dressings     Right leg:     Wash your hands with soap and water  Remove old dressing, discard into plastic bag and place in trash  Cleanse the wound with dakins solition prior to applying a clean dressing  Do not use tissue or cotton balls  Do not scrub the wound  Pat dry using gauze      May shower every other day (on dressing change days)     Apply silver alginate to the wound, (if dressing is sticking then can use adaptic to the wound before the silver alginate)  Cover with gauze, rolled gauze and secure with tape  Spandagrip F compression on at all times except when dressing is getting changed       Change dressing every other day or more often as needed    Moisturize around the wound     Wound debrided today  Wound must be covered at all times      Please try to have 3-4 servings of protein per day  Chicken, fish, nuts, yogurt etc and continue your protein shakes       Until your wound is healed you should keep your leg up as much as possible  While you are seated please keep your feet up  You can still walk      This was done today at the wound care center  See back in 1 week                                  Standing Status:   Future     Standing Expiration Date:   8/3/2022    Wound compression and edema control     Elastic Tubular Stocking  Spandagrip F     Tubular elastic bandage: Apply from base of toes to behind the knee  Apply in AM, may remove for sleep      Avoid prolonged standing in one place      Elevate leg(s) above the level of the heart when sitting or as much as possible  Standing Status:   Future     Standing Expiration Date:   8/3/2022    Debridement     This order was created via procedure documentation        Diagnosis ICD-10-CM Associated Orders   1   Chronic venous [facility] (idiopathic) with [facility] lower [facility]  I20 246 lidocaine (XYLOCAINE) 4 % topical solution 5 mL    L97 910 Wound cleansing and dressings     Wound compression and edema control     Debridement

## 2021-08-10 ENCOUNTER — OFFICE VISIT (OUTPATIENT)
Dept: WOUND CARE | Facility: CLINIC | Age: 81
End: 2021-08-10
Payer: MEDICARE

## 2021-08-10 VITALS
TEMPERATURE: 96.7 F | RESPIRATION RATE: 18 BRPM | SYSTOLIC BLOOD PRESSURE: 99 MMHG | DIASTOLIC BLOOD PRESSURE: 55 MMHG | HEART RATE: 71 BPM

## 2021-08-10 DIAGNOSIS — I87.311 CHRONIC VENOUS HYPERTENSION (IDIOPATHIC) WITH ULCER OF RIGHT LOWER EXTREMITY (HCC): Primary | ICD-10-CM

## 2021-08-10 DIAGNOSIS — L97.919 CHRONIC VENOUS HYPERTENSION (IDIOPATHIC) WITH ULCER OF RIGHT LOWER EXTREMITY (HCC): Primary | ICD-10-CM

## 2021-08-10 PROCEDURE — 11042 DBRDMT SUBQ TIS 1ST 20SQCM/<: CPT | Performed by: FAMILY MEDICINE

## 2021-08-10 RX ORDER — LIDOCAINE HYDROCHLORIDE 40 MG/ML
5 SOLUTION TOPICAL ONCE
Status: COMPLETED | OUTPATIENT
Start: 2021-08-10 | End: 2021-08-10

## 2021-08-10 RX ADMIN — LIDOCAINE HYDROCHLORIDE 5 ML: 40 SOLUTION TOPICAL at 11:41

## 2021-08-10 NOTE — PATIENT INSTRUCTIONS
Orders Placed This Encounter   Procedures    Wound cleansing and dressings     Wound cleansing and dressings       Right leg:     Wash your hands with soap and water  Remove old dressing, discard into plastic bag and place in trash  Cleanse the wound with dakins solition prior to applying a clean dressing  Do not use tissue or cotton balls  Do not scrub the wound  Pat dry using gauze      May shower every other day (on dressing change days)     Apply silver alginate to the wound, (if dressing is sticking then can use adaptic to the wound before the silver alginate)  Cover with gauze, rolled gauze and secure with tape  Spandagrip F compression on at all times except when dressing is getting changed       Change dressing every other day or more often as needed     Moisturize around the wound         Wound debrided today  Wound must be covered at all times      Please try to have 3-4 servings of protein per day  Chicken, fish, nuts, yogurt etc and continue your protein shakes       Until your wound is healed you should keep your leg up as much as possible, please try to increase the amount of time that legs are elevated  While you are seated please keep your feet up heart level or above       This was done today at the wound care center      See back in 1 week                   Standing Status:   Future     Standing Expiration Date:   8/10/2022

## 2021-08-10 NOTE — PROGRESS NOTES
Patient ID: Sary Mazariegos is a 78 y o  female Date of Birth 1940     Chief Complaint  Chief Complaint   Patient presents with    Follow Up Wound Care Visit     right lower leg ulcer       Allergies  Patient has no known allergies  Assessment:    Chronic venous hypertension (idiopathic) with ulcer of right lower extremity (HCC)    Wound is essentially unchanged   Debrided as below   Discussed the importance of edema reduction via compression and elevation  Patient is on her feet too much  Consider increasing compression next week when a nurse visit will be possible on Friday  Keep legs elevated as much as possible  Continue wound management with silver alginate  Followup in 1 week or call sooner with questions or concerns       Diagnoses and all orders for this visit:    Chronic venous hypertension (idiopathic) with ulcer of right lower extremity (HCC)  -     lidocaine (XYLOCAINE) 4 % topical solution 5 mL  -     Wound cleansing and dressings; Future  -     Debridement              Debridement   Wound 07/13/21 Venous Ulcer Pretibial Distal;Right    Universal Protocol:  Consent: Verbal consent obtained  Consent given by: patient  Time out: Immediately prior to procedure a "time out" was called to verify the correct patient, procedure, equipment, support staff and site/side marked as required    Timeout called at: 8/10/2021 11:50 AM   Patient understanding: patient states understanding of the procedure being performed  Patient identity confirmed: verbally with patient      Performed by: physician  Debridement type: surgical  Level of debridement: subcutaneous tissue  Pain control: lidocaine 4%  Post-debridement measurements  Length (cm): 2 2  Width (cm): 1 9  Depth (cm): 0 2  Percent debrided: 100%  Surface Area (cm^2): 4 18  Area debrided (cm^2): 4 18  Volume (cm^3): 0 84  Tissue and other material debrided: subcutaneous tissue  Devitalized tissue debrided: exudate and slough  Instrument(s) utilized: curette  Bleeding: small  Hemostasis obtained with: pressure  Procedural pain (0-10): 0  Post-procedural pain: 0   Response to treatment: procedure was tolerated well          Plan:     Wound 07/13/21 Venous Ulcer Pretibial Distal;Right (Active)   Wound Image Images linked 08/10/21 1212   Wound Description Pink;Yellow;Slough 08/10/21 1133   Vandana-wound Assessment Hyperpigmented;[place] 08/10/21 1133   Wound Length (cm) 2 2 cm 08/10/21 1133   Wound Width (cm) 1 9 cm 08/10/21 1133   Wound Depth (cm) 0 1 cm 08/10/21 1133   Wound Surface Area (cm^2) 4 18 cm^2 08/10/21 1133   Wound Volume (cm^3) 0 418 cm^3 08/10/21 1133   Calculated Wound Volume (cm^3) 0 42 cm^3 08/10/21 1133   Change in Wound Size % 12 5 08/10/21 1133   Drainage Amount Moderate 08/10/21 1133   Drainage Description Serosanguineous 08/10/21 1133   Non-staged Wound Description Full thickness 08/10/21 1133   Dressing Status Intact 08/10/21 1133       Wound 07/13/21 Venous Ulcer Pretibial Distal;Right (Active)   Date First Assessed/Time First Assessed: 07/13/21 1410   Primary Wound Type: Venous Ulcer  Location: Pretibial  Wound Location Orientation: Distal;Right       Subjective:        Patient presents for followup of right lower extremity venous ulcer  No increased pain or drainage  Has been using Silver alginate on the wound and Tubigrip for compression  Patient's niece reports that they have been changing the dressing daily  Patient's niece reports that patient is doing a lot of walking, she walks [place] to friend's house  She also was doing laps around the car today  The following portions of the patient's history were reviewed and updated as appropriate: She  has a past medical history of Dementia ([address] ) and Memory loss (2018)    She   Patient Active Problem List    Diagnosis Date Noted    Patient is Worship 07/28/2021    Mild protein-calorie malnutrition ([address] )  07/28/2021    Chronic venous hypertension (idiopathic) with ulcer of right lower extremity ([address] ) 07/13/2021     She  reports that she has never smoked  She has never used smokeless tobacco  She reports current alcohol use  She reports that she does not use drugs  She has No Known Allergies       Review of Systems   Constitutional: Negative for chills and fever  HENT: Negative for congestion and sneezing  Respiratory: Negative for cough  Cardiovascular: Positive for leg swelling  Skin: Positive for wound  Psychiatric/Behavioral: Negative for agitation           Objective:       Wound 07/13/21 Venous Ulcer Pretibial Distal;Right (Active)   Wound Image Images linked 08/10/21 1212   Wound Description Pink;Yellow;Slough 08/10/21 1133   Vandana-wound Assessment Hyperpigmented;[place] 08/10/21 1133   Wound Length (cm) 2 2 cm 08/10/21 1133   Wound Width (cm) 1 9 cm 08/10/21 1133   Wound Depth (cm) 0 1 cm 08/10/21 1133   Wound Surface Area (cm^2) 4 18 cm^2 08/10/21 1133   Wound Volume (cm^3) 0 418 cm^3 08/10/21 1133   Calculated Wound Volume (cm^3) 0 42 cm^3 08/10/21 1133   Change in Wound Size % 12 5 08/10/21 1133   Drainage Amount Moderate 08/10/21 1133   Drainage Description Serosanguineous 08/10/21 1133   Non-staged Wound Description Full thickness 08/10/21 1133   Dressing Status Intact 08/10/21 1133       BP 99/55   Pulse 71   Temp (!) 96 7 °F (35 9 °C)   Resp 18     Physical Exam        Wound 07/13/21 Venous Ulcer Pretibial Distal;Right (Active)   Wound Image   08/10/21 1212   Wound Description Pink;Yellow;Slough 08/10/21 1133   Vandana-wound Assessment Hyperpigmented;[place] 08/10/21 1133   Wound Length (cm) 2 2 cm 08/10/21 1133   Wound Width (cm) 1 9 cm 08/10/21 1133   Wound Depth (cm) 0 1 cm 08/10/21 1133   Wound Surface Area (cm^2) 4 18 cm^2 08/10/21 1133   Wound Volume (cm^3) 0 418 cm^3 08/10/21 1133   Calculated Wound Volume (cm^3) 0 42 cm^3 08/10/21 1133   Change in Wound Size % 12 5 08/10/21 1133   Drainage Amount Moderate 08/10/21 1133   Drainage Description Serosanguineous 08/10/21 1133   Non-staged Wound Description Full thickness 08/10/21 1133   Dressing Status Intact 08/10/21 1133                       Wound Instructions:  Orders Placed This Encounter   Procedures    Wound cleansing and dressings     Wound cleansing and dressings       Right leg:     Wash your hands with soap and water  Remove old dressing, discard into plastic bag and place in trash  Cleanse the wound with dakins solition prior to applying a clean dressing  Do not use tissue or cotton balls  Do not scrub the wound  Pat dry using gauze      May shower every other day (on dressing change days)     Apply silver alginate to the wound, (if dressing is sticking then can use adaptic to the wound before the silver alginate)  Cover with gauze, rolled gauze and secure with tape  Spandagrip F compression on at all times except when dressing is getting changed       Change dressing every other day or more often as needed     Moisturize around the wound         Wound debrided today  Wound must be covered at all times      Please try to have 3-4 servings of protein per day  Chicken, fish, nuts, yogurt etc and continue your protein shakes       Until your wound is healed you should keep your leg up as much as possible, please try to increase the amount of time that legs are elevated  While you are seated please keep your feet up heart level or above       This was done today at the wound care center  See back in 1 week                   Standing Status:   Future     Standing Expiration Date:   8/10/2022    Debridement     This order was created via procedure documentation        Diagnosis ICD-10-CM Associated Orders   1   Chronic venous hypertension (idiopathic) with ulcer of right lower extremity (HCC)  I87 311 lidocaine (XYLOCAINE) 4 % topical solution 5 mL    L97 919 Wound cleansing and dressings     Debridement

## 2021-08-10 NOTE — ASSESSMENT & PLAN NOTE
Wound is essentially unchanged   Debrided as below   Discussed the importance of edema reduction via compression and elevation    Patient is on her feet too much  Consider increasing compression next week when a nurse visit will be possible on Friday  Keep legs elevated as much as possible  Continue wound management with silver alginate  Followup in 1 week or call sooner with questions or concerns

## 2021-08-17 ENCOUNTER — OFFICE VISIT (OUTPATIENT)
Dept: WOUND CARE | Facility: CLINIC | Age: 81
End: 2021-08-17
Payer: MEDICARE

## 2021-08-17 VITALS
DIASTOLIC BLOOD PRESSURE: 58 MMHG | TEMPERATURE: 97.3 F | SYSTOLIC BLOOD PRESSURE: 120 MMHG | RESPIRATION RATE: 18 BRPM | HEART RATE: 75 BPM

## 2021-08-17 DIAGNOSIS — I87.311 CHRONIC VENOUS HYPERTENSION (IDIOPATHIC) WITH ULCER OF RIGHT LOWER EXTREMITY (HCC): Primary | ICD-10-CM

## 2021-08-17 DIAGNOSIS — L97.919 CHRONIC VENOUS HYPERTENSION (IDIOPATHIC) WITH ULCER OF RIGHT LOWER EXTREMITY (HCC): Primary | ICD-10-CM

## 2021-08-17 PROCEDURE — 11042 DBRDMT SUBQ TIS 1ST 20SQCM/<: CPT | Performed by: FAMILY MEDICINE

## 2021-08-17 RX ORDER — LIDOCAINE HYDROCHLORIDE 40 MG/ML
5 SOLUTION TOPICAL ONCE
Status: COMPLETED | OUTPATIENT
Start: 2021-08-17 | End: 2021-08-17

## 2021-08-17 RX ADMIN — LIDOCAINE HYDROCHLORIDE 5 ML: 40 SOLUTION TOPICAL at 13:35

## 2021-08-17 NOTE — ASSESSMENT & PLAN NOTE
Wound is slightly improved  Debrided as below  Continue wound management silver alginate  Increase compression to coflex light   Keep leg elevated as much as possible   Followup in 1 week and nurse visit on Friday, or call sooner with questions or concerns

## 2021-08-17 NOTE — PATIENT INSTRUCTIONS
Orders Placed This Encounter   Procedures    Wound cleansing and dressings     Right leg:     Wash your hands with soap and water  Remove old dressing, discard into plastic bag and place in trash  Cleanse the wound with dakins solition prior to applying a clean dressing  Do not use tissue or cotton balls  Do not scrub the wound  Pat dry using gauze      May shower but do not get dressing wet      Apply silver alginate to the wound, cover with optilock and secure with coflex lite     Wound debrided today     Please try to have 3-4 servings of protein per day  Chicken, fish, nuts, yogurt etc and continue your protein shakes       Please try to increase the amount of time that legs are elevated  While you are seated please keep your feet up heart level or above  See back Friday 8/20 for nurse visit wrap change         Standing Status:   Future     Standing Expiration Date:   8/17/2022    Wound compression and edema control     Unna Boot/ Multi-layer compression wrap Instructions    Keep compression wrap/wraps clean and dry  If wraps are too tight and you experience numbness/tingling, call the wound center  If after hours, remove wraps or proceed to nearest E R  and call wound center in AM     Mariama Crumbly will be changed Friday at the wound care center    Avoid prolonged standing in one place  Elevate leg(s) above the level of the heart when sitting or as much as possible  Standing Status:   Future     Standing Expiration Date:   8/17/2022     Orders Placed This Encounter   Procedures    Wound cleansing and dressings     Right leg:     Wash your hands with soap and water  Remove old dressing, discard into plastic bag and place in trash  Cleanse the wound with dakins solition prior to applying a clean dressing  Do not use tissue or cotton balls  Do not scrub the wound   Pat dry using gauze      May shower but do not get dressing wet      Apply silver alginate to the wound, cover with optilock and secure with coflex lite     Wound debrided today     Please try to have 3-4 servings of protein per day  Chicken, fish, nuts, yogurt etc and continue your protein shakes       Please try to increase the amount of time that legs are elevated  While you are seated please keep your feet up heart level or above  See back Friday 8/20 for nurse visit wrap change         Standing Status:   Future     Standing Expiration Date:   8/17/2022    Wound compression and edema control     Unna Boot/ Multi-layer compression wrap Instructions    Keep compression wrap/wraps clean and dry  If wraps are too tight and you experience numbness/tingling, call the wound center  If after hours, remove wraps or proceed to nearest E R  and call wound center in AM     Jules Soria will be changed Friday at the wound care center    Avoid prolonged standing in one place  Elevate leg(s) above the level of the heart when sitting or as much as possible       Standing Status:   Future     Standing Expiration Date:   8/17/2022

## 2021-08-17 NOTE — PROGRESS NOTES
Patient ID: Trisha Arenas is a 78 y o  female Date of Birth 1940     Chief Complaint  Chief Complaint   Patient presents with    Follow Up Wound Care Visit     RLE wound       Allergies  Patient has no known allergies  Assessment:    Chronic venous hypertension (idiopathic) with ulcer of right lower extremity (HCC)    Wound is slightly improved  Debrided as below  Continue wound management silver alginate  Increase compression to coflex light   Keep leg elevated as much as possible   Followup in 1 week and nurse visit on Friday, or call sooner with questions or concerns       Diagnoses and all orders for this visit:    Chronic venous hypertension (idiopathic) with ulcer of right lower extremity (HCC)  -     lidocaine (XYLOCAINE) 4 % topical solution 5 mL  -     Wound cleansing and dressings; Future  -     Wound compression and edema control; Future  -     Debridement              Debridement   Wound 07/13/21 Venous Ulcer Pretibial Distal;Right    Universal Protocol:  Consent: Verbal consent obtained  Consent given by: patient  Time out: Immediately prior to procedure a "time out" was called to verify the correct patient, procedure, equipment, support staff and site/side marked as required    Timeout called at: 8/17/2021 1:35 PM   Patient understanding: patient states understanding of the procedure being performed  Patient identity confirmed: verbally with patient      Performed by: physician  Debridement type: surgical  Level of debridement: subcutaneous tissue  Pain control: lidocaine 4%  Post-debridement measurements  Length (cm): 2 3  Width (cm): 1 5  Depth (cm): 0 2  Percent debrided: 100%  Surface Area (cm^2): 3 45  Area debrided (cm^2): 3 45  Volume (cm^3): 0 69  Tissue and other material debrided: subcutaneous tissue  Devitalized tissue debrided: exudate and slough  Instrument(s) utilized: curette  Bleeding: small  Hemostasis obtained with: pressure  Procedural pain (0-10): 0  Post-procedural pain: 0   Response to treatment: procedure was tolerated well          Plan:     Wound 07/13/21 Venous Ulcer Pretibial Distal;Right (Active)   Wound Image Images linked 08/17/21 1346   Wound Description Pink;Yellow;Slough 08/17/21 1329   Vandana-wound Assessment Hyperpigmented;[place] 08/17/21 1329   Wound Length (cm) 2 3 cm 08/17/21 1329   Wound Width (cm) 1 5 cm 08/17/21 1329   Wound Depth (cm) 0 1 cm 08/17/21 1329   Wound Surface Area (cm^2) 3 45 cm^2 08/17/21 1329   Wound Volume (cm^3) 0 345 cm^3 08/17/21 1329   Calculated Wound Volume (cm^3) 0 34 cm^3 08/17/21 1329   Change in Wound Size % 29 17 08/17/21 1329   Drainage Amount Moderate 08/17/21 1329   Drainage Description Serosanguineous 08/17/21 1329   Non-staged Wound Description Full thickness 08/17/21 1329   Dressing Status Intact 08/17/21 1329       Wound 07/13/21 Venous Ulcer Pretibial Distal;Right (Active)   Date First Assessed/Time First Assessed: 07/13/21 1410   Primary Wound Type: Venous Ulcer  Location: Pretibial  Wound Location Orientation: Distal;Right       Subjective:        Patient presents for followup of right lower extremity venous ulcer  No increased pain or drainage  Has been using Silver alginate wound and Tubigrip for compression  The following portions of the patient's history were reviewed and updated as appropriate: She  has a past medical history of Dementia ([address] ) and Memory loss (2018)  She   Patient Active Problem List    Diagnosis Date Noted    Patient is [employer] 07/28/2021    Mild protein-calorie malnutrition ([address] )  07/28/2021    Chronic venous hypertension (idiopathic) with ulcer of right lower extremity ([address] ) 07/13/2021     She  reports that she has never smoked  She has never used smokeless tobacco  She reports current alcohol use  She reports that she does not use drugs  She has No Known Allergies       Review of Systems   Constitutional: Negative for chills and fever  HENT: Negative for congestion and sneezing  Respiratory: Negative for cough  Cardiovascular: Positive for leg swelling  Skin: Positive for wound  Psychiatric/Behavioral: Negative for agitation  Objective:       Wound 07/13/21 Venous Ulcer Pretibial Distal;Right (Active)   Wound Image Images linked 08/17/21 1346   Wound Description Pink;Yellow;Slough 08/17/21 1329   Vandana-wound Assessment Hyperpigmented;[place] 08/17/21 1329   Wound Length (cm) 2 3 cm 08/17/21 1329   Wound Width (cm) 1 5 cm 08/17/21 1329   Wound Depth (cm) 0 1 cm 08/17/21 1329   Wound Surface Area (cm^2) 3 45 cm^2 08/17/21 1329   Wound Volume (cm^3) 0 345 cm^3 08/17/21 1329   Calculated Wound Volume (cm^3) 0 34 cm^3 08/17/21 1329   Change in Wound Size % 29 17 08/17/21 1329   Drainage Amount Moderate 08/17/21 1329   Drainage Description Serosanguineous 08/17/21 1329   Non-staged Wound Description Full thickness 08/17/21 1329   Dressing Status Intact 08/17/21 1329       /58   Pulse 75   Temp (!) 97 3 °F (36 3 °C)   Resp 18     Physical Exam        Wound 07/13/21 Venous Ulcer Pretibial Distal;Right (Active)   Wound Image   08/17/21 1346   Wound Description Pink;Yellow;Slough 08/17/21 1329   Vandana-wound Assessment Hyperpigmented;[place] 08/17/21 1329   Wound Length (cm) 2 3 cm 08/17/21 1329   Wound Width (cm) 1 5 cm 08/17/21 1329   Wound Depth (cm) 0 1 cm 08/17/21 1329   Wound Surface Area (cm^2) 3 45 cm^2 08/17/21 1329   Wound Volume (cm^3) 0 345 cm^3 08/17/21 1329   Calculated Wound Volume (cm^3) 0 34 cm^3 08/17/21 1329   Change in Wound Size % 29 17 08/17/21 1329   Drainage Amount Moderate 08/17/21 1329   Drainage Description Serosanguineous 08/17/21 1329   Non-staged Wound Description Full thickness 08/17/21 1329   Dressing Status Intact 08/17/21 1329                       Wound Instructions:  Orders Placed This Encounter   Procedures    Wound cleansing and dressings     Right leg:     Wash your hands with soap and water    Remove old dressing, discard into plastic bag and place in trash  Cleanse the wound with dakins solition prior to applying a clean dressing  Do not use tissue or cotton balls  Do not scrub the wound  Pat dry using gauze      May shower but do not get dressing wet      Apply silver alginate to the wound, cover with optilock and secure with coflex lite     Wound debrided today     Please try to have 3-4 servings of protein per day  Chicken, fish, nuts, yogurt etc and continue your protein shakes       Please try to increase the amount of time that legs are elevated  While you are seated please keep your feet up heart level or above  See back Friday 8/20 for nurse visit wrap change         Standing Status:   Future     Standing Expiration Date:   8/17/2022    Wound compression and edema control     Unna Boot/ Multi-layer compression wrap Instructions    Keep compression wrap/wraps clean and dry  If wraps are too tight and you experience numbness/tingling, call the wound center  If after hours, remove wraps or proceed to nearest E R  and call wound center in AM     Asenath Riis will be changed Friday at the wound care center    Avoid prolonged standing in one place  Elevate leg(s) above the level of the heart when sitting or as much as possible  Standing Status:   Future     Standing Expiration Date:   8/17/2022    Debridement     This order was created via procedure documentation        Diagnosis ICD-10-CM Associated Orders   1   Chronic venous hypertension (idiopathic) with ulcer of right lower extremity (HCC)  I87 311 lidocaine (XYLOCAINE) 4 % topical solution 5 mL    L97 919 Wound cleansing and dressings     Wound compression and edema control     Debridement

## 2021-08-20 ENCOUNTER — CLINICAL SUPPORT (OUTPATIENT)
Dept: WOUND CARE | Facility: CLINIC | Age: 81
End: 2021-08-20
Payer: MEDICARE

## 2021-08-20 VITALS
RESPIRATION RATE: 18 BRPM | DIASTOLIC BLOOD PRESSURE: 58 MMHG | HEART RATE: 75 BPM | SYSTOLIC BLOOD PRESSURE: 134 MMHG | TEMPERATURE: 97.9 F

## 2021-08-20 DIAGNOSIS — L97.919 CHRONIC VENOUS HYPERTENSION (IDIOPATHIC) WITH ULCER OF RIGHT LOWER EXTREMITY (HCC): Primary | ICD-10-CM

## 2021-08-20 DIAGNOSIS — I87.311 CHRONIC VENOUS HYPERTENSION (IDIOPATHIC) WITH ULCER OF RIGHT LOWER EXTREMITY (HCC): Primary | ICD-10-CM

## 2021-08-20 PROCEDURE — NC001 PR NO CHARGE

## 2021-08-20 PROCEDURE — 29581 APPL MULTLAYER CMPRN SYS LEG: CPT

## 2021-08-20 NOTE — PROGRESS NOTES
Cast Application    Date/Time: 8/20/2021 2:33 PM  Performed by: Radha Mas LPN  Authorized by: DO Sara Castañeda Protocol:  Consent: Verbal consent obtained    Risks and benefits: risks, benefits and alternatives were discussed  Consent given by: patient  Timeout called at: 8/20/2021 2:33 PM   Patient understanding: patient states understanding of the procedure being performed  Patient consent: the patient's understanding of the procedure matches consent given  Procedure consent: procedure consent matches procedure scheduled  Relevant documents: relevant documents present and verified  Patient identity confirmed: verbally with patient      Pre-procedure details:     Sensation:  Normal  Procedure details:     Laterality:  Right    Location:  Leg    Leg:  R lower legCast type:  Multi-layer compression short leg      Supplies:  2 layer wrap

## 2021-08-24 ENCOUNTER — OFFICE VISIT (OUTPATIENT)
Dept: WOUND CARE | Facility: CLINIC | Age: 81
End: 2021-08-24
Payer: MEDICARE

## 2021-08-24 VITALS
RESPIRATION RATE: 16 BRPM | SYSTOLIC BLOOD PRESSURE: 110 MMHG | HEART RATE: 80 BPM | DIASTOLIC BLOOD PRESSURE: 54 MMHG | TEMPERATURE: 97.5 F

## 2021-08-24 DIAGNOSIS — I87.311 CHRONIC VENOUS HYPERTENSION (IDIOPATHIC) WITH ULCER OF RIGHT LOWER EXTREMITY (HCC): Primary | ICD-10-CM

## 2021-08-24 DIAGNOSIS — L97.919 CHRONIC VENOUS HYPERTENSION (IDIOPATHIC) WITH ULCER OF RIGHT LOWER EXTREMITY (HCC): Primary | ICD-10-CM

## 2021-08-24 DIAGNOSIS — L97.218 NON-PRESSURE CHRONIC ULCER OF RIGHT CALF WITH OTHER SPECIFIED SEVERITY (HCC): ICD-10-CM

## 2021-08-24 PROCEDURE — 11042 DBRDMT SUBQ TIS 1ST 20SQCM/<: CPT | Performed by: PREVENTIVE MEDICINE

## 2021-08-24 RX ORDER — LIDOCAINE HYDROCHLORIDE 40 MG/ML
5 SOLUTION TOPICAL ONCE
Status: COMPLETED | OUTPATIENT
Start: 2021-08-24 | End: 2021-08-24

## 2021-08-24 RX ADMIN — LIDOCAINE HYDROCHLORIDE 5 ML: 40 SOLUTION TOPICAL at 08:24

## 2021-08-24 NOTE — PROGRESS NOTES
Cast Application    Date/Time: 8/24/2021 8:28 AM  Performed by: Adria Daniel RN  Authorized by: DO Sara Karimi Protocol:  Consent: Verbal consent obtained  Risks and benefits: risks, benefits and alternatives were discussed  Consent given by: patient  Patient understanding: patient states understanding of the procedure being performed  Patient identity confirmed: verbally with patient      Pre-procedure details:     Sensation:  Normal  Procedure details:     Laterality:  Right    Location:  Leg    Leg:  R lower leg    Strapping: no  Cast type:  Multi-layer compression short leg      Supplies:  2 layer wrap  Post-procedure details:     Pain:  Unchanged    Sensation:  Normal    Patient tolerance of procedure: Tolerated well, no immediate complications  Comments:      Multiplayer wrap procedure     Before application, DANIA and/or TBI determined to be adequate for healing and application of compression  Lower extremity washed prior to application of compression wrap  With the foot in dorsiflexed position, coflex tlc was applied as per physician orders without complications or complaint of pain  The procedure was tolerated well  Toes warm & pink post application    Patient provided education & reinforced to observe toes for any discoloration, swelling or tingling and instructed when to report to the [address] [facility],[address] or to remove compression

## 2021-08-24 NOTE — PATIENT INSTRUCTIONS
Orders Placed This Encounter   Procedures    Wound cleansing and dressings     Right lower leg wound  Wash your hands with soap and water  Remove old dressing, discard into plastic bag and place in trash  Cleanse the wound with soap and water prior to applying a clean dressing  Do not use tissue or cotton balls  Do not scrub the wound  Pat dry using gauze  Shower with protection   Apply moisturizing to skin surrounding wound  Apply Endoform to the wound  Cover with Huitron Oyster  Secure with Coflex TLC  Change dressing weekly with wrap change  This was done today  Standing Status:   Future     Standing Expiration Date:   8/24/2022    Wound compression and edema control     Right lower leg    Unna Boot/ Multi-layer compression wrap Instructions    Keep compression wrap/wraps clean and dry  If wraps are too tight and you experience numbness/tingling, call the wound center  If after hours, remove wraps or proceed to nearest E R  and call wound center in AM     [facility] will be changed 1 x weekly    Avoid prolonged standing in one place  Elevate leg(s) above the level of the heart when sitting or as much as possible  This was applied today per protocol       Standing Status:   Future     Standing Expiration Date:   8/24/2022

## 2021-08-24 NOTE — PROGRESS NOTES
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)  -     lidocaine (XYLOCAINE) 4 % topical solution 5 mL  -     Wound cleansing and dressings; Future  -     Wound compression and edema control; Future  -     Debridement       Diagnosis ICD-10-CM Associated Orders   1  Chronic venous hypertension (idiopathic) with ulcer of right lower extremity ([address] )  I87 311     L97 919    2  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)  L97 218 lidocaine (XYLOCAINE) 4 % topical solution 5 mL     Wound cleansing and dressings     Wound compression and edema control     Debridement        Assessment & Plan:  See wound orders  Tolerating compression and drainage has decreased  Increase compression with regular coflex wrap, switch to endoform to ulcer  Counseled on importance of frequent elevation of leg and increased exercise/walking  F/u 1 week    Subjective:   F/u RLE ulcer  No new complaints      The following portions of the patient's history were reviewed and updated as appropriate:   Patient Active Problem List   Diagnosis    Chronic venous hypertension (idiopathic) with ulcer of right lower extremity ([facility] )    Patient is Latter-day    Mild protein-calorie malnutrition ([address] )      Past Medical History:   Diagnosis Date    Dementia ([address] )     Memory loss 2018     Past Surgical History:   Procedure Laterality Date    ANKLE FRACTURE SURGERY  1996     Social History     Socioeconomic History    Marital status:       Spouse name: None    Number of children: None    Years of education: None    Highest education level: None   Occupational History    None   Tobacco Use    Smoking status: Never Smoker    Smokeless tobacco: Never Used   Substance and Sexual Activity    Alcohol use: Yes    Drug use: No    Sexual activity: Not Currently     Partners: Male     Birth control/protection: None   Other Topics Concern    None   Social History Narrative    Lives alone      Social Determinants of Health     Financial Resource Strain:     Difficulty of Paying Living Expenses:    Food Insecurity:     Worried About Running Out of Food in the Last Year:     [address] in the Last Year:    Transportation Needs:     Lack of Transportation (Medical):  Lack of Transportation (Non-Medical):    Physical Activity:     Days of Exercise per Week:     Minutes of Exercise per Session:    Stress:     Feeling of Stress :    Social Connections:     Frequency of Communication with Friends and Family:     Frequency of Social Gatherings with Friends and Family:     Attends Jew Services:     Active Member of Clubs or Organizations:     Attends Club or Organization Meetings:     Marital Status:    Intimate Partner Violence:     Fear of Current or Ex-Partner:     Emotionally Abused:     Physically Abused:     Sexually Abused:       No current outpatient medications on file  No current facility-administered medications for this visit  Family History   Problem Relation Age of Onset    Dementia Sister         Suri's sister is     Cancer Father       Review of Systems   Constitutional: Negative  Respiratory: Negative  Cardiovascular: Positive for leg swelling  Skin: Positive for wound  Objective:  /54   Pulse 80   Temp 97 5 °F (36 4 °C)   Resp 16     Physical Exam  Vitals reviewed  Constitutional:       General: She is not in acute distress  Cardiovascular:      Rate and Rhythm: Normal rate  Pulmonary:      Effort: Pulmonary effort is normal    Musculoskeletal:      Right lower leg: Edema present     Skin: Comments: See wound assessment   Neurological:      General: No focal deficit present  Mental Status: She is alert and oriented to person, place, and time  Psychiatric:         Mood and Affect: Mood normal          Behavior: Behavior normal              Wound 07/13/21 Venous Ulcer Pretibial Distal;Right (Active)   Wound Image   08/24/21 0820   Wound Description Pink;Yellow;Slough;Granulation tissue 08/24/21 0820   Vandana-wound Assessment Hyperpigmented;Pink 08/24/21 0820   Wound Length (cm) 2 2 cm 08/24/21 0820   Wound Width (cm) 2 1 cm 08/24/21 0820   Wound Depth (cm) 0 1 cm 08/24/21 0820   Wound Surface Area (cm^2) 4 62 cm^2 08/24/21 0820   Wound Volume (cm^3) 0 462 cm^3 08/24/21 0820   Calculated Wound Volume (cm^3) 0 46 cm^3 08/24/21 0820   Change in Wound Size % 4 17 08/24/21 0820   Drainage Amount Moderate 08/24/21 0820   Drainage Description Serosanguineous 08/24/21 0820   Non-staged Wound Description Full thickness 08/24/21 0820   Dressing Status Intact 08/24/21 0820                         Debridement   Wound 07/13/21 Venous Ulcer Pretibial Distal;Right    Universal Protocol:  Consent: Written consent obtained  Risks and benefits: risks, benefits and alternatives were discussed  Consent given by: patient  Time out: Immediately prior to procedure a "time out" was called to verify the correct patient, procedure, equipment, support staff and site/side marked as required    Patient identity confirmed: verbally with patient      Performed by: physician  Debridement type: surgical  Level of debridement: subcutaneous tissue  Pain control: lidocaine 4%  Post-debridement measurements  Length (cm): 2 2  Width (cm): 2 1  Depth (cm): 0 2  Percent debrided: 100%  Surface Area (cm^2): 4 62  Area debrided (cm^2): 4 62  Volume (cm^3): 0 92  Tissue and other material debrided: subcutaneous tissue  Devitalized tissue debrided: fibrin and slough  Instrument(s) utilized: curette  Bleeding: small  Hemostasis obtained with: pressure  Procedural pain (0-10): 2  Post-procedural pain: 0   Response to treatment: procedure was tolerated well                   Wound Instructions:  Orders Placed This Encounter   Procedures    Wound cleansing and dressings     Right lower leg wound  Wash your hands with soap and water  Remove old dressing, discard into plastic bag and place in trash  Cleanse the wound with soap and water prior to applying a clean dressing  Do not use tissue or cotton balls  Do not scrub the wound  Pat dry using gauze  Shower with protection   Apply moisturizing to skin surrounding wound  Apply Endoform to the wound  Cover with Cristopher Roldan  Secure with Coflex TLC  Change dressing weekly with wrap change  This was done today  Standing Status:   Future     Standing Expiration Date:   8/24/2022    Wound compression and edema control     Right lower leg    Unna Boot/ Multi-layer compression wrap Instructions    Keep compression wrap/wraps clean and dry  If wraps are too tight and you experience numbness/tingling, call the wound center  If after hours, remove wraps or proceed to nearest E R  and call wound center in AM     Karon Hands will be changed 1 x weekly    Avoid prolonged standing in one place  Elevate leg(s) above the level of the heart when sitting or as much as possible  This was applied today per protocol  Standing Status:   Future     Standing Expiration Date:   8/24/2022    Debridement     This order was created via procedure documentation         Adal Bland DO      Portions of the record may have been created with voice recognition software  Occasional wrong word or "sound a like" substitutions may have occurred due to the inherent limitations of voice recognition software  Read the chart carefully and recognize, using context, where substitutions have occurred

## 2021-08-31 ENCOUNTER — OFFICE VISIT (OUTPATIENT)
Dept: WOUND CARE | Facility: CLINIC | Age: 81
End: 2021-08-31
Payer: MEDICARE

## 2021-08-31 VITALS
TEMPERATURE: 97.8 F | DIASTOLIC BLOOD PRESSURE: 74 MMHG | HEART RATE: 82 BPM | SYSTOLIC BLOOD PRESSURE: 114 MMHG | RESPIRATION RATE: 18 BRPM

## 2021-08-31 DIAGNOSIS — I87.311 CHRONIC VENOUS HYPERTENSION (IDIOPATHIC) WITH ULCER OF RIGHT LOWER EXTREMITY (HCC): Primary | ICD-10-CM

## 2021-08-31 DIAGNOSIS — L97.919 CHRONIC VENOUS HYPERTENSION (IDIOPATHIC) WITH ULCER OF RIGHT LOWER EXTREMITY (HCC): Primary | ICD-10-CM

## 2021-08-31 PROCEDURE — 11042 DBRDMT SUBQ TIS 1ST 20SQCM/<: CPT | Performed by: FAMILY MEDICINE

## 2021-08-31 RX ORDER — LIDOCAINE HYDROCHLORIDE 40 MG/ML
5 SOLUTION TOPICAL ONCE
Status: COMPLETED | OUTPATIENT
Start: 2021-08-31 | End: 2021-08-31

## 2021-08-31 RX ADMIN — LIDOCAINE HYDROCHLORIDE 5 ML: 40 SOLUTION TOPICAL at 08:45

## 2021-08-31 NOTE — ASSESSMENT & PLAN NOTE
Wound is improved   Debrided as below  Continue wound management, see wound as below   Continue compression with coflex   Followup in 1 week or call sooner with questions or concerns

## 2021-08-31 NOTE — PROGRESS NOTES
Patient ID: Dawn Wagner is a 78 y o  female Date of Birth 1940     Chief Complaint  Chief Complaint   Patient presents with    Follow Up Wound Care Visit     ulcer of right lower extremity       Allergies  Patient has no known allergies  Assessment:    Chronic venous hypertension (idiopathic) with ulcer of right lower extremity (HCC)   Wound is improved   Debrided as below  Continue wound management, see wound as below   Continue compression with coflex   Followup in 1 week or call sooner with questions or concerns       Diagnoses and all orders for this visit:    Chronic venous hypertension (idiopathic) with ulcer of right lower extremity (HCC)  -     Wound cleansing and dressings; Future  -     Wound compression and edema control; Future  -     lidocaine (XYLOCAINE) 4 % topical solution 5 mL  -     Debridement              Debridement   Wound 07/13/21 Venous Ulcer Pretibial Distal;Right    Universal Protocol:  Consent: Verbal consent obtained  Consent given by: patient  Time out: Immediately prior to procedure a "time out" was called to verify the correct patient, procedure, equipment, support staff and site/side marked as required    Timeout called at: 8/31/2021 8:30 AM   Patient understanding: patient states understanding of the procedure being performed  Patient identity confirmed: verbally with patient      Performed by: physician  Debridement type: surgical  Level of debridement: subcutaneous tissue  Pain control: lidocaine 4%  Post-debridement measurements  Length (cm): 2  Width (cm): 1 7  Depth (cm): 0 2  Percent debrided: 100%  Surface Area (cm^2): 3 4  Area debrided (cm^2): 3 4  Volume (cm^3): 0 68  Tissue and other material debrided: subcutaneous tissue  Devitalized tissue debrided: exudate and slough  Instrument(s) utilized: curette  Bleeding: small  Hemostasis obtained with: pressure  Procedural pain (0-10): 0  Post-procedural pain: 0   Response to treatment: procedure was tolerated well          Plan:     Wound 07/13/21 Venous Ulcer Pretibial Distal;Right (Active)   Wound Image Images linked 08/31/21 0807   Wound Description Pink;Yellow;Slough;Granulation tissue 08/31/21 0807   Vandana-wound Assessment Hyperpigmented;[place] 08/31/21 0807   Wound Length (cm) 2 cm 08/31/21 0807   Wound Width (cm) 1 7 cm 08/31/21 0807   Wound Depth (cm) 0 1 cm 08/31/21 0807   Wound Surface Area (cm^2) 3 4 cm^2 08/31/21 0807   Wound Volume (cm^3) 0 34 cm^3 08/31/21 0807   Calculated Wound Volume (cm^3) 0 34 cm^3 08/31/21 0807   Change in Wound Size % 29 17 08/31/21 0807   Drainage Amount Moderate 08/31/21 0807   Drainage Description Serosanguineous 08/31/21 0807   Non-staged Wound Description Full thickness 08/31/21 0807   Dressing Status Intact 08/31/21 0807       Wound 07/13/21 Venous Ulcer Pretibial Distal;Right (Active)   Date First Assessed/Time First Assessed: 07/13/21 1410   Primary Wound Type: Venous Ulcer  Location: Pretibial  Wound Location Orientation: Distal;Right       Subjective:        Patient presents for followup of right lower extremity venous ulcer  No increased pain or drainage  No new complaints  Has been using endoform on the wound and coflex for compression  The following portions of the patient's history were reviewed and updated as appropriate: She  has a past medical history of Dementia ([facility] [address] ) and Memory loss (2018)       Review of Systems   Constitutional: Negative for chills and fever  HENT: Negative for congestion and sneezing  Respiratory: Negative for cough  Cardiovascular: Positive for leg swelling  Skin: Positive for wound  Psychiatric/Behavioral: Negative for agitation           Objective:       Wound 07/13/21 Venous Ulcer Pretibial Distal;Right (Active)   Wound Image Images linked 08/31/21 0807   Wound Description Pink;Yellow;Slough;Granulation tissue 08/31/21 0807   Vandana-wound Assessment Hyperpigmented;[place] 08/31/21 0807   Wound Length (cm) 2 cm 08/31/21 0748 Wound Width (cm) 1 7 cm 08/31/21 0807   Wound Depth (cm) 0 1 cm 08/31/21 0807   Wound Surface Area (cm^2) 3 4 cm^2 08/31/21 0807   Wound Volume (cm^3) 0 34 cm^3 08/31/21 0807   Calculated Wound Volume (cm^3) 0 34 cm^3 08/31/21 0807   Change in Wound Size % 29 17 08/31/21 0807   Drainage Amount Moderate 08/31/21 0807   Drainage Description Serosanguineous 08/31/21 0807   Non-staged Wound Description Full thickness 08/31/21 0807   Dressing Status Intact 08/31/21 0807       /74   Pulse 82   Temp 97 8 °F (36 6 °C)   Resp 18     Physical Exam        Wound 07/13/21 Venous Ulcer Pretibial Distal;Right (Active)   Wound Image   08/31/21 0807   Wound Description Pink;Yellow;Slough;Granulation tissue 08/31/21 0807   Vandana-wound Assessment Hyperpigmented;[place] 08/31/21 0807   Wound Length (cm) 2 cm 08/31/21 0807   Wound Width (cm) 1 7 cm 08/31/21 0807   Wound Depth (cm) 0 1 cm 08/31/21 0807   Wound Surface Area (cm^2) 3 4 cm^2 08/31/21 0807   Wound Volume (cm^3) 0 34 cm^3 08/31/21 0807   Calculated Wound Volume (cm^3) 0 34 cm^3 08/31/21 0807   Change in Wound Size % 29 17 08/31/21 0807   Drainage Amount Moderate 08/31/21 0807   Drainage Description Serosanguineous 08/31/21 0807   Non-staged Wound Description Full thickness 08/31/21 0807   Dressing Status Intact 08/31/21 0807                       Wound Instructions:  Orders Placed This Encounter   Procedures    Wound cleansing and dressings     Wound cleansing and dressings       Right lower leg wound  Wash your hands with soap and water  Remove old dressing, discard into plastic bag and place in trash  Cleanse the wound with soap and water prior to applying a clean dressing  Do not use tissue or cotton balls  Do not scrub the wound  Pat dry using gauze  Shower with protection   Apply moisturizing to skin surrounding wound  Apply Endoform to the wound  Cover with Keith Dark  Secure with Coflex TLC  Change dressing weekly with wrap change    This was done today           Standing Status:   Future     Standing Expiration Date:   8/31/2022    Wound compression and edema control      Wound compression and Right lower leg edema control    Multi-layer compression wrap Instructions     Keep compression wrap/wraps clean and dry  If wraps are too tight and you experience numbness/tingling, call the wound center  If after hours, remove wraps or proceed to nearest E R  and call wound center in AM      Wrap will be changed 1 x weekly     Avoid prolonged standing in one place      Elevate leg(s) above the level of the heart when sitting or as much as possible       This was applied today per protocol  Standing Status:   Future     Standing Expiration Date:   8/31/2022    Debridement     This order was created via procedure documentation        Diagnosis ICD-10-CM Associated Orders   1   Chronic venous hypertension (idiopathic) with ulcer of right lower extremity (HCC)  I87 311 Wound cleansing and dressings    L97 919 Wound compression and edema control     lidocaine (XYLOCAINE) 4 % topical solution 5 mL     Debridement

## 2021-08-31 NOTE — PATIENT INSTRUCTIONS
Orders Placed This Encounter   Procedures    Wound cleansing and dressings     Wound cleansing and dressings       Right lower leg wound  Wash your hands with soap and water  Remove old dressing, discard into plastic bag and place in trash  Cleanse the wound with soap and water prior to applying a clean dressing  Do not use tissue or cotton balls  Do not scrub the wound  Pat dry using gauze  Shower with protection   Apply moisturizing to skin surrounding wound  Apply Endoform to the wound  Cover with Mary Sales  Secure with Coflex TLC  Change dressing weekly with wrap change  This was done today           Standing Status:   Future     Standing Expiration Date:   8/31/2022    Wound compression and edema control      Wound compression and Right lower leg edema control    Multi-layer compression wrap Instructions     Keep compression wrap/wraps clean and dry  If wraps are too tight and you experience numbness/tingling, call the wound center  If after hours, remove wraps or proceed to nearest E R  and call wound center in AM      Wrap will be changed 1 x weekly     Avoid prolonged standing in one place      Elevate leg(s) above the level of the heart when sitting or as much as possible       This was applied today per protocol       Standing Status:   Future     Standing Expiration Date:   8/31/2022

## 2021-09-07 ENCOUNTER — OFFICE VISIT (OUTPATIENT)
Dept: WOUND CARE | Facility: CLINIC | Age: 81
End: 2021-09-07
Payer: MEDICARE

## 2021-09-07 VITALS
RESPIRATION RATE: 18 BRPM | DIASTOLIC BLOOD PRESSURE: 60 MMHG | HEART RATE: 72 BPM | TEMPERATURE: 97.3 F | SYSTOLIC BLOOD PRESSURE: 128 MMHG

## 2021-09-07 DIAGNOSIS — L97.919 CHRONIC VENOUS HYPERTENSION (IDIOPATHIC) WITH ULCER OF RIGHT LOWER EXTREMITY (HCC): Primary | ICD-10-CM

## 2021-09-07 DIAGNOSIS — I87.311 CHRONIC VENOUS HYPERTENSION (IDIOPATHIC) WITH ULCER OF RIGHT LOWER EXTREMITY (HCC): Primary | ICD-10-CM

## 2021-09-07 PROCEDURE — 11042 DBRDMT SUBQ TIS 1ST 20SQCM/<: CPT | Performed by: FAMILY MEDICINE

## 2021-09-07 RX ORDER — LIDOCAINE HYDROCHLORIDE 40 MG/ML
5 SOLUTION TOPICAL ONCE
Status: COMPLETED | OUTPATIENT
Start: 2021-09-07 | End: 2021-09-07

## 2021-09-07 RX ADMIN — LIDOCAINE HYDROCHLORIDE 5 ML: 40 SOLUTION TOPICAL at 08:06

## 2021-09-07 NOTE — PROGRESS NOTES
Patient ID: Liang Sun is a 78 y o  female Date of Birth 1940     Chief Complaint  Chief Complaint   Patient presents with    Follow Up Wound Care Visit     RLE wound       Allergies  Patient has no known allergies  Assessment:    Chronic venous hypertension (idiopathic) with ulcer of right lower extremity (HCC)   Wound is improved   Debrided as below   Continue wound management with and form   Continue compression with coflex   Followup in 1 week or call sooner with questions or concerns       Diagnoses and all orders for this visit:    Chronic venous hypertension (idiopathic) with ulcer of right lower extremity (HCC)  -     lidocaine (XYLOCAINE) 4 % topical solution 5 mL  -     Wound cleansing and dressings; Future  -     Wound compression and edema control; Future  -     Debridement              Debridement   Wound 07/13/21 Venous Ulcer Pretibial Distal;Right    Universal Protocol:  Consent: Verbal consent obtained  Consent given by: patient  Time out: Immediately prior to procedure a "time out" was called to verify the correct patient, procedure, equipment, support staff and site/side marked as required    Timeout called at: 9/7/2021 8:20 AM   Patient understanding: patient states understanding of the procedure being performed  Patient identity confirmed: verbally with patient      Performed by: physician  Debridement type: surgical  Level of debridement: subcutaneous tissue  Pain control: lidocaine 4%  Post-debridement measurements  Length (cm): 1 9  Width (cm): 1 5  Depth (cm): 0 2  Percent debrided: 100%  Surface Area (cm^2): 2 85  Area debrided (cm^2): 2 85  Volume (cm^3): 0 57  Tissue and other material debrided: subcutaneous tissue  Devitalized tissue debrided: exudate and slough  Instrument(s) utilized: curette  Bleeding: small  Hemostasis obtained with: pressure  Procedural pain (0-10): 0  Post-procedural pain: 0   Response to treatment: procedure was tolerated well          Plan:     Wound 07/13/21 Venous Ulcer Pretibial Distal;Right (Active)   Wound Image Images linked 09/07/21 0828   Wound Description Pink;Yellow;Granulation tissue; Epithelialization 09/07/21 0808   Vandana-wound Assessment Hyperpigmented;[place] 09/07/21 1438   Wound Length (cm) 1 9 cm 09/07/21 0808   Wound Width (cm) 1 5 cm 09/07/21 0808   Wound Depth (cm) 0 1 cm 09/07/21 0808   Wound Surface Area (cm^2) 2 85 cm^2 09/07/21 0808   Wound Volume (cm^3) 0 285 cm^3 09/07/21 0808   Calculated Wound Volume (cm^3) 0 28 cm^3 09/07/21 0808   Change in Wound Size % 41 67 09/07/21 0808   Drainage Amount Moderate 09/07/21 0808   Drainage Description Serosanguineous 09/07/21 0808   Non-staged Wound Description Full thickness 09/07/21 0808   Dressing Status Intact 09/07/21 0808       Wound 07/13/21 Venous Ulcer Pretibial Distal;Right (Active)   Date First Assessed/Time First Assessed: 07/13/21 1410   Primary Wound Type: Venous Ulcer  Location: Pretibial  Wound Location Orientation: Distal;Right       Subjective:        Patient presents for followup of right lower extremity venous ulcer  No increased pain or drainage  Has been using endoform on the wound and coflex for compression      The following portions of the patient's history were reviewed and updated as appropriate: She  has a past medical history of Dementia ([address] ) and Memory loss (2018)  She  reports that she has never smoked  She has never used smokeless tobacco  She reports current alcohol use  She reports that she does not use drugs  She has No Known Allergies       Review of Systems   Constitutional: Negative for chills and fever  HENT: Negative for congestion and sneezing  Respiratory: Negative for cough  Cardiovascular: Positive for leg swelling  Skin: Positive for wound  Psychiatric/Behavioral: Negative for agitation           Objective:       Wound 07/13/21 Venous Ulcer Pretibial Distal;Right (Active)   Wound Image Images linked 09/07/21 0828   Wound Description Pink;Yellow;Granulation tissue; Epithelialization 09/07/21 0808   Vandana-wound Assessment Hyperpigmented;[place] 09/07/21 0808   Wound Length (cm) 1 9 cm 09/07/21 0808   Wound Width (cm) 1 5 cm 09/07/21 0808   Wound Depth (cm) 0 1 cm 09/07/21 0808   Wound Surface Area (cm^2) 2 85 cm^2 09/07/21 0808   Wound Volume (cm^3) 0 285 cm^3 09/07/21 0808   Calculated Wound Volume (cm^3) 0 28 cm^3 09/07/21 0808   Change in Wound Size % 41 67 09/07/21 0808   Drainage Amount Moderate 09/07/21 0808   Drainage Description Serosanguineous 09/07/21 0808   Non-staged Wound Description Full thickness 09/07/21 0808   Dressing Status Intact 09/07/21 0808       /60   Pulse 72   Temp (!) 97 3 °F (36 3 °C)   Resp 18     Physical Exam        Wound 07/13/21 Venous Ulcer Pretibial Distal;Right (Active)   Wound Image   09/07/21 0828   Wound Description Pink;Yellow;Granulation tissue; Epithelialization 09/07/21 0808   Vandana-wound Assessment Hyperpigmented;[place] 09/07/21 0808   Wound Length (cm) 1 9 cm 09/07/21 0808   Wound Width (cm) 1 5 cm 09/07/21 0808   Wound Depth (cm) 0 1 cm 09/07/21 0808   Wound Surface Area (cm^2) 2 85 cm^2 09/07/21 0808   Wound Volume (cm^3) 0 285 cm^3 09/07/21 0808   Calculated Wound Volume (cm^3) 0 28 cm^3 09/07/21 0808   Change in Wound Size % 41 67 09/07/21 0808   Drainage Amount Moderate 09/07/21 0808   Drainage Description Serosanguineous 09/07/21 0808   Non-staged Wound Description Full thickness 09/07/21 4563   Dressing Status Intact 09/07/21 0808                       Wound Instructions:  Orders Placed This Encounter   Procedures    Wound cleansing and dressings     Right lower leg wound  Wash your hands with soap and water  Remove old dressing, discard into plastic bag and place in trash  Cleanse the wound with soap and water prior to applying a clean dressing  Do not use tissue or cotton balls  Do not scrub the wound  Pat dry using gauze  Ok to shower with protection, do not get wound wet       Apply moisturizing to skin surrounding wound  Apply Endoform to the wound  Cover with [place] Oliva  Secure with Coflex TLC    Change dressing weekly with wrap change  This was done today                          Standing Status:   Future     Standing Expiration Date:   9/7/2022    Wound compression and edema control     Multi-layer compression wrap Instructions     Keep compression wrap/wraps clean and dry  If wraps are too tight and you experience numbness/tingling, call the wound center  If after hours, remove wraps or proceed to nearest E R  and call wound center in AM      Wrap will be changed 1 x weekly     Avoid prolonged standing in one place      Elevate leg(s) above the level of the heart when sitting or as much as possible       This was applied today per protocol  Standing Status:   Future     Standing Expiration Date:   9/7/2022    Debridement     This order was created via procedure documentation        Diagnosis ICD-10-CM Associated Orders   1   Chronic venous hypertension (idiopathic) with ulcer of right lower extremity (HCC)  I87 311 lidocaine (XYLOCAINE) 4 % topical solution 5 mL    L97 919 Wound cleansing and dressings     Wound compression and edema control     Debridement

## 2021-09-07 NOTE — ASSESSMENT & PLAN NOTE
Wound is improved   Debrided as below   Continue wound management with and form   Continue compression with coflex   Followup in 1 week or call sooner with questions or concerns

## 2021-09-07 NOTE — PATIENT INSTRUCTIONS
Orders Placed This Encounter   Procedures    Wound cleansing and dressings     Right lower leg wound  Wash your hands with soap and water  Remove old dressing, discard into plastic bag and place in trash  Cleanse the wound with soap and water prior to applying a clean dressing  Do not use tissue or cotton balls  Do not scrub the wound  Pat dry using gauze  Ok to shower with protection, do not get wound wet  Apply moisturizing to skin surrounding wound  Apply Endoform to the wound  Cover with Ryann Brass  Secure with Coflex TLC    Change dressing weekly with wrap change  This was done today                          Standing Status:   Future     Standing Expiration Date:   9/7/2022    Wound compression and edema control     Multi-layer compression wrap Instructions     Keep compression wrap/wraps clean and dry  If wraps are too tight and you experience numbness/tingling, call the wound center  If after hours, remove wraps or proceed to nearest E R  and call wound center in AM      Wrap will be changed 1 x weekly     Avoid prolonged standing in one place      Elevate leg(s) above the level of the heart when sitting or as much as possible       This was applied today per protocol       Standing Status:   Future     Standing Expiration Date:   9/7/2022

## 2021-09-14 ENCOUNTER — OFFICE VISIT (OUTPATIENT)
Dept: WOUND CARE | Facility: CLINIC | Age: 81
End: 2021-09-14
Payer: MEDICARE

## 2021-09-14 VITALS
DIASTOLIC BLOOD PRESSURE: 60 MMHG | HEART RATE: 80 BPM | SYSTOLIC BLOOD PRESSURE: 114 MMHG | TEMPERATURE: 97.6 F | RESPIRATION RATE: 18 BRPM

## 2021-09-14 DIAGNOSIS — L97.919 CHRONIC VENOUS HYPERTENSION (IDIOPATHIC) WITH ULCER OF RIGHT LOWER EXTREMITY (HCC): Primary | ICD-10-CM

## 2021-09-14 DIAGNOSIS — I87.311 CHRONIC VENOUS HYPERTENSION (IDIOPATHIC) WITH ULCER OF RIGHT LOWER EXTREMITY (HCC): Primary | ICD-10-CM

## 2021-09-14 PROCEDURE — 97597 DBRDMT OPN WND 1ST 20 CM/<: CPT | Performed by: FAMILY MEDICINE

## 2021-09-14 RX ORDER — LIDOCAINE HYDROCHLORIDE 40 MG/ML
5 SOLUTION TOPICAL ONCE
Status: COMPLETED | OUTPATIENT
Start: 2021-09-14 | End: 2021-09-14

## 2021-09-14 RX ADMIN — LIDOCAINE HYDROCHLORIDE 5 ML: 40 SOLUTION TOPICAL at 11:36

## 2021-09-14 NOTE — PROGRESS NOTES
Patient ID: Madiha Denton is a 78 y o  female Date of Birth 1940     Chief Complaint  Chief Complaint   Patient presents with    Follow Up Wound Care Visit     right leg ulcer       Allergies  Patient has no known allergies  Assessment:    Chronic venous hypertension (idiopathic) with ulcer of right lower extremity (HCC)   Wound is improved   debrided as below   Continue wound management with endoform   Continue compression with coflex  Followup in the 1 week or call sooner with questions or concerns         Diagnoses and all orders for this visit:    Chronic venous hypertension (idiopathic) with ulcer of right lower extremity (HCC)  -     lidocaine (XYLOCAINE) 4 % topical solution 5 mL  -     Wound cleansing and dressings; Future  -     Wound compression and edema control; Future  -     Debridement              Debridement   Wound 07/13/21 Venous Ulcer Pretibial Distal;Right    Universal Protocol:  Consent: Verbal consent obtained  Consent given by: patient  Time out: Immediately prior to procedure a "time out" was called to verify the correct patient, procedure, equipment, support staff and site/side marked as required    Timeout called at: 9/14/2021 11:40 AM   Patient understanding: patient states understanding of the procedure being performed  Patient identity confirmed: verbally with patient      Performed by: physician  Debridement type: selective  Pain control: lidocaine 4%  Post-debridement measurements  Length (cm): 1 5  Width (cm): 1 5  Depth (cm): 0 1  Percent debrided: 100%  Surface Area (cm^2): 2 25  Area debrided (cm^2): 2 25  Volume (cm^3): 0 23  Devitalized tissue debrided: biofilm  Instrument(s) utilized: curette  Bleeding: small  Hemostasis obtained with: pressure  Procedural pain (0-10): 0  Post-procedural pain: 0   Response to treatment: procedure was tolerated well          Plan:     Wound 07/13/21 Venous Ulcer Pretibial Distal;Right (Active)   Wound Image Images linked 09/14/21 1131 Wound Description Pink;Yellow;Granulation tissue; Epithelialization 09/14/21 1132   Vandana-wound Assessment Hyperpigmented;[place] 09/14/21 1132   Wound Length (cm) 1 5 cm 09/14/21 1132   Wound Width (cm) 1 5 cm 09/14/21 1132   Wound Depth (cm) 0 1 cm 09/14/21 1132   Wound Surface Area (cm^2) 2 25 cm^2 09/14/21 1132   Wound Volume (cm^3) 0 225 cm^3 09/14/21 1132   Calculated Wound Volume (cm^3) 0 23 cm^3 09/14/21 1132   Change in Wound Size % 52 08 09/14/21 1132   Drainage Amount Moderate 09/14/21 1132   Drainage Description Serosanguineous 09/14/21 1132   Non-staged Wound Description Full thickness 09/14/21 1132   Dressing Status Intact 09/14/21 1132       Wound 07/13/21 Venous Ulcer Pretibial Distal;Right (Active)   Date First Assessed/Time First Assessed: 07/13/21 1410   Primary Wound Type: Venous Ulcer  Location: Pretibial  Wound Location Orientation: Distal;Right       Subjective:        Patient presents for followup of right lower extremity venous ulcer  No new complaints, no increased pain or drainage  Has been using endoform on the wound and coflex for compression      The following portions of the patient's history were reviewed and updated as appropriate: She  has a past medical history of Dementia ([address] ) and Memory loss (2018)  She  reports that she has never smoked  She has never used smokeless tobacco  She reports current alcohol use  She reports that she does not use drugs  She has No Known Allergies       Review of Systems   Constitutional: Negative for chills and fever  HENT: Negative for congestion and sneezing  Respiratory: Negative for cough  Cardiovascular: Positive for leg swelling  Skin: Positive for wound  Psychiatric/Behavioral: Negative for agitation  Objective:       Wound 07/13/21 Venous Ulcer Pretibial Distal;Right (Active)   Wound Image Images linked 09/14/21 1131   Wound Description Pink;Yellow;Granulation tissue; Epithelialization 09/14/21 1132   Vandana-wound Assessment Hyperpigmented;[place] 09/14/21 1132   Wound Length (cm) 1 5 cm 09/14/21 1132   Wound Width (cm) 1 5 cm 09/14/21 1132   Wound Depth (cm) 0 1 cm 09/14/21 1132   Wound Surface Area (cm^2) 2 25 cm^2 09/14/21 1132   Wound Volume (cm^3) 0 225 cm^3 09/14/21 1132   Calculated Wound Volume (cm^3) 0 23 cm^3 09/14/21 1132   Change in Wound Size % 52 08 09/14/21 1132   Drainage Amount Moderate 09/14/21 1132   Drainage Description Serosanguineous 09/14/21 1132   Non-staged Wound Description Full thickness 09/14/21 1132   Dressing Status Intact 09/14/21 1132       /60   Pulse 80   Temp 97 6 °F (36 4 °C)   Resp 18     Physical Exam        Wound 07/13/21 Venous Ulcer Pretibial Distal;Right (Active)   Wound Image   09/14/21 1131   Wound Description Pink;Yellow;Granulation tissue; Epithelialization 09/14/21 1132   Vandana-wound Assessment Hyperpigmented;[place] 09/14/21 1132   Wound Length (cm) 1 5 cm 09/14/21 1132   Wound Width (cm) 1 5 cm 09/14/21 1132   Wound Depth (cm) 0 1 cm 09/14/21 1132   Wound Surface Area (cm^2) 2 25 cm^2 09/14/21 1132   Wound Volume (cm^3) 0 225 cm^3 09/14/21 1132   Calculated Wound Volume (cm^3) 0 23 cm^3 09/14/21 1132   Change in Wound Size % 52 08 09/14/21 1132   Drainage Amount Moderate 09/14/21 1132   Drainage Description Serosanguineous 09/14/21 1132   Non-staged Wound Description Full thickness 09/14/21 1132   Dressing Status Intact 09/14/21 1132                       Wound Instructions:  Orders Placed This Encounter   Procedures    Wound cleansing and dressings     Wound cleansing and dressings       Right lower leg wound  Wash your hands with soap and water  Remove old dressing, discard into plastic bag and place in trash  Cleanse the wound with soap and water prior to applying a clean dressing  Do not use tissue or cotton balls  Do not scrub the wound  Pat dry using gauze    Ok to shower with protection, do not get wound wet       Apply moisturizing to skin surrounding wound  Apply Endoform to the wound  Cover with Denette Splinter  Secure with Coflex TLC     Change dressing weekly with wrap change      This was done today                                           Standing Status:   Future     Standing Expiration Date:   9/14/2022    Wound compression and edema control      Wound compression and edema control       Keep compression wrap/wraps clean and dry  If wraps are too tight and you experience numbness/tingling, call the wound center  If after hours, remove wraps or proceed to nearest E R  and call wound center in AM      Wrap will be changed 1 x weekly     Avoid prolonged standing in one place      Elevate leg(s) above the level of the heart when sitting or as much as possible       This was applied today per protocol  Standing Status:   Future     Standing Expiration Date:   9/14/2022    Debridement     This order was created via procedure documentation        Diagnosis ICD-10-CM Associated Orders   1   Chronic venous hypertension (idiopathic) with ulcer of right lower extremity (HCC)  I87 311 lidocaine (XYLOCAINE) 4 % topical solution 5 mL    L97 919 Wound cleansing and dressings     Wound compression and edema control     Debridement

## 2021-09-14 NOTE — PATIENT INSTRUCTIONS
Orders Placed This Encounter   Procedures    Wound cleansing and dressings     Wound cleansing and dressings       Right lower leg wound  Wash your hands with soap and water  Remove old dressing, discard into plastic bag and place in trash  Cleanse the wound with soap and water prior to applying a clean dressing  Do not use tissue or cotton balls  Do not scrub the wound  Pat dry using gauze  Ok to shower with protection, do not get wound wet       Apply moisturizing to skin surrounding wound  Apply Endoform to the wound  Cover with Cristopher Roldan  Secure with Coflex TLC     Change dressing weekly with wrap change      This was done today                                           Standing Status:   Future     Standing Expiration Date:   9/14/2022    Wound compression and edema control      Wound compression and edema control       Keep compression wrap/wraps clean and dry  If wraps are too tight and you experience numbness/tingling, call the wound center  If after hours, remove wraps or proceed to nearest E R  and call wound center in AM      Wrap will be changed 1 x weekly     Avoid prolonged standing in one place      Elevate leg(s) above the level of the heart when sitting or as much as possible       This was applied today per protocol       Standing Status:   Future     Standing Expiration Date:   9/14/2022

## 2021-09-14 NOTE — ASSESSMENT & PLAN NOTE
Wound is improved   debrided as below   Continue wound management with endoform   Continue compression with coflex  Followup in the 1 week or call sooner with questions or concerns

## 2021-09-21 ENCOUNTER — OFFICE VISIT (OUTPATIENT)
Dept: WOUND CARE | Facility: CLINIC | Age: 81
End: 2021-09-21
Payer: MEDICARE

## 2021-09-21 VITALS — DIASTOLIC BLOOD PRESSURE: 61 MMHG | RESPIRATION RATE: 18 BRPM | HEART RATE: 70 BPM | SYSTOLIC BLOOD PRESSURE: 112 MMHG

## 2021-09-21 DIAGNOSIS — L97.919 CHRONIC VENOUS HYPERTENSION (IDIOPATHIC) WITH ULCER OF RIGHT LOWER EXTREMITY (HCC): Primary | ICD-10-CM

## 2021-09-21 DIAGNOSIS — I87.311 CHRONIC VENOUS HYPERTENSION (IDIOPATHIC) WITH ULCER OF RIGHT LOWER EXTREMITY (HCC): Primary | ICD-10-CM

## 2021-09-21 PROCEDURE — 11042 DBRDMT SUBQ TIS 1ST 20SQCM/<: CPT | Performed by: FAMILY MEDICINE

## 2021-09-21 RX ORDER — LIDOCAINE HYDROCHLORIDE 40 MG/ML
5 SOLUTION TOPICAL ONCE
Status: COMPLETED | OUTPATIENT
Start: 2021-09-21 | End: 2021-09-21

## 2021-09-21 RX ADMIN — LIDOCAINE HYDROCHLORIDE 5 ML: 40 SOLUTION TOPICAL at 09:49

## 2021-09-21 NOTE — PROGRESS NOTES
Patient ID: Piedad Buchanan is a 78 y o  female Date of Birth 1940     Chief Complaint  Chief Complaint   Patient presents with    Follow Up Wound Care Visit     RLE wounds       Allergies  Patient has no known allergies  Assessment:    Chronic venous hypertension (idiopathic) with ulcer of right lower extremity (HCC)   Wound is improved   Debrided as below   Continue wound management with endoform, see wound orders below   Continue compression with coflex   Followup in 1 week or call sooner with questions or concerns         Diagnoses and all orders for this visit:    Chronic venous hypertension (idiopathic) with ulcer of right lower extremity (HCC)  -     lidocaine (XYLOCAINE) 4 % topical solution 5 mL  -     Wound cleansing and dressings; Future  -     Wound compression and edema control; Future  -     Debridement              Debridement   Wound 07/13/21 Venous Ulcer Pretibial Distal;Right    Universal Protocol:  Consent: Verbal consent obtained  Consent given by: patient  Time out: Immediately prior to procedure a "time out" was called to verify the correct patient, procedure, equipment, support staff and site/side marked as required    Timeout called at: 9/21/2021 9:45 AM   Patient understanding: patient states understanding of the procedure being performed  Patient identity confirmed: verbally with patient      Performed by: physician  Debridement type: surgical  Level of debridement: subcutaneous tissue  Pain control: lidocaine 4%  Post-debridement measurements  Length (cm): 0 7  Width (cm): 0 8  Depth (cm): 0 2  Percent debrided: 100%  Surface Area (cm^2): 0 56  Area debrided (cm^2): 0 56  Volume (cm^3): 0 11  Tissue and other material debrided: subcutaneous tissue  Devitalized tissue debrided: exudate and slough  Instrument(s) utilized: curette  Bleeding: small  Hemostasis obtained with: pressure  Procedural pain (0-10): 0  Post-procedural pain: 0   Response to treatment: procedure was tolerated well          Plan:     Wound 07/13/21 Venous Ulcer Pretibial Distal;Right (Active)   Wound Image Images linked 09/21/21 0958   Wound Description Pink;Yellow;Granulation tissue; Epithelialization 09/21/21 0946   Vandana-wound Assessment Hyperpigmented;Pink 09/21/21 0946   Wound Length (cm) 0 7 cm 09/21/21 0946   Wound Width (cm) 0 7 cm 09/21/21 0946   Wound Depth (cm) 0 1 cm 09/21/21 0946   Wound Surface Area (cm^2) 0 49 cm^2 09/21/21 0946   Wound Volume (cm^3) 0 049 cm^3 09/21/21 0946   Calculated Wound Volume (cm^3) 0 05 cm^3 09/21/21 0946   Change in Wound Size % 89 58 09/21/21 0946   Drainage Amount Moderate 09/21/21 0946   Drainage Description Serosanguineous 09/21/21 0946   Non-staged Wound Description Full thickness 09/21/21 0946   Dressing Status Intact 09/21/21 0946       Wound 07/13/21 Venous Ulcer Pretibial Distal;Right (Active)   Date First Assessed/Time First Assessed: 07/13/21 1410   Primary Wound Type: Venous Ulcer  Location: Pretibial  Wound Location Orientation: Distal;Right       Subjective:        Patient presents for followup of right lower extremity venous ulcer  No increased pain or drainage  Has been using endoform on the wound and coflex for compression      The following portions of the patient's history were reviewed and updated as appropriate: She  has a past medical history of Dementia ([address] ) and Memory loss (2018)  She  reports that she has never smoked  She has never used smokeless tobacco  She reports current alcohol use  She reports that she does not use drugs  She has No Known Allergies       Review of Systems   Constitutional: Negative for chills and fever  HENT: Negative for congestion and sneezing  Respiratory: Negative for cough  Cardiovascular: Positive for leg swelling  Skin: Positive for wound  Psychiatric/Behavioral: Negative for agitation           Objective:       Wound 07/13/21 Venous Ulcer Pretibial Distal;Right (Active)   Wound Image Images linked 09/21/21 6357   Wound Description Pink;Yellow;Granulation tissue; Epithelialization 09/21/21 0946   Vandana-wound Assessment Hyperpigmented;Pink 09/21/21 0946   Wound Length (cm) 0 7 cm 09/21/21 0946   Wound Width (cm) 0 7 cm 09/21/21 0946   Wound Depth (cm) 0 1 cm 09/21/21 0946   Wound Surface Area (cm^2) 0 49 cm^2 09/21/21 0946   Wound Volume (cm^3) 0 049 cm^3 09/21/21 0946   Calculated Wound Volume (cm^3) 0 05 cm^3 09/21/21 0946   Change in Wound Size % 89 58 09/21/21 0946   Drainage Amount Moderate 09/21/21 0946   Drainage Description Serosanguineous 09/21/21 0946   Non-staged Wound Description Full thickness 09/21/21 0946   Dressing Status Intact 09/21/21 0946       /61   Pulse 70   Resp 18     Physical Exam        Wound 07/13/21 Venous Ulcer Pretibial Distal;Right (Active)   Wound Image   09/21/21 0958   Wound Description Pink;Yellow;Granulation tissue; Epithelialization 09/21/21 0946   Vandana-wound Assessment Hyperpigmented;Pink 09/21/21 0946   Wound Length (cm) 0 7 cm 09/21/21 0946   Wound Width (cm) 0 7 cm 09/21/21 0946   Wound Depth (cm) 0 1 cm 09/21/21 0946   Wound Surface Area (cm^2) 0 49 cm^2 09/21/21 0946   Wound Volume (cm^3) 0 049 cm^3 09/21/21 0946   Calculated Wound Volume (cm^3) 0 05 cm^3 09/21/21 0946   Change in Wound Size % 89 58 09/21/21 0946   Drainage Amount Moderate 09/21/21 0946   Drainage Description Serosanguineous 09/21/21 0946   Non-staged Wound Description Full thickness 09/21/21 0946   Dressing Status Intact 09/21/21 0946                       Wound Instructions:  Orders Placed This Encounter   Procedures    Wound cleansing and dressings     Right lower leg wound  Wash your hands with soap and water  Remove old dressing, discard into plastic bag and place in trash  Cleanse the wound with soap and water prior to applying a clean dressing  Do not use tissue or cotton balls  Do not scrub the wound  Pat dry using gauze    Ok to shower with protection, do not get wound wet       Apply moisturizing to skin surrounding wound  Apply Endoform to the wound  Cover with Britany Shipman  Secure with Coflex TLC     Change dressing weekly with wrap change      This was done today         Standing Status:   Future     Standing Expiration Date:   9/21/2022    Wound compression and edema control     Keep compression wrap/wraps clean and dry  If wraps are too tight and you experience numbness/tingling, call the wound center  If after hours, remove wraps or proceed to nearest E R  and call wound center in AM      Wrap will be changed 1 x weekly     Avoid prolonged standing in one place      Elevate leg(s) above the level of the heart when sitting or as much as possible       This was applied today per protocol  Standing Status:   Future     Standing Expiration Date:   9/21/2022    Debridement     This order was created via procedure documentation        Diagnosis ICD-10-CM Associated Orders   1   Chronic venous hypertension (idiopathic) with ulcer of right lower extremity (HCC)  I87 311 lidocaine (XYLOCAINE) 4 % topical solution 5 mL    L97 919 Wound cleansing and dressings     Wound compression and edema control     Debridement

## 2021-09-21 NOTE — PATIENT INSTRUCTIONS
Orders Placed This Encounter   Procedures    Wound cleansing and dressings     Right lower leg wound  Wash your hands with soap and water  Remove old dressing, discard into plastic bag and place in trash  Cleanse the wound with soap and water prior to applying a clean dressing  Do not use tissue or cotton balls  Do not scrub the wound  Pat dry using gauze  Ok to shower with protection, do not get wound wet       Apply moisturizing to skin surrounding wound  Apply Endoform to the wound  Cover with Philip Oliva  Secure with Coflex TLC     Change dressing weekly with wrap change      This was done today         Standing Status:   Future     Standing Expiration Date:   9/21/2022    Wound compression and edema control     Keep compression wrap/wraps clean and dry  If wraps are too tight and you experience numbness/tingling, call the wound center  If after hours, remove wraps or proceed to nearest E R  and call wound center in AM      Wrap will be changed 1 x weekly     Avoid prolonged standing in one place      Elevate leg(s) above the level of the heart when sitting or as much as possible       This was applied today per protocol       Standing Status:   Future     Standing Expiration Date:   9/21/2022

## 2021-09-21 NOTE — ASSESSMENT & PLAN NOTE
Wound is improved   Debrided as below   Continue wound management with endoform, see wound orders below   Continue compression with coflex   Followup in 1 week or call sooner with questions or concerns

## 2021-09-28 ENCOUNTER — OFFICE VISIT (OUTPATIENT)
Dept: WOUND CARE | Facility: CLINIC | Age: 81
End: 2021-09-28
Payer: MEDICARE

## 2021-09-28 VITALS
DIASTOLIC BLOOD PRESSURE: 70 MMHG | TEMPERATURE: 97.5 F | RESPIRATION RATE: 20 BRPM | HEART RATE: 72 BPM | SYSTOLIC BLOOD PRESSURE: 118 MMHG

## 2021-09-28 DIAGNOSIS — L97.919 CHRONIC VENOUS HYPERTENSION (IDIOPATHIC) WITH ULCER OF RIGHT LOWER EXTREMITY (HCC): Primary | ICD-10-CM

## 2021-09-28 DIAGNOSIS — I87.311 CHRONIC VENOUS HYPERTENSION (IDIOPATHIC) WITH ULCER OF RIGHT LOWER EXTREMITY (HCC): Primary | ICD-10-CM

## 2021-09-28 PROCEDURE — 99212 OFFICE O/P EST SF 10 MIN: CPT | Performed by: FAMILY MEDICINE

## 2021-09-28 PROCEDURE — 99213 OFFICE O/P EST LOW 20 MIN: CPT | Performed by: FAMILY MEDICINE

## 2021-09-28 RX ORDER — LIDOCAINE HYDROCHLORIDE 40 MG/ML
5 SOLUTION TOPICAL ONCE
Status: COMPLETED | OUTPATIENT
Start: 2021-09-28 | End: 2021-09-28

## 2021-09-28 RX ADMIN — LIDOCAINE HYDROCHLORIDE 5 ML: 40 SOLUTION TOPICAL at 08:13

## 2021-09-28 NOTE — PROGRESS NOTES
Patient ID: Altagracia Carl is a 78 y o  female Date of Birth 1940     Chief Complaint  Chief Complaint   Patient presents with    Follow Up Wound Care Visit     Right leg wound       Allergies  Patient has no known allergies  Assessment:    Chronic venous hypertension (idiopathic) with ulcer of right lower extremity (HCC)   Wound is closed   Keep the area covered and protected for about another week   Discussed the importance of long-term edema control and use of long-term compression    elevate legs whenever seated   Follow up p r n  or call with questions or concerns       Diagnoses and all orders for this visit:    Chronic venous hypertension (idiopathic) with ulcer of right lower extremity (HCC)  -     lidocaine (XYLOCAINE) 4 % topical solution 5 mL  -     Wound cleansing and dressings; Future  -     Wound compression and edema control; Future              Procedures    Plan:     Wound 07/13/21 Venous Ulcer Pretibial Distal;Right (Active)   Wound Image Images linked 09/28/21 0804   Wound Description Dry;Epithelialization 09/28/21 0809   Vandana-wound Assessment Clean;Dry 09/28/21 0809   Wound Length (cm) 0 cm 09/28/21 0809   Wound Width (cm) 0 cm 09/28/21 0809   Wound Depth (cm) 0 cm 09/28/21 0809   Wound Surface Area (cm^2) 0 cm^2 09/28/21 0809   Wound Volume (cm^3) 0 cm^3 09/28/21 0809   Calculated Wound Volume (cm^3) 0 cm^3 09/28/21 0809   Change in Wound Size % 100 09/28/21 0809   Drainage Amount None 09/28/21 0809   Non-staged Wound Description Full thickness 09/28/21 0809   Dressing Status Intact 09/28/21 0809       Wound 07/13/21 Venous Ulcer Pretibial Distal;Right (Active)   Date First Assessed/Time First Assessed: 07/13/21 1410   Primary Wound Type: Venous Ulcer  Location: Pretibial  Wound Location Orientation: Distal;Right  Wound Outcome: Healed       Subjective:        Patient presents for followup right lower extremity venous ulcer  No significant pain or drainage  No new complaints    Has been using endoform on the wound and coflex for compression      The following portions of the patient's history were reviewed and updated as appropriate: She  has a past medical history of Dementia ([address] ) and Memory loss (2018)  She  reports that she has never smoked  She has never used smokeless tobacco  She reports current alcohol use  She reports that she does not use drugs  She has No Known Allergies       Review of Systems   Constitutional: Negative for chills and fever  HENT: Negative for congestion and sneezing  Respiratory: Negative for cough  Cardiovascular: Positive for leg swelling  Skin: Positive for wound  Psychiatric/Behavioral: Negative for agitation  Objective:       Wound 07/13/21 Venous Ulcer Pretibial Distal;Right (Active)   Wound Image Images linked 09/28/21 0804   Wound Description Dry;Epithelialization 09/28/21 0809   Vandana-wound Assessment Clean;Dry 09/28/21 0809   Wound Length (cm) 0 cm 09/28/21 0809   Wound Width (cm) 0 cm 09/28/21 0809   Wound Depth (cm) 0 cm 09/28/21 0809   Wound Surface Area (cm^2) 0 cm^2 09/28/21 0809   Wound Volume (cm^3) 0 cm^3 09/28/21 0809   Calculated Wound Volume (cm^3) 0 cm^3 09/28/21 0809   Change in Wound Size % 100 09/28/21 0809   Drainage Amount None 09/28/21 0809   Non-staged Wound Description Full thickness 09/28/21 0809   Dressing Status Intact 09/28/21 0809       /70   Pulse 72   Temp 97 5 °F (36 4 °C)   Resp 20     Physical Exam  Constitutional:       General: She is not in acute distress  Appearance: Normal appearance  She is not ill-appearing, toxic-appearing or diaphoretic  HENT:      Head: Normocephalic and atraumatic  Right Ear: External ear normal       Left Ear: External ear normal    Eyes:      Conjunctiva/sclera: Conjunctivae normal    Pulmonary:      Effort: Pulmonary effort is normal  No respiratory distress  Musculoskeletal:      Cervical back: Neck supple     Skin:     Comments: Wound appears closed Neurological:      Mental Status: She is alert  Psychiatric:         Mood and Affect: Mood normal          Behavior: Behavior normal            Wound 07/13/21 Venous Ulcer Pretibial Distal;Right (Active)   Wound Image   09/28/21 0804   Wound Description Dry;Epithelialization 09/28/21 0809   Vandana-wound Assessment Clean;Dry 09/28/21 0809   Wound Length (cm) 0 cm 09/28/21 0809   Wound Width (cm) 0 cm 09/28/21 0809   Wound Depth (cm) 0 cm 09/28/21 0809   Wound Surface Area (cm^2) 0 cm^2 09/28/21 0809   Wound Volume (cm^3) 0 cm^3 09/28/21 0809   Calculated Wound Volume (cm^3) 0 cm^3 09/28/21 0809   Change in Wound Size % 100 09/28/21 0809   Drainage Amount None 09/28/21 0809   Non-staged Wound Description Full thickness 09/28/21 0809   Dressing Status Intact 09/28/21 0809                         Wound Instructions:  Orders Placed This Encounter   Procedures    Wound cleansing and dressings     Right Leg   Newly healed area cover with silicone bordered dressing for protection before compression stockings on in am off pm  Moisturize leg daily  Return to woundcare center only if needed      Standing Status:   Future     Standing Expiration Date:   9/28/2022    Wound compression and edema control     Compression Stockings Bilateral legs    Remove compression stockings every night HS and re-apply first thing qAM  Follow daily skin care as instructed  Avoid prolonged standing in one place  Elevate leg(s) above the level of the heart when sitting or as much as possible     Standing Status:   Future     Standing Expiration Date:   9/28/2022        Diagnosis ICD-10-CM Associated Orders   1   Chronic venous hypertension (idiopathic) with ulcer of right lower extremity (HCC)  I87 311 lidocaine (XYLOCAINE) 4 % topical solution 5 mL    L97 919 Wound cleansing and dressings     Wound compression and edema control

## 2021-09-28 NOTE — ASSESSMENT & PLAN NOTE
Wound is closed   Keep the area covered and protected for about another week   Discussed the importance of long-term edema control and use of long-term compression    elevate legs whenever seated   Follow up p r n  or call with questions or concerns

## 2021-09-28 NOTE — PATIENT INSTRUCTIONS
Orders Placed This Encounter   Procedures    Wound cleansing and dressings     Right Leg   Newly healed area cover with silicone bordered dressing for protection before compression stockings on in am off pm  Moisturize leg daily  Return to woundcare center only if needed      Standing Status:   Future     Standing Expiration Date:   9/28/2022    Wound compression and edema control     Compression Stockings Bilateral legs    Remove compression stockings every night HS and re-apply first thing qAM  Follow daily skin care as instructed  Avoid prolonged standing in one place      Elevate leg(s) above the level of the heart when sitting or as much as possible     Standing Status:   Future     Standing Expiration Date:   9/28/2022

## 2021-11-18 ENCOUNTER — OFFICE VISIT (OUTPATIENT)
Dept: FAMILY MEDICINE CLINIC | Facility: CLINIC | Age: 81
End: 2021-11-18
Payer: MEDICARE

## 2021-11-18 ENCOUNTER — APPOINTMENT (OUTPATIENT)
Dept: LAB | Facility: CLINIC | Age: 81
End: 2021-11-18
Payer: MEDICARE

## 2021-11-18 VITALS
HEART RATE: 78 BPM | TEMPERATURE: 96 F | BODY MASS INDEX: 19.35 KG/M2 | SYSTOLIC BLOOD PRESSURE: 112 MMHG | DIASTOLIC BLOOD PRESSURE: 68 MMHG | WEIGHT: 131 LBS | OXYGEN SATURATION: 95 %

## 2021-11-18 DIAGNOSIS — L97.919 CHRONIC VENOUS HYPERTENSION (IDIOPATHIC) WITH ULCER OF RIGHT LOWER EXTREMITY (HCC): Primary | ICD-10-CM

## 2021-11-18 DIAGNOSIS — L97.919 CHRONIC VENOUS HYPERTENSION (IDIOPATHIC) WITH ULCER OF RIGHT LOWER EXTREMITY (HCC): ICD-10-CM

## 2021-11-18 DIAGNOSIS — I87.311 CHRONIC VENOUS HYPERTENSION (IDIOPATHIC) WITH ULCER OF RIGHT LOWER EXTREMITY (HCC): ICD-10-CM

## 2021-11-18 DIAGNOSIS — I87.311 CHRONIC VENOUS HYPERTENSION (IDIOPATHIC) WITH ULCER OF RIGHT LOWER EXTREMITY (HCC): Primary | ICD-10-CM

## 2021-11-18 LAB
ALBUMIN SERPL BCP-MCNC: 3.8 G/DL (ref 3.5–5)
ALP SERPL-CCNC: 96 U/L (ref 46–116)
ALT SERPL W P-5'-P-CCNC: 30 U/L (ref 12–78)
ANION GAP SERPL CALCULATED.3IONS-SCNC: 4 MMOL/L (ref 4–13)
AST SERPL W P-5'-P-CCNC: 32 U/L (ref 5–45)
BASOPHILS # BLD AUTO: 0.06 THOUSANDS/ΜL (ref 0–0.1)
BASOPHILS NFR BLD AUTO: 1 % (ref 0–1)
BILIRUB SERPL-MCNC: 1.04 MG/DL (ref 0.2–1)
BUN SERPL-MCNC: 25 MG/DL (ref 5–25)
CALCIUM SERPL-MCNC: 10 MG/DL (ref 8.3–10.1)
CHLORIDE SERPL-SCNC: 106 MMOL/L (ref 100–108)
CO2 SERPL-SCNC: 30 MMOL/L (ref 21–32)
CREAT SERPL-MCNC: 0.78 MG/DL (ref 0.6–1.3)
EOSINOPHIL # BLD AUTO: 0.11 THOUSAND/ΜL (ref 0–0.61)
EOSINOPHIL NFR BLD AUTO: 2 % (ref 0–6)
ERYTHROCYTE [DISTWIDTH] IN BLOOD BY AUTOMATED COUNT: 14.6 % (ref 11.6–15.1)
GFR SERPL CREATININE-BSD FRML MDRD: 71 ML/MIN/1.73SQ M
GLUCOSE SERPL-MCNC: 103 MG/DL (ref 65–140)
HCT VFR BLD AUTO: 39 % (ref 34.8–46.1)
HGB BLD-MCNC: 12.8 G/DL (ref 11.5–15.4)
IMM GRANULOCYTES # BLD AUTO: 0.03 THOUSAND/UL (ref 0–0.2)
IMM GRANULOCYTES NFR BLD AUTO: 1 % (ref 0–2)
LYMPHOCYTES # BLD AUTO: 1.4 THOUSANDS/ΜL (ref 0.6–4.47)
LYMPHOCYTES NFR BLD AUTO: 22 % (ref 14–44)
MCH RBC QN AUTO: 33.4 PG (ref 26.8–34.3)
MCHC RBC AUTO-ENTMCNC: 32.8 G/DL (ref 31.4–37.4)
MCV RBC AUTO: 102 FL (ref 82–98)
MONOCYTES # BLD AUTO: 0.62 THOUSAND/ΜL (ref 0.17–1.22)
MONOCYTES NFR BLD AUTO: 10 % (ref 4–12)
NEUTROPHILS # BLD AUTO: 4.16 THOUSANDS/ΜL (ref 1.85–7.62)
NEUTS SEG NFR BLD AUTO: 64 % (ref 43–75)
NRBC BLD AUTO-RTO: 0 /100 WBCS
PLATELET # BLD AUTO: 185 THOUSANDS/UL (ref 149–390)
PMV BLD AUTO: 11.2 FL (ref 8.9–12.7)
POTASSIUM SERPL-SCNC: 4.5 MMOL/L (ref 3.5–5.3)
PROT SERPL-MCNC: 7.5 G/DL (ref 6.4–8.2)
RBC # BLD AUTO: 3.83 MILLION/UL (ref 3.81–5.12)
SODIUM SERPL-SCNC: 140 MMOL/L (ref 136–145)
WBC # BLD AUTO: 6.38 THOUSAND/UL (ref 4.31–10.16)

## 2021-11-18 PROCEDURE — 85025 COMPLETE CBC W/AUTO DIFF WBC: CPT

## 2021-11-18 PROCEDURE — 80053 COMPREHEN METABOLIC PANEL: CPT

## 2021-11-18 PROCEDURE — 36415 COLL VENOUS BLD VENIPUNCTURE: CPT

## 2021-11-18 PROCEDURE — 99213 OFFICE O/P EST LOW 20 MIN: CPT | Performed by: FAMILY MEDICINE

## 2022-01-21 ENCOUNTER — OFFICE VISIT (OUTPATIENT)
Dept: WOUND CARE | Facility: CLINIC | Age: 82
End: 2022-01-21
Payer: MEDICARE

## 2022-01-21 VITALS
DIASTOLIC BLOOD PRESSURE: 57 MMHG | BODY MASS INDEX: 19.7 KG/M2 | WEIGHT: 133 LBS | HEIGHT: 69 IN | SYSTOLIC BLOOD PRESSURE: 115 MMHG | HEART RATE: 71 BPM | TEMPERATURE: 97 F

## 2022-01-21 DIAGNOSIS — L97.919 CHRONIC VENOUS HYPERTENSION (IDIOPATHIC) WITH ULCER AND INFLAMMATION OF RIGHT LOWER EXTREMITY (HCC): Primary | ICD-10-CM

## 2022-01-21 DIAGNOSIS — I87.331 CHRONIC VENOUS HYPERTENSION (IDIOPATHIC) WITH ULCER AND INFLAMMATION OF RIGHT LOWER EXTREMITY (HCC): Primary | ICD-10-CM

## 2022-01-21 PROCEDURE — 99213 OFFICE O/P EST LOW 20 MIN: CPT | Performed by: ORTHOPAEDIC SURGERY

## 2022-01-21 PROCEDURE — 29581 APPL MULTLAYER CMPRN SYS LEG: CPT

## 2022-01-21 NOTE — PATIENT INSTRUCTIONS
Orders Placed This Encounter   Procedures    Wound cleansing and dressings     Right leg  Wash your hands with soap and water  Remove old dressing, discard into plastic bag and place in trash  Cleanse the wound with normal saline prior to applying a clean dressing  Do not use tissue or cotton balls  Do not scrub the wound  Pat dry using gauze  Shower only with cast cover  Keep dressing dry  Apply Betamethasone to excoriated periwound  Apply opticell ag to the wound  Cover with coflex     Change dressing weekly and as needed    Increase protein intake to promote wound healing     Standing Status:   Future     Standing Expiration Date:   1/21/2023    Wound compression and edema control     Coflex Right leg    Keep compression wrap/wraps clean and dry  If wraps are too tight and you experience numbness/tingling, call the wound center  If after hours, remove wraps or proceed to nearest E R  and call wound center in AM     Beth Miller will be changed weekly    Avoid prolonged standing in one place  Elevate leg(s) above the level of the heart when sitting or as much as possible       Standing Status:   Future     Standing Expiration Date:   1/21/2023

## 2022-01-21 NOTE — PROGRESS NOTES
Patient ID: Amena Christiansen is a 78 y o  female Date of Birth 1940       Chief Complaint   Patient presents with    New Patient Visit     Right leg       Allergies:  Patient has no known allergies  Diagnosis:   Diagnosis ICD-10-CM Associated Orders   1  Chronic venous hypertension (idiopathic) with ulcer and inflammation of right lower extremity (HCC)  I87 331 Wound cleansing and dressings    L97 919 Wound compression and edema control     Cast Application        Assessment and Plan :   RLE venous ulcer  No signs of infection today   Wound management with Betamethasone to periwound, Opticell Ag to wound bed, see wound orders below    Compression with Coflex TLC   Counseled on importance of frequent elevation of leg and increase exercise/walking   Counseled on adequate protein intake, 3-4 servings per day   Followup in 1 week or call sooner with questions or concerns    Subjective:   Presents for initial eval of  RLE nonhealing surgical wound which has been present since early December   Pt has been applying adaptic and triple antibiotic to wound bed  Pt states she has intermittent moderate drainage  Pt states she wears her compression stockings daily  Does not have an odor  No fever  No diabetes   No smoking          The following portions of the patient's history were reviewed and updated as appropriate:   Patient Active Problem List   Diagnosis    Chronic venous hypertension (idiopathic) with ulcer of right lower extremity ([address] )    Patient is Mandaeism    Mild protein-calorie malnutrition [address] [address] )      Past Medical History:   Diagnosis Date    Dementia ([address] )     Memory loss 2018     Past Surgical History:   Procedure Laterality Date    ANKLE FRACTURE SURGERY       Family History   Problem Relation Age of Onset    Dementia Sister         Suri's sister is     Cancer Father      Social History     Socioeconomic History    Marital status:       Spouse name: None    Number of children: None    Years of education: None    Highest education level: None   Occupational History    None   Tobacco Use    Smoking status: Never Smoker    Smokeless tobacco: Never Used   Substance and Sexual Activity    Alcohol use: Yes    Drug use: No    Sexual activity: Not Currently     Partners: Male     Birth control/protection: None   Other Topics Concern    None   Social History Narrative    Lives alone      Social Determinants of Health     Financial Resource Strain: Not on file   Food Insecurity: Not on file   Transportation Needs: Not on file   Physical Activity: Not on file   Stress: Not on file   Social Connections: Not on file   Intimate Partner Violence: Not on file   Housing Stability: Not on file     No current outpatient medications on file  Review of Systems   Constitutional: Negative for appetite change, chills, fatigue, fever and unexpected weight change  HENT: Negative for congestion, hearing loss and postnasal drip  Respiratory: Negative for cough and shortness of breath  Cardiovascular: Positive for leg swelling  Skin: Positive for wound (RLE)  Negative for rash  Neurological: Negative for numbness  Hematological: Does not bruise/bleed easily  Psychiatric/Behavioral: Negative  Objective:  /57   Pulse 71   Temp (!) 97 °F (36 1 °C)   Ht 5' 9" (1 753 m)   Wt 60 3 kg (133 lb)   BMI 19 64 kg/m²   Pain Score: 0-No pain     Physical Exam  Vitals reviewed  Constitutional:       General: She is not in acute distress  Appearance: Normal appearance  She is well-developed and normal weight  HENT:      Head: Normocephalic and atraumatic  Cardiovascular:      Rate and Rhythm: Normal rate  Pulmonary:      Effort: Pulmonary effort is normal    Musculoskeletal:         General: No deformity  Right lower leg: No edema  Left lower leg: No edema  Skin:     General: Skin is warm and dry        Findings: Wound (RLE venous ulcer) present  Comments: +Exudate, +erythema  See wound assessment   Neurological:      General: No focal deficit present  Mental Status: She is alert and oriented to person, place, and time  Gait: Gait normal    Psychiatric:         Mood and Affect: Mood and affect normal          Behavior: Behavior normal  Behavior is cooperative  Wound 01/21/22 Venous Ulcer Leg Right;Medial (Active)   Wound Image Images linked 01/21/22 0854   Wound Description Fragile;Pink 01/21/22 0853   Vandana-wound Assessment Excoriated;Pink;Hyperpigmented 01/21/22 0853   Wound Length (cm) 8 5 cm 01/21/22 0853   Wound Width (cm) 7 cm 01/21/22 0853   Wound Depth (cm) 0 1 cm 01/21/22 0853   Wound Surface Area (cm^2) 59 5 cm^2 01/21/22 0853   Wound Volume (cm^3) 5 95 cm^3 01/21/22 0853   Calculated Wound Volume (cm^3) 5 95 cm^3 01/21/22 0853   Drainage Amount Moderate 01/21/22 0853   Drainage Description Serosanguineous 01/21/22 0853   Non-staged Wound Description Full thickness 01/21/22 0853       Wound 01/21/22 Venous Ulcer Pretibial Proximal;Right (Active)   Wound Image Images linked 01/21/22 0857   Wound Description Fragile;Pink 01/21/22 0857   Vandana-wound Assessment Pink;Erythema; Hyperpigmented 01/21/22 0857   Wound Length (cm) 0 7 cm 01/21/22 0857   Wound Width (cm) 0 4 cm 01/21/22 0857   Wound Depth (cm) 0 1 cm 01/21/22 0857   Wound Surface Area (cm^2) 0 28 cm^2 01/21/22 0857   Wound Volume (cm^3) 0 028 cm^3 01/21/22 0857   Calculated Wound Volume (cm^3) 0 03 cm^3 01/21/22 0857   Drainage Amount Small 01/21/22 0857   Drainage Description Serosanguineous 01/21/22 0857   Non-staged Wound Description Partial thickness 01/21/22 0857         Wound Instructions:  Orders Placed This Encounter   Procedures    Wound cleansing and dressings     Right leg  Wash your hands with soap and water  Remove old dressing, discard into plastic bag and place in trash    Cleanse the wound with normal saline prior to applying a clean dressing  Do not use tissue or cotton balls  Do not scrub the wound  Pat dry using gauze  Shower only with cast cover  Keep dressing dry  Apply Betamethasone to excoriated periwound  Apply opticell ag to the wound  Cover with coflex     Change dressing weekly and as needed    Increase protein intake to promote wound healing     Standing Status:   Future     Standing Expiration Date:   1/21/2023    Wound compression and edema control     Coflex Right leg    Keep compression wrap/wraps clean and dry  If wraps are too tight and you experience numbness/tingling, call the wound center  If after hours, remove wraps or proceed to nearest E R  and call wound center in [facility] Nam will be changed weekly    Avoid prolonged standing in one place  Elevate leg(s) above the level of the heart when sitting or as much as possible  Standing Status:   Future     Standing Expiration Date:   1/60/7149    Cast Application     This order was created via procedure documentation       Taina Okeefe PA-C, [facility]      Portions of the record may have been created with voice recognition software  Occasional wrong word or "sound alike" substitutions may have occurred due to the inherent limitations of voice recognition software  Read the chart carefully and recognize, using context, where substitutions have occurred

## 2022-01-21 NOTE — PROGRESS NOTES
Cast Application    Date/Time: 1/21/2022 9:26 AM  Performed by: Rene Heller  Authorized by: Keanu Tripathi PA-C   Universal Protocol:  Consent: Verbal consent obtained  Consent given by: patient  Patient understanding: patient states understanding of the procedure being performed  Patient consent: the patient's understanding of the procedure matches consent given  Procedure consent: procedure consent matches procedure scheduled  Patient identity confirmed: verbally with patient      Pre-procedure details:     Sensation:  Normal  Procedure details:     Laterality:  Right    Location:  Leg    Leg:  R lower leg    Strapping: no  Cast type:  Multi-layer compression short leg    Post-procedure details:     Pain:  Improved    Sensation:  Normal    Patient tolerance of procedure: Tolerated well, no immediate complications  Comments:      Multilayer wrap procedure     Before application, DANIA and/or TBI determined to be adequate for healing and application of compression  Lower extremity washed prior to application of compression wrap  With the foot in dorsiflexed position, coflex was applied as per physician orders without complications or complaint of pain  The procedure was tolerated well  Toes warm & pink post application  Patient provided education & reinforced to observe toes for any discoloration, swelling or tingling and instructed when to report to the [address] or to remove compression  Wound care as per providers orders, patient tolerated well

## 2022-01-27 ENCOUNTER — OFFICE VISIT (OUTPATIENT)
Dept: WOUND CARE | Facility: CLINIC | Age: 82
End: 2022-01-27
Payer: MEDICARE

## 2022-01-27 VITALS
DIASTOLIC BLOOD PRESSURE: 78 MMHG | RESPIRATION RATE: 18 BRPM | HEART RATE: 84 BPM | TEMPERATURE: 97.4 F | SYSTOLIC BLOOD PRESSURE: 124 MMHG

## 2022-01-27 DIAGNOSIS — L97.919 CHRONIC VENOUS HYPERTENSION (IDIOPATHIC) WITH ULCER AND INFLAMMATION OF RIGHT LOWER EXTREMITY (HCC): Primary | ICD-10-CM

## 2022-01-27 DIAGNOSIS — I87.331 CHRONIC VENOUS HYPERTENSION (IDIOPATHIC) WITH ULCER AND INFLAMMATION OF RIGHT LOWER EXTREMITY (HCC): Primary | ICD-10-CM

## 2022-01-27 PROCEDURE — 99212 OFFICE O/P EST SF 10 MIN: CPT | Performed by: ORTHOPAEDIC SURGERY

## 2022-01-27 PROCEDURE — 99213 OFFICE O/P EST LOW 20 MIN: CPT | Performed by: ORTHOPAEDIC SURGERY

## 2022-01-27 RX ORDER — LIDOCAINE HYDROCHLORIDE 40 MG/ML
5 SOLUTION TOPICAL ONCE
Status: COMPLETED | OUTPATIENT
Start: 2022-01-27 | End: 2022-01-27

## 2022-01-27 RX ADMIN — LIDOCAINE HYDROCHLORIDE 5 ML: 40 SOLUTION TOPICAL at 08:48

## 2022-01-27 NOTE — PROGRESS NOTES
Patient ID: Alejandrina Monk is a 78 y o  female Date of Birth 1940       Chief Complaint   Patient presents with    Follow Up Wound Care Visit     right leg ulcer       Allergies:  Patient has no known allergies  Diagnosis:   Diagnosis ICD-10-CM Associated Orders   1  Chronic venous hypertension (idiopathic) with ulcer and inflammation of right lower extremity ([facility])  I87 331 lidocaine (XYLOCAINE) 4 % topical solution 5 mL    L97 919 Wound cleansing and dressings        Assessment and Plan :  RLE Venous ulcer is completely epithelialized  Moisturize skin daily with skin nourishing cream  Discussed the importance of long-term edema control and use of long-term compression   Counseled on importance of frequent elevation of leg and increase exercise/walking  Follow up as needed or call with questions or concerns    Subjective:   Patient presents for followup of RLE venous ulcer   No new complaints  Has been using Opticel Ag and Coflexor compression    Denies fevers or chills  The following portions of the patient's history were reviewed and updated as appropriate:   Patient Active Problem List   Diagnosis    Chronic venous hypertension (idiopathic) with ulcer of right lower extremity ([facility] [address] )    Patient is Taoism    Mild protein-calorie malnutrition ([facility] [address] )      Past Medical History:   Diagnosis Date    Dementia ([facility] [address] [facility] )     Memory loss 2018     Past Surgical History:   Procedure Laterality Date    ANKLE FRACTURE SURGERY       Family History   Problem Relation Age of Onset    Dementia Sister         Suri's sister is     Cancer Father      Social History     Socioeconomic History    Marital status:       Spouse name: None    Number of children: None    Years of education: None    Highest education level: None   Occupational History    None   Tobacco Use    Smoking status: Never Smoker    Smokeless tobacco: Never Used   Substance and Sexual Activity    Alcohol use: Yes    Drug use: No    Sexual activity: Not Currently     Partners: Male     Birth control/protection: None   Other Topics Concern    None   Social History Narrative    Lives alone      Social Determinants of Health     Financial Resource Strain: Not on file   Food Insecurity: Not on file   Transportation Needs: Not on file   Physical Activity: Not on file   Stress: Not on file   Social Connections: Not on file   Intimate Partner Violence: Not on file   Housing Stability: Not on file     No current outpatient medications on file  No current facility-administered medications for this visit  Review of Systems   Constitutional: Negative for appetite change, chills, fatigue, fever and unexpected weight change  HENT: Negative for congestion, hearing loss and postnasal drip  Respiratory: Negative for cough and shortness of breath  Cardiovascular: Positive for leg swelling  Skin: Positive for wound (RLE)  Negative for rash  Neurological: Negative for numbness  Hematological: Does not bruise/bleed easily  Psychiatric/Behavioral: Negative  Objective:  /78   Pulse 84   Temp (!) 97 4 °F (36 3 °C)   Resp 18   Pain Score: 0-No pain     Physical Exam  Vitals reviewed  Constitutional:       General: She is not in acute distress  Appearance: Normal appearance  She is well-developed and normal weight  HENT:      Head: Normocephalic and atraumatic  Cardiovascular:      Rate and Rhythm: Normal rate  Pulmonary:      Effort: Pulmonary effort is normal    Musculoskeletal:         General: No deformity  Right lower leg: No edema  Left lower leg: No edema  Skin:     General: Skin is warm and dry  Findings: Wound (RLE venous ulcer) present  Comments: Healed, See wound assessment   Neurological:      General: No focal deficit present  Mental Status: She is alert and oriented to person, place, and time        Gait: Gait normal    Psychiatric:         Mood and Affect: Mood and affect normal          Behavior: Behavior normal  Behavior is cooperative                   Wound 01/21/22 Venous Ulcer Leg Right;Medial (Active)   Wound Image Images linked 01/27/22 0842   Wound Description Fragile;Pink 01/27/22 0844   Vandana-wound Assessment Hyperpigmented 01/27/22 0844   Wound Length (cm) 0 cm 01/27/22 0844   Wound Width (cm) 0 cm 01/27/22 0844   Wound Depth (cm) 0 cm 01/27/22 0844   Wound Surface Area (cm^2) 0 cm^2 01/27/22 0844   Wound Volume (cm^3) 0 cm^3 01/27/22 0844   Calculated Wound Volume (cm^3) 0 cm^3 01/27/22 0844   Change in Wound Size % 100 01/27/22 0844   Drainage Amount None 01/27/22 0844   Non-staged Wound Description Full thickness 01/27/22 0844   Dressing Status Intact 01/27/22 0844       Wound 01/21/22 Venous Ulcer Pretibial Proximal;Right (Active)   Wound Image Images linked 01/27/22 0842   Wound Description Fragile;Pink 01/27/22 0845   Vandana-wound Assessment Hyperpigmented 01/27/22 0845   Wound Length (cm) 0 5 cm 01/27/22 0845   Wound Width (cm) 0 4 cm 01/27/22 0845   Wound Depth (cm) 0 cm 01/27/22 0845   Wound Surface Area (cm^2) 0 2 cm^2 01/27/22 0845   Wound Volume (cm^3) 0 cm^3 01/27/22 0845   Calculated Wound Volume (cm^3) 0 cm^3 01/27/22 0845   Change in Wound Size % 100 01/27/22 0845   Drainage Amount None 01/27/22 0845   Non-staged Wound Description Full thickness 01/27/22 0845   Dressing Status Intact 01/27/22 0845       Wound Instructions:  Orders Placed This Encounter   Procedures    Wound cleansing and dressings      Wound cleansing and dressings      Right leg    Today at wound center Applied Betamethasone to right leg       Wound is healed today moisturize leg daily, (MD recommending to use Hydragaurd Silicone Cream) and wear you compression stockings    If wound reopens or worsens please return to wound center asap          Standing Status:   Future     Standing Expiration Date:   1/27/2023       Heidi Lundberg PA-C, [facility]      Portions of the record may have been created with voice recognition software  Occasional wrong word or "sound alike" substitutions may have occurred due to the inherent limitations of voice recognition software  Read the chart carefully and recognize, using context, where substitutions have occurred

## 2022-01-27 NOTE — PATIENT INSTRUCTIONS
Orders Placed This Encounter   Procedures    Wound cleansing and dressings      Wound cleansing and dressings      Right leg    Today at wound center Applied Betamethasone to right leg       Wound is healed today moisturize leg daily, (MD recommending to use Hydragaurd Silicone Cream) and wear you compression stockings    If wound reopens or worsens please return to wound center asap          Standing Status:   Future     Standing Expiration Date:   1/27/2023

## 2022-06-21 ENCOUNTER — HOSPITAL ENCOUNTER (INPATIENT)
Facility: HOSPITAL | Age: 82
LOS: 6 days | Discharge: HOME HEALTH | DRG: 603 | End: 2022-06-27
Attending: EMERGENCY MEDICINE | Admitting: INTERNAL MEDICINE
Payer: MEDICARE

## 2022-06-21 ENCOUNTER — APPOINTMENT (EMERGENCY)
Dept: RADIOLOGY | Facility: HOSPITAL | Age: 82
DRG: 603 | End: 2022-06-21
Payer: MEDICARE

## 2022-06-21 DIAGNOSIS — L25.9 CONTACT DERMATITIS: ICD-10-CM

## 2022-06-21 DIAGNOSIS — L03.115 CELLULITIS OF RIGHT LEG: Primary | ICD-10-CM

## 2022-06-21 LAB
2HR DELTA HS TROPONIN: 1 NG/L
ALBUMIN SERPL BCP-MCNC: 3.8 G/DL (ref 3.5–5)
ALP SERPL-CCNC: 110 U/L (ref 46–116)
ALT SERPL W P-5'-P-CCNC: 22 U/L (ref 12–78)
ANION GAP SERPL CALCULATED.3IONS-SCNC: 8 MMOL/L (ref 4–13)
APTT PPP: 32 SECONDS (ref 23–37)
AST SERPL W P-5'-P-CCNC: 24 U/L (ref 5–45)
BASOPHILS # BLD AUTO: 0.04 THOUSANDS/ΜL (ref 0–0.1)
BASOPHILS NFR BLD AUTO: 1 % (ref 0–1)
BILIRUB SERPL-MCNC: 0.95 MG/DL (ref 0.2–1)
BILIRUB UR QL STRIP: NEGATIVE
BUN SERPL-MCNC: 21 MG/DL (ref 5–25)
CALCIUM SERPL-MCNC: 9.4 MG/DL (ref 8.3–10.1)
CARDIAC TROPONIN I PNL SERPL HS: 7 NG/L
CARDIAC TROPONIN I PNL SERPL HS: 8 NG/L
CHLORIDE SERPL-SCNC: 107 MMOL/L (ref 100–108)
CLARITY UR: CLEAR
CO2 SERPL-SCNC: 27 MMOL/L (ref 21–32)
COLOR UR: COLORLESS
CREAT SERPL-MCNC: 0.69 MG/DL (ref 0.6–1.3)
EOSINOPHIL # BLD AUTO: 0.21 THOUSAND/ΜL (ref 0–0.61)
EOSINOPHIL NFR BLD AUTO: 2 % (ref 0–6)
ERYTHROCYTE [DISTWIDTH] IN BLOOD BY AUTOMATED COUNT: 14.2 % (ref 11.6–15.1)
GFR SERPL CREATININE-BSD FRML MDRD: 80 ML/MIN/1.73SQ M
GLUCOSE SERPL-MCNC: 121 MG/DL (ref 65–140)
GLUCOSE UR STRIP-MCNC: NEGATIVE MG/DL
HCT VFR BLD AUTO: 40.7 % (ref 34.8–46.1)
HGB BLD-MCNC: 13.6 G/DL (ref 11.5–15.4)
HGB UR QL STRIP.AUTO: NEGATIVE
IMM GRANULOCYTES # BLD AUTO: 0.02 THOUSAND/UL (ref 0–0.2)
IMM GRANULOCYTES NFR BLD AUTO: 0 % (ref 0–2)
INR PPP: 1.01 (ref 0.84–1.19)
KETONES UR STRIP-MCNC: NEGATIVE MG/DL
LACTATE SERPL-SCNC: 0.9 MMOL/L (ref 0.5–2)
LEUKOCYTE ESTERASE UR QL STRIP: NEGATIVE
LYMPHOCYTES # BLD AUTO: 0.76 THOUSANDS/ΜL (ref 0.6–4.47)
LYMPHOCYTES NFR BLD AUTO: 9 % (ref 14–44)
MCH RBC QN AUTO: 34.3 PG (ref 26.8–34.3)
MCHC RBC AUTO-ENTMCNC: 33.4 G/DL (ref 31.4–37.4)
MCV RBC AUTO: 103 FL (ref 82–98)
MONOCYTES # BLD AUTO: 0.8 THOUSAND/ΜL (ref 0.17–1.22)
MONOCYTES NFR BLD AUTO: 9 % (ref 4–12)
NEUTROPHILS # BLD AUTO: 6.81 THOUSANDS/ΜL (ref 1.85–7.62)
NEUTS SEG NFR BLD AUTO: 79 % (ref 43–75)
NITRITE UR QL STRIP: NEGATIVE
NRBC BLD AUTO-RTO: 0 /100 WBCS
PH UR STRIP.AUTO: 6 [PH]
PLATELET # BLD AUTO: 163 THOUSANDS/UL (ref 149–390)
PMV BLD AUTO: 10.8 FL (ref 8.9–12.7)
POTASSIUM SERPL-SCNC: 3.8 MMOL/L (ref 3.5–5.3)
PROCALCITONIN SERPL-MCNC: <0.05 NG/ML
PROT SERPL-MCNC: 7.2 G/DL (ref 6.4–8.2)
PROT UR STRIP-MCNC: NEGATIVE MG/DL
PROTHROMBIN TIME: 12.9 SECONDS (ref 11.6–14.5)
RBC # BLD AUTO: 3.96 MILLION/UL (ref 3.81–5.12)
SODIUM SERPL-SCNC: 142 MMOL/L (ref 136–145)
SP GR UR STRIP.AUTO: <=1.005 (ref 1–1.03)
UROBILINOGEN UR QL STRIP.AUTO: 0.2 E.U./DL
WBC # BLD AUTO: 8.64 THOUSAND/UL (ref 4.31–10.16)

## 2022-06-21 PROCEDURE — 96361 HYDRATE IV INFUSION ADD-ON: CPT

## 2022-06-21 PROCEDURE — 96365 THER/PROPH/DIAG IV INF INIT: CPT

## 2022-06-21 PROCEDURE — 87040 BLOOD CULTURE FOR BACTERIA: CPT | Performed by: EMERGENCY MEDICINE

## 2022-06-21 PROCEDURE — 93005 ELECTROCARDIOGRAM TRACING: CPT

## 2022-06-21 PROCEDURE — 85610 PROTHROMBIN TIME: CPT | Performed by: EMERGENCY MEDICINE

## 2022-06-21 PROCEDURE — 84145 PROCALCITONIN (PCT): CPT | Performed by: EMERGENCY MEDICINE

## 2022-06-21 PROCEDURE — 99285 EMERGENCY DEPT VISIT HI MDM: CPT | Performed by: EMERGENCY MEDICINE

## 2022-06-21 PROCEDURE — 83605 ASSAY OF LACTIC ACID: CPT | Performed by: EMERGENCY MEDICINE

## 2022-06-21 PROCEDURE — 99284 EMERGENCY DEPT VISIT MOD MDM: CPT

## 2022-06-21 PROCEDURE — 84484 ASSAY OF TROPONIN QUANT: CPT | Performed by: EMERGENCY MEDICINE

## 2022-06-21 PROCEDURE — 73590 X-RAY EXAM OF LOWER LEG: CPT

## 2022-06-21 PROCEDURE — 81003 URINALYSIS AUTO W/O SCOPE: CPT

## 2022-06-21 PROCEDURE — 36415 COLL VENOUS BLD VENIPUNCTURE: CPT | Performed by: EMERGENCY MEDICINE

## 2022-06-21 PROCEDURE — 99223 1ST HOSP IP/OBS HIGH 75: CPT

## 2022-06-21 PROCEDURE — 80053 COMPREHEN METABOLIC PANEL: CPT | Performed by: EMERGENCY MEDICINE

## 2022-06-21 PROCEDURE — 84484 ASSAY OF TROPONIN QUANT: CPT

## 2022-06-21 PROCEDURE — 85025 COMPLETE CBC W/AUTO DIFF WBC: CPT | Performed by: EMERGENCY MEDICINE

## 2022-06-21 PROCEDURE — 85730 THROMBOPLASTIN TIME PARTIAL: CPT | Performed by: EMERGENCY MEDICINE

## 2022-06-21 RX ORDER — PREDNISONE 20 MG/1
60 TABLET ORAL ONCE
Status: COMPLETED | OUTPATIENT
Start: 2022-06-21 | End: 2022-06-21

## 2022-06-21 RX ORDER — LORATADINE 10 MG/1
10 TABLET ORAL DAILY
Status: DISCONTINUED | OUTPATIENT
Start: 2022-06-21 | End: 2022-06-27 | Stop reason: HOSPADM

## 2022-06-21 RX ORDER — ENOXAPARIN SODIUM 100 MG/ML
40 INJECTION SUBCUTANEOUS DAILY
Status: DISCONTINUED | OUTPATIENT
Start: 2022-06-22 | End: 2022-06-27 | Stop reason: HOSPADM

## 2022-06-21 RX ORDER — ACETAMINOPHEN 325 MG/1
650 TABLET ORAL EVERY 6 HOURS PRN
Status: DISCONTINUED | OUTPATIENT
Start: 2022-06-21 | End: 2022-06-27 | Stop reason: HOSPADM

## 2022-06-21 RX ORDER — FAMOTIDINE 20 MG/1
20 TABLET, FILM COATED ORAL 2 TIMES DAILY
Status: DISCONTINUED | OUTPATIENT
Start: 2022-06-21 | End: 2022-06-26

## 2022-06-21 RX ORDER — DIPHENHYDRAMINE HYDROCHLORIDE 50 MG/ML
25 INJECTION INTRAMUSCULAR; INTRAVENOUS EVERY 6 HOURS PRN
Status: DISCONTINUED | OUTPATIENT
Start: 2022-06-21 | End: 2022-06-27 | Stop reason: HOSPADM

## 2022-06-21 RX ADMIN — SODIUM CHLORIDE 1000 ML: 0.9 INJECTION, SOLUTION INTRAVENOUS at 18:47

## 2022-06-21 RX ADMIN — PREDNISONE 60 MG: 20 TABLET ORAL at 18:53

## 2022-06-21 RX ADMIN — FAMOTIDINE 20 MG: 20 TABLET ORAL at 23:30

## 2022-06-21 RX ADMIN — DIPHENHYDRAMINE HYDROCHLORIDE 25 MG: 50 INJECTION, SOLUTION INTRAMUSCULAR; INTRAVENOUS at 23:51

## 2022-06-21 RX ADMIN — LORATADINE 10 MG: 10 TABLET ORAL at 23:30

## 2022-06-21 RX ADMIN — CEFTRIAXONE 2000 MG: 2 INJECTION, POWDER, FOR SOLUTION INTRAMUSCULAR; INTRAVENOUS at 18:45

## 2022-06-22 LAB
4HR DELTA HS TROPONIN: 0 NG/L
ANION GAP SERPL CALCULATED.3IONS-SCNC: 8 MMOL/L (ref 4–13)
BASOPHILS # BLD AUTO: 0.02 THOUSANDS/ΜL (ref 0–0.1)
BASOPHILS NFR BLD AUTO: 1 % (ref 0–1)
BUN SERPL-MCNC: 16 MG/DL (ref 5–25)
CALCIUM SERPL-MCNC: 9.6 MG/DL (ref 8.3–10.1)
CARDIAC TROPONIN I PNL SERPL HS: 7 NG/L
CHLORIDE SERPL-SCNC: 109 MMOL/L (ref 100–108)
CO2 SERPL-SCNC: 27 MMOL/L (ref 21–32)
CREAT SERPL-MCNC: 0.69 MG/DL (ref 0.6–1.3)
EOSINOPHIL # BLD AUTO: 0.01 THOUSAND/ΜL (ref 0–0.61)
EOSINOPHIL NFR BLD AUTO: 0 % (ref 0–6)
ERYTHROCYTE [DISTWIDTH] IN BLOOD BY AUTOMATED COUNT: 14.2 % (ref 11.6–15.1)
GFR SERPL CREATININE-BSD FRML MDRD: 80 ML/MIN/1.73SQ M
GLUCOSE SERPL-MCNC: 147 MG/DL (ref 65–140)
HCT VFR BLD AUTO: 40.2 % (ref 34.8–46.1)
HGB BLD-MCNC: 13.5 G/DL (ref 11.5–15.4)
IMM GRANULOCYTES # BLD AUTO: 0.02 THOUSAND/UL (ref 0–0.2)
IMM GRANULOCYTES NFR BLD AUTO: 1 % (ref 0–2)
LYMPHOCYTES # BLD AUTO: 0.68 THOUSANDS/ΜL (ref 0.6–4.47)
LYMPHOCYTES NFR BLD AUTO: 16 % (ref 14–44)
MCH RBC QN AUTO: 34.6 PG (ref 26.8–34.3)
MCHC RBC AUTO-ENTMCNC: 33.6 G/DL (ref 31.4–37.4)
MCV RBC AUTO: 103 FL (ref 82–98)
MONOCYTES # BLD AUTO: 0.22 THOUSAND/ΜL (ref 0.17–1.22)
MONOCYTES NFR BLD AUTO: 5 % (ref 4–12)
NEUTROPHILS # BLD AUTO: 3.39 THOUSANDS/ΜL (ref 1.85–7.62)
NEUTS SEG NFR BLD AUTO: 77 % (ref 43–75)
NRBC BLD AUTO-RTO: 0 /100 WBCS
PLATELET # BLD AUTO: 162 THOUSANDS/UL (ref 149–390)
PMV BLD AUTO: 11 FL (ref 8.9–12.7)
POTASSIUM SERPL-SCNC: 4 MMOL/L (ref 3.5–5.3)
RBC # BLD AUTO: 3.9 MILLION/UL (ref 3.81–5.12)
SODIUM SERPL-SCNC: 144 MMOL/L (ref 136–145)
WBC # BLD AUTO: 4.34 THOUSAND/UL (ref 4.31–10.16)

## 2022-06-22 PROCEDURE — 85025 COMPLETE CBC W/AUTO DIFF WBC: CPT

## 2022-06-22 PROCEDURE — 99232 SBSQ HOSP IP/OBS MODERATE 35: CPT | Performed by: HOSPITALIST

## 2022-06-22 PROCEDURE — 80048 BASIC METABOLIC PNL TOTAL CA: CPT

## 2022-06-22 RX ORDER — HYDROXYZINE HYDROCHLORIDE 25 MG/1
50 TABLET, FILM COATED ORAL ONCE
Status: COMPLETED | OUTPATIENT
Start: 2022-06-22 | End: 2022-06-22

## 2022-06-22 RX ORDER — OLANZAPINE 10 MG/1
2.5 INJECTION, POWDER, LYOPHILIZED, FOR SOLUTION INTRAMUSCULAR ONCE
Status: COMPLETED | OUTPATIENT
Start: 2022-06-23 | End: 2022-06-22

## 2022-06-22 RX ADMIN — OLANZAPINE 2.5 MG: 10 INJECTION, POWDER, FOR SOLUTION INTRAMUSCULAR at 23:57

## 2022-06-22 RX ADMIN — CEFTRIAXONE SODIUM 1000 MG: 10 INJECTION, POWDER, FOR SOLUTION INTRAVENOUS at 17:17

## 2022-06-22 RX ADMIN — FAMOTIDINE 20 MG: 20 TABLET ORAL at 17:17

## 2022-06-22 RX ADMIN — PREDNISONE 50 MG: 20 TABLET ORAL at 08:57

## 2022-06-22 RX ADMIN — FAMOTIDINE 20 MG: 20 TABLET ORAL at 08:58

## 2022-06-22 RX ADMIN — LORATADINE 10 MG: 10 TABLET ORAL at 08:58

## 2022-06-22 RX ADMIN — HYDROXYZINE HYDROCHLORIDE 50 MG: 25 TABLET ORAL at 01:08

## 2022-06-22 NOTE — H&P
[address] [facility]  H&P- Lou Salt 1940, 78 y o  female MRN: [id number]  Unit/Bed#: -[facility] Encounter: [id number]  Primary Care Provider: Yesy Mccord DO   Date and time admitted to hospital: 6/21/2022  5:16 PM    * Cellulitis of right lower extremity  Assessment & Plan  Patient with history of dementia, history obtained from daughters in room  Reports chronic RLE wound and redness and follows with wound care as an outpatient  She has received several courses of p o  Antibiotics over the years (has been several years since last), and family has been utilizing anti fungal cream is treatment as of late  Over the past few days the right lower extremity has had worsening redness and began to have clear fluid drainage  They also report on/off itchy rash along the patient's torso, back, and left lower extremity  They reported been told that it is contact dermatitis and have used steroid cream as an outpatient      /58   Pulse 64   Temp 98 1 °F (36 7 °C) (Oral)   Resp 16   SpO2 98%     · Reports chronic RLE wound and redness  · Follows with outpatient wound care and has received several PO abx   · Family has been utilizing antifungal cream as of late (reports some transient improvement)  · Reporting worsening redness, clear fluid drainage, and chills over the past few days   · Large erythematous rash on the anterior right shin, with smaller erythematous patch on the right calf  · Appears wet, with no apparent wound   · There is minimal tenderness without underlying fluctuation felt   · Does not meet SEPSIS criteria on admission  · Afebrile; no leukocytosis; lactic acid WNL  · XR RLE pending official read  · ED gave IV rocephin  · MRSA swab ordered  · Continue IV rocephin (adjust as appropriate), trend WBC and follow up with pending infectious labs   · If not improving consider ID consult     Contact dermatitis  Assessment & Plan  · Family reports on/off itchy rash over the patients back, torso, and left lower extremity  · Patient lives in assisted living facility, family unsure of any new allergen exposures  · Reports being told was contact dermatitis and has been utilizing steroid cream at home with on/off improvement  · ED gave 60mg PO prednisone  · Continue PO prednisone,pepcid, claritin, and prn benadryl  · Attempt to find allergen exposure in outpatient setting   · Continue monitoring     Dementia ([address] )  Assessment & Plan  · Delirum precautions     Patient is Amish  Assessment & Plan  · History noted     VTE Pharmacologic Prophylaxis: VTE Score: 4 Moderate Risk (Score 3-4) - Pharmacological DVT Prophylaxis Ordered: enoxaparin (Lovenox)  Code Status: Level 1 - Full Code   Discussion with family: Updated  (daughter) at bedside  Anticipated Length of Stay: Patient will be admitted on an inpatient basis with an anticipated length of stay of greater than 2 midnights secondary to cellulitis   Total Time for Visit, including Counseling / Coordination of Care: 60 minutes Greater than 50% of this total time spent on direct patient counseling and coordination of care  Chief Complaint: worsening RLE cellulitis     History of Present Illness:  Daisy Hector is a 78 y o  female with a PMH of dementia who presents with worsening right lower extremity cellulitis  Patient with history of dementia, history obtained from daughters in room  Reports chronic RLE wound and redness and follows with wound care as an outpatient  She has received several courses of p o  Antibiotics over the years (has been several years since last), and family has been utilizing anti fungal cream is treatment as of late  Over the past few days the right lower extremity has had worsening redness and began to have clear fluid drainage  They also report on/off itchy rash along the patient's torso, back, and left lower extremity   They reported been told that it is contact dermatitis and have used steroid cream as an outpatient  All questions answered at the bedside to the best of my ability  Review of Systems:  Review of Systems   Unable to perform ROS: Dementia       Past Medical and Surgical History:   Past Medical History:   Diagnosis Date    Dementia ([address] )     Memory loss 2018       Past Surgical History:   Procedure Laterality Date    ANKLE FRACTURE SURGERY         Meds/Allergies:  Prior to Admission medications    Not on File     I have reviewed home medications with patient family member  Allergies: No Known Allergies    Social History:  Marital Status:    Occupation: N/A  Patient Pre-hospital Living Situation: Assisted Living  Patient Pre-hospital Diet Restrictions: heart healthy  Substance Use History:   Social History     Substance and Sexual Activity   Alcohol Use Not Currently     Social History     Tobacco Use   Smoking Status Never Smoker   Smokeless Tobacco Never Used     Social History     Substance and Sexual Activity   Drug Use No       Family History:  Family History   Problem Relation Age of Onset    Dementia Sister         Suri's sister is     Cancer Father        Physical Exam:     Vitals:   Blood Pressure: 127/65 (22 2300)  Pulse: 73 (22 2300)  Temperature: 97 5 °F (36 4 °C) (22 2300)  Temp Source: Oral (22 1634)  Respirations: 17 (22 2300)  SpO2: 97 % (22 2300)    Physical Exam  Vitals and nursing note reviewed  Constitutional:       General: She is not in acute distress  Appearance: Normal appearance  HENT:      Head: Normocephalic and atraumatic  Right Ear: External ear normal       Left Ear: External ear normal       Nose: Nose normal       Mouth/Throat:      Mouth: Mucous membranes are moist    Eyes:      Pupils: Pupils are equal, round, and reactive to light  Cardiovascular:      Rate and Rhythm: Normal rate and regular rhythm  Pulses: Normal pulses  Heart sounds: Normal heart sounds   No murmur heard  Pulmonary:      Effort: Pulmonary effort is normal  No respiratory distress  Breath sounds: Normal breath sounds  No wheezing or rales  Chest:      Chest wall: No tenderness  Abdominal:      General: Bowel sounds are normal  There is no distension  Palpations: Abdomen is soft  There is no mass  Tenderness: There is no abdominal tenderness  There is no guarding  Musculoskeletal:         General: No swelling or tenderness  Cervical back: Normal range of motion and neck supple  No rigidity or tenderness  Right lower leg: No edema  Left lower leg: No edema  Skin:     General: Skin is warm and dry  Capillary Refill: Capillary refill takes less than 2 seconds  Findings: Erythema (right lower extremity) present  No lesion or rash  Neurological:      General: No focal deficit present  Mental Status: She is alert  Psychiatric:         Mood and Affect: Mood normal           Additional Data:     Lab Results:  Results from last 7 days   Lab Units 06/21/22  1841   WBC Thousand/uL 8 64   HEMOGLOBIN g/dL 13 6   HEMATOCRIT % 40 7   PLATELETS Thousands/uL 163   NEUTROS PCT % 79*   LYMPHS PCT % 9*   MONOS PCT % 9   EOS PCT % 2     Results from last 7 days   Lab Units 06/21/22  1841   SODIUM mmol/L 142   POTASSIUM mmol/L 3 8   CHLORIDE mmol/L 107   CO2 mmol/L 27   BUN mg/dL 21   CREATININE mg/dL 0 69   ANION GAP mmol/L 8   CALCIUM mg/dL 9 4   ALBUMIN g/dL 3 8   TOTAL BILIRUBIN mg/dL 0 95   ALK PHOS U/L 110   ALT U/L 22   AST U/L 24   GLUCOSE RANDOM mg/dL 121     Results from last 7 days   Lab Units 06/21/22  1841   INR  1 01             Results from last 7 days   Lab Units 06/21/22  1841   LACTIC ACID mmol/L 0 9   PROCALCITONIN ng/ml <0 05       Imaging: Personally reviewed the following imaging: xray(s)  XR tibia fibula 2 views RIGHT    (Results Pending)       EKG and Other Studies Reviewed on Admission:   · EKG: NSR   HR 72     ** Please Note: This note has been constructed using a voice recognition system   **

## 2022-06-22 NOTE — WOUND OSTOMY CARE
Progress Note - Wound   Teresita Weaver 78 y o  female MRN: [id number]  Unit/Bed#: -01 Encounter: [id number]      Assessment:   Patient admitted due to cellulitis of right lower extremity  History of Dementia  Wound care consulted for right lower extremity wounds  Patient agreeable to assessment, alert and oriented to self, continent of bowel and bladder, stands independently for assessment, heels elevated, is an standby assist with care  Primary RN made aware of assessment findings  1  Bilateral sacrum, buttock, and heels- skin is dry, intact, blanchable pink skin  2  Patient noted with generalized rash to back, abdomen, and bilateral arms- SLIM is aware and primary RN aware  3  Right lower extremity- Wounds pictured and measured separately  Wounds noted to proximal lower leg and distal lower leg  Wounds are irregular in shape, partial thickness, 100% pink tissue, no drainage noted on assessment  Vandana-wounds are dry, intact, erythematous, swollen, no warmth noted, no induration  Patient being treated for cellulitis with IV antibiotics  Educated patient on importance of frequent offloading of pressure via turning, repositioning, and weight-shifting  Verbalized understanding of plan of care  No induration, fluctuance, odor, warmth, or purulence noted to the above noted wounds  New dressings applied  Patient tolerated well, denies pain to the wounds  Primary nurse aware of plan of care  See flow sheets for more detailed assessment findings  Will follow along  Skin care plans:  1-Hydraguard to bilateral sacrum, buttock and heels BID and PRN  2-Elevate heels to offload pressure  3-Ehob cushion in chair when out of bed  4-Moisturize skin daily with skin nourishing cream   5-Turn/reposition q2h for pressure re-distribution on skin  6-Right lower extremity-Cleanse wounds with NSS, pat dry  Apply layer of Dermagran over open aspects and apply Moisturizer skin nourishing cream to all intact skin  Change daily and as needed for soilage/dislodgment  Wound 01/21/22 Venous Ulcer Pretibial Proximal;Right (Active)   Wound Image     06/22/22 0805   Wound Description Pink, dry    Vandana-wound Assessment Dry; Intact; Erythema;Scaly; Swelling 06/22/22 0805   Wound Length (cm) 14 cm 06/22/22 0805   Wound Width (cm) 10 cm 06/22/22 0805   Wound Depth (cm) 0 1 cm 06/22/22 0805   Wound Surface Area (cm^2) 140 cm^2 06/22/22 0805   Wound Volume (cm^3) 14 cm^3 06/22/22 0805   Calculated Wound Volume (cm^3) 14 cm^3 06/22/22 0805   Change in Wound Size % [phone] 06/22/22 0805   Tunneling 0 cm 06/22/22 0805   Undermining 0 06/22/22 0805   Drainage Amount None 06/22/22 0805   Non-staged Wound Description Partial thickness 06/22/22 0805   Treatments Cleansed;Irrigation with NSS;Site care 06/22/22 0805   Dressing Dermagran gauze;ABD;Dry dressing 06/22/22 0805   Wound packed? No 06/22/22 0805   Dressing Changed Changed 06/22/22 0805   Patient Tolerance Tolerated well 06/22/22 0805   Dressing Status Clean;Dry; Intact        Wound 06/22/22 Pretibial Distal;Right;Lateral (Active)   Wound Image   06/22/22 0805   Wound Description Dry;Pink 06/22/22 0805   Vandana-wound Assessment Intact;Dry;Erythema;Swelling;Scaly 06/22/22 0805   Wound Length (cm) 6 cm 06/22/22 0805   Wound Width (cm) 12 cm 06/22/22 0805   Wound Depth (cm) 0 1 cm 06/22/22 0805   Wound Surface Area (cm^2) 72 cm^2 06/22/22 0805   Wound Volume (cm^3) 7 2 cm^3 06/22/22 0805   Calculated Wound Volume (cm^3) 7 2 cm^3 06/22/22 0805   Tunneling 0 cm 06/22/22 0805   Undermining 0 06/22/22 0805   Drainage Amount None 06/22/22 0805   Non-staged Wound Description Partial thickness 06/22/22 0805   Treatments Cleansed;Irrigation with NSS;Site care 06/22/22 0805   Dressing Dermagran gauze;ABD;Dry dressing 06/22/22 0805   Wound packed? No 06/22/22 0805   Dressing Changed Changed 06/22/22 0805   Patient Tolerance Tolerated well 06/22/22 0805   Dressing Status Clean;Dry; Intact 06/22/22 0805 Call or tigertext with any questions  Wound Care will continue to follow    Emery Sewell BSN RN Oneal Goncalves  Wound care

## 2022-06-22 NOTE — NURSING NOTE
Patient is pleasantly confused but will not stay in the bed  She is very fast and able to walk quickly toward the door  Redirection did not work, she is restless and a fall risk and safety concern  Reached out to slim for a 1:1 order at this time  Will continue to monitor

## 2022-06-22 NOTE — ASSESSMENT & PLAN NOTE
Patient with history of dementia, history obtained from [facility] in room  Reports chronic RLE wound and redness and follows with wound care as an outpatient  She has received several courses of p o  Antibiotics over the years  Family has been utilizing anti fungal cream is treatment as of late  Over the past few days the right lower extremity has had worsening redness and began to have clear fluid drainage  They also report on/off itchy rash along the patient's torso, back, and left lower extremity  They reported been told that it is contact dermatitis and have used steroid cream as an outpatient  /66   Pulse 94   Temp 98 1 °F (36 7 °C)   Resp 18   SpO2 97%     · Reports chronic RLE wound and redness   Niece at bedside endorsed improvement although still red  · Follows with outpatient wound care and has received several PO abx   · Family has been utilizing antifungal cream as of late (reports some transient improvement)  · Reporting worsening redness, clear fluid drainage, and chills over the past few days   · Large erythematous rash on the anterior right shin, with smaller erythematous patch on the right calf  · Appears wet, with no apparent wound   · There is minimal tenderness without underlying fluctuation felt   · Does not meet SEPSIS criteria on admission  · Afebrile; no leukocytosis; lactic acid WNL  · XR RLE pending official read  · ED gave IV rocephin  · MRSA swab ordered  · Continue IV rocephin (adjust as appropriate), trend WBC and follow up with pending infectious labs

## 2022-06-22 NOTE — DISCHARGE INSTR - OTHER ORDERS
Skin care plans:  1-Hydraguard to bilateral sacrum, buttock and heels twice a day  2-Elevate heels to offload pressure  3-Ehob cushion in chair when out of bed  4-Moisturize skin daily with skin nourishing cream   5--Right lower extremity-Cleanse wounds with NSS, pat dry  Apply layer of Dermagran over open aspects and apply Moisturizer skin nourishing cream to all intact skin  Change daily and as needed for soilage/dislodgment  Follow-up with [facility] wound care center as an outpatient- call [phone] for appt

## 2022-06-22 NOTE — ASSESSMENT & PLAN NOTE
· Family reports on/off itchy rash over the patients back, torso, and left lower extremity  · Patient lives in assisted living facility, family unsure of any new allergen exposures  · Reports being told was contact dermatitis and has been utilizing steroid cream at home with on/off improvement  · Continue PO prednisone,pepcid, claritin, and prn benadryl  · Attempt to find allergen exposure in outpatient setting   · Continue monitoring

## 2022-06-22 NOTE — ASSESSMENT & PLAN NOTE
· Family reports on/off itchy rash over the patients back, torso, and left lower extremity  · Patient lives in assisted living facility, family unsure of any new allergen exposures  · Reports being told was contact dermatitis and has been utilizing steroid cream at home with on/off improvement  · ED gave 60mg PO prednisone  · Continue PO prednisone,pepcid, claritin, and prn benadryl  · Attempt to find allergen exposure in outpatient setting   · Continue monitoring

## 2022-06-22 NOTE — ED PROVIDER NOTES
History  Chief Complaint   Patient presents with    Wound Check     Patient presents with circumferential redness to right calf with yellow drainage  Patient's daughters report that patient has been treating the infection for around 1 year and being seen at wound care for it  Increased redness, irritation and drainage and pain over the past several days  79 y/o female, hx of Dementia, presents to the ED for RLE rash  Patient's daughter states that she has had chronic RLE wound and redness and follows with wound care  States that over the last few days symptoms have worsened  She has been on several antibioitcs over the last few years for it  Family reports that they have been treating it with antifungal cream at home  They state that the wound has also been draining clear fluid over the last few days  They also report intermittent itchy rash over her torso, back, arms, and L lower extremity which they have been told was contact dermatitis and used steroid cream at home  They deny any fever, n/v, urinary symptoms, or sob  No other complaints  History provided by:  Patient  History limited by:  Dementia      None       Past Medical History:   Diagnosis Date    Dementia ([facility] [address] )     Memory loss        Past Surgical History:   Procedure Laterality Date    ANKLE FRACTURE SURGERY         Family History   Problem Relation Age of Onset    Dementia Sister         Suri's sister is     Cancer Father      I have reviewed and agree with the history as documented  E-Cigarette/Vaping    E-Cigarette Use Never User      E-Cigarette/Vaping Substances     Social History     Tobacco Use    Smoking status: Never Smoker    Smokeless tobacco: Never Used   Vaping Use    Vaping Use: Never used   Substance Use Topics    Alcohol use: Not Currently    Drug use: No       Review of Systems   Unable to perform ROS: Dementia       Physical Exam  Physical Exam  Vitals and nursing note reviewed  Constitutional:       Appearance: She is well-developed  HENT:      Head: Normocephalic and atraumatic  Eyes:      Pupils: Pupils are equal, round, and reactive to light  Cardiovascular:      Rate and Rhythm: Normal rate and regular rhythm  Pulmonary:      Effort: Pulmonary effort is normal       Breath sounds: Normal breath sounds  Abdominal:      General: Bowel sounds are normal       Palpations: Abdomen is soft  Musculoskeletal:         General: Normal range of motion  Cervical back: Normal range of motion and neck supple  Skin:     General: Skin is warm and dry  Findings: Erythema present  Comments: R extremity  NV intact distally    Neurological:      Mental Status: She is alert and oriented to person, place, and time        Comments: No focal deficits         Vital Signs  ED Triage Vitals   Temperature Pulse Respirations Blood Pressure SpO2   06/21/22 1634 06/21/22 1634 06/21/22 1634 06/21/22 1634 06/21/22 1634   98 1 °F (36 7 °C) 97 16 124/61 97 %      Temp Source Heart Rate Source Patient Position - Orthostatic VS BP Location FiO2 (%)   06/21/22 1634 06/21/22 1634 06/21/22 1634 06/21/22 1634 --   Oral Monitor Sitting Left arm       Pain Score       06/21/22 2237       No Pain           Vitals:    06/25/22 1911 06/25/22 2309 06/26/22 0655 06/26/22 1450   BP: 106/65 102/56 116/65 140/77   Pulse:  65  86   Patient Position - Orthostatic VS:             Visual Acuity      ED Medications  Medications   acetaminophen (TYLENOL) tablet 650 mg (has no administration in time range)   enoxaparin (LOVENOX) subcutaneous injection 40 mg (40 mg Subcutaneous Given 6/27/22 0937)   loratadine (CLARITIN) tablet 10 mg (10 mg Oral Given 6/27/22 0936)   diphenhydrAMINE (BENADRYL) injection 25 mg (25 mg Intravenous Given 6/26/22 2311)   QUEtiapine (SEROquel) tablet 25 mg (25 mg Oral Given 6/27/22 0936)   melatonin tablet 6 mg (6 mg Oral Given 6/26/22 2311)   ALPRAZolam (XANAX) tablet 0 25 mg (0 25 mg Oral Given 6/25/22 0016)   saccharomyces boulardii (FLORASTOR) capsule 250 mg (250 mg Oral Given 6/27/22 0936)   predniSONE tablet 40 mg (40 mg Oral Given 6/27/22 0936)     Followed by   predniSONE tablet 30 mg (has no administration in time range)   sodium chloride 0 9 % bolus 1,000 mL (1,000 mL Intravenous New Bag 6/21/22 1847)   cefTRIAXone (ROCEPHIN) 2,000 mg in dextrose 5 % 50 mL IVPB (0 mg Intravenous Stopped 6/21/22 1930)   predniSONE tablet 60 mg (60 mg Oral Given 6/21/22 1853)   hydrOXYzine HCL (ATARAX) tablet 50 mg (50 mg Oral Given 6/22/22 0108)   OLANZapine (ZyPREXA) IM injection 2 5 mg (2 5 mg Intramuscular Given 6/22/22 2357)   sterile water injection **ADS Override Pull** (10 mL  Given 6/23/22 0000)   melatonin tablet 6 mg (6 mg Oral Given 6/23/22 2235)   furosemide (LASIX) injection 40 mg (40 mg Intravenous Given 6/26/22 1534)       Diagnostic Studies  Results Reviewed     Procedure Component Value Units Date/Time    Blood culture #1 [[id number]] Collected: 06/21/22 1844    Lab Status: Final result Specimen: Blood from Arm, Right Updated: 06/26/22 2203     Blood Culture No Growth After 5 Days  Blood culture #2 [[id number]] Collected: 06/21/22 1841    Lab Status: Final result Specimen: Blood from Arm, Left Updated: 06/26/22 2203     Blood Culture No Growth After 5 Days      HS Troponin I 4hr [[id number]]  (Normal) Collected: 06/21/22 2352    Lab Status: Final result Specimen: Blood Updated: 06/22/22 0017     hs TnI 4hr 7 ng/L      Delta 4hr hsTnI 0 ng/L     UA w Reflex to Microscopic w Reflex to Culture [[id number]] Collected: 06/21/22 2352    Lab Status: Final result Specimen: Urine Updated: 06/21/22 2359     Color, UA Colorless     Clarity, UA Clear     Specific Gravity, UA <=1 005     pH, UA 6 0     Leukocytes, UA Negative     Nitrite, UA Negative     Protein, UA Negative mg/dl      Glucose, UA Negative mg/dl      Ketones, UA Negative mg/dl      Urobilinogen, UA 0 2 E U /dl      Bilirubin, UA Negative     Occult Blood, UA Negative    HS Troponin I 2hr [[id number]]  (Normal) Collected: 06/21/22 2040    Lab Status: Final result Specimen: Blood from Arm, Left Updated: 06/21/22 2110     hs TnI 2hr 8 ng/L      Delta 2hr hsTnI 1 ng/L     Procalcitonin [[id number]]  (Normal) Collected: 06/21/22 1841    Lab Status: Final result Specimen: Blood from Arm, Left Updated: 06/21/22 1928     Procalcitonin <0 05 ng/ml     HS Troponin 0hr (reflex protocol) [[id number]]  (Normal) Collected: 06/21/22 1841    Lab Status: Final result Specimen: Blood from Arm, Left Updated: 06/21/22 1926     hs TnI 0hr 7 ng/L     Lactic acid [[id number]]  (Normal) Collected: 06/21/22 1841    Lab Status: Final result Specimen: Blood from Arm, Left Updated: 06/21/22 1924     LACTIC ACID 0 9 mmol/L     Narrative:      Result may be elevated if tourniquet was used during collection      Comprehensive metabolic panel [[id number]] Collected: 06/21/22 1841    Lab Status: Final result Specimen: Blood from Arm, Left Updated: 06/21/22 1917     Sodium 142 mmol/L      Potassium 3 8 mmol/L      Chloride 107 mmol/L      CO2 27 mmol/L      ANION GAP 8 mmol/L      BUN 21 mg/dL      Creatinine 0 69 mg/dL      Glucose 121 mg/dL      Calcium 9 4 mg/dL      AST 24 U/L      ALT 22 U/L      Alkaline Phosphatase 110 U/L      Total Protein 7 2 g/dL      Albumin 3 8 g/dL      Total Bilirubin 0 95 mg/dL      eGFR 80 ml/min/1 73sq m     Narrative:      Prabhakar guidelines for Chronic Kidney Disease (CKD):     Stage 1 with normal or high GFR (GFR > 90 mL/min/1 73 square meters)    Stage 2 Mild CKD (GFR = 60-89 mL/min/1 73 square meters)    Stage 3A Moderate CKD (GFR = 45-59 mL/min/1 73 square meters)    Stage 3B Moderate CKD (GFR = 30-44 mL/min/1 73 square meters)    Stage 4 Severe CKD (GFR = 15-29 mL/min/1 73 square meters)    Stage 5 End Stage CKD (GFR <15 mL/min/1 73 square meters)  Note: GFR calculation is accurate only with a steady state creatinine    Protime-INR [[id number]]  (Normal) Collected: 06/21/22 1841    Lab Status: Final result Specimen: Blood from Arm, Left Updated: 06/21/22 1915     Protime 12 9 seconds      INR 1 01    APTT [[id number]]  (Normal) Collected: 06/21/22 1841    Lab Status: Final result Specimen: Blood from Arm, Left Updated: 06/21/22 1915     PTT 32 seconds     CBC and differential [[id number]]  (Abnormal) Collected: 06/21/22 1841    Lab Status: Final result Specimen: Blood from Arm, Left Updated: 06/21/22 1855     WBC 8 64 Thousand/uL      RBC 3 96 Million/uL      Hemoglobin 13 6 g/dL      Hematocrit 40 7 %       fL      MCH 34 3 pg      MCHC 33 4 g/dL      RDW 14 2 %      MPV 10 8 fL      Platelets 560 Thousands/uL      nRBC 0 /100 WBCs      Neutrophils Relative 79 %      Immat GRANS % 0 %      Lymphocytes Relative 9 %      Monocytes Relative 9 %      Eosinophils Relative 2 %      Basophils Relative 1 %      Neutrophils Absolute 6 81 Thousands/µL      Immature Grans Absolute 0 02 Thousand/uL      Lymphocytes Absolute 0 76 Thousands/µL      Monocytes Absolute 0 80 Thousand/µL      Eosinophils Absolute 0 21 Thousand/µL      Basophils Absolute 0 04 Thousands/µL                  XR tibia fibula 2 views RIGHT   Final Result by Alis Davison MD (06/22 6112)      No acute osseous abnormality  No radiographic evidence of osteomyelitis  Workstation performed: [id number]                    Procedures  Procedures         ED Course                                             MDM  Number of Diagnoses or Management Options  Cellulitis of right leg: new and requires workup  Contact dermatitis: new and requires workup  Diagnosis management comments: Patient with right lower extremity cellulitis- will get septic workup, give IV abx, and admit  Patient reevaluated and feels improved  Family updated on results of tests and plan of care including admission to hospital for further evaluation of presenting complaint   Patient demonstrates verbal understanding and agrees with plan  Report to Regla Nava  with KARISSA for continuation of patient care  Amount and/or Complexity of Data Reviewed  Clinical lab tests: ordered and reviewed  Tests in the radiology section of CPT®: ordered and reviewed  Tests in the medicine section of CPT®: ordered and reviewed  Discussion of test results with the performing providers: yes  Decide to obtain previous medical records or to obtain history from someone other than the patient: yes  Obtain history from someone other than the patient: yes  Review and summarize past medical records: yes  Discuss the patient with other providers: yes  Independent visualization of images, tracings, or specimens: yes    Patient Progress  Patient progress: improved      Disposition  Final diagnoses:   Cellulitis of right leg   Contact dermatitis     Time reflects when diagnosis was documented in both MDM as applicable and the Disposition within this note     Time User Action Codes Description Comment    6/21/2022  8:44 PM Adarsh CARABALLO Add [L03 115] Cellulitis of right leg     6/21/2022  8:44 PM Adarsh CARABALLO Add [L25 9] Contact dermatitis       ED Disposition     ED Disposition   Admit    Condition   Stable    Date/Time   Tue Jun 21, 2022  8:43 PM    Comment   Case was discussed with KARISSA and the patient's admission status was agreed to be Admission Status: inpatient status to the service of Dr Delroy Cruz   Follow-up Information     Follow up With Specialties Details Why [address] [facility]  Follow up [facility] [address]  Requested: visiting nurse/wound care, occupational therapy and a home health aide  They will call someone in the family to coordinate visits before coming out to the home   [address] [facility] [address] [facility] [id number] [phone]            Current Discharge Medication List      START taking these medications    Details   loratadine (Ramy Cancino) 10 mg tablet Take 1 tablet (10 mg total) by mouth daily  Qty: 15 tablet, Refills: 0    Associated Diagnoses: Contact dermatitis      predniSONE 10 mg tablet Take 2 tablets (20 mg total) by mouth daily for 3 doses  Qty: 6 tablet, Refills: 0    Associated Diagnoses: Contact dermatitis             Outpatient Discharge Orders   EXTERNAL: Ambulatory Referral to Home Health   Standing Status: Future Standing Exp   Date: 06/27/23      Discharge Diet     Activity as tolerated     Call provider for:  severe uncontrolled pain     Call provider for:       PDMP Review     None          ED Provider  Electronically Signed by           Cristiana Marks DO  06/27/22 1429

## 2022-06-22 NOTE — PROGRESS NOTES
[facility]  Progress Note Alcus Schooling 1940, 78 y o  female MRN: [id number]  Unit/Bed#: -Kana Encounter: [id number]  Primary Care Provider: Vilma Jay DO   Date and time admitted to hospital: 6/21/2022  5:16 PM    Body mass index (BMI) of 19 or less in adult  Assessment & Plan  Patient is below normal BMI  There is no nutritionist in this hospital to determine the definitive and or presumptive diagnosis  Will consider under weight in the setting of dementia and decreased appetite  Will add supplemental tray     Dementia ([facility] [address] )  Assessment & Plan  · Delirum precautions   · Previously on a one-to-one since discontinued    Contact dermatitis  Assessment & Plan  · Family reports on/off itchy rash over the patients back, torso, and left lower extremity  · Reports being told was contact dermatitis and has been utilizing steroid cream at home with on/off improvement  · Continue PO prednisone,pepcid, claritin, and prn benadryl  · Encouraged outpatient Dermatology follow-up    Patient is Zoroastrian  Assessment & Plan  · History noted     * Cellulitis of right lower extremity  Assessment & Plan  Patient with history of dementia, history obtained from niece in room  Reports chronic RLE wound and redness and follows with wound care as an outpatient  She has received several courses of p o  Antibiotics over the years  Family has been utilizing anti fungal cream is treatment as of late  Over the past few days the right lower extremity has had worsening redness and began to have clear fluid drainage  · Reports chronic RLE wound and redness     · Large erythematous rash on the anterior right shin, with smaller erythematous patch on the right calf  · Appears wet, with no apparent wound   · There is minimal tenderness without underlying fluctuation felt   · XR RLE pending no evidence of osteomyelitis  · Continue Ancef  · MRSA swab ordered  · Continue IV rocephin (adjust as appropriate), trend WBC and follow up with pending infectious labs         VTE Pharmacologic Prophylaxis: VTE Score: 4 Moderate Risk (Score 3-4) - Pharmacological DVT Prophylaxis Ordered: enoxaparin (Lovenox)  Patient Centered Rounds: I performed bedside rounds with nursing staff today  Discussions with Specialists or Other Care Team Provider: ROBERTA    Education and Discussions with Family / Patient: Updated  (niece) at bedside  Time Spent for Care: 30 minutes  More than 50% of total time spent on counseling and coordination of care as described above  Current Length of Stay: 2 day(s)  Current Patient Status: Inpatient   Certification Statement: The patient will continue to require additional inpatient hospital stay due to RLE cellulitis  Discharge Plan: Anticipate discharge in 48-72 hrs to home with home services  Code Status: Level 1 - Full Code    Subjective: Complaint of RLE redness, drainage and itching    Objective:     Vitals:   Temp (24hrs), Av 2 °F (36 2 °C), Min:97 2 °F (36 2 °C), Max:97 2 °F (36 2 °C)    Temp:  [97 2 °F (36 2 °C)] 97 2 °F (36 2 °C)  Resp:  [18] 18  BP: (104)/(57) 104/57  There is no height or weight on file to calculate BMI  Input and Output Summary (last 24 hours): Intake/Output Summary (Last 24 hours) at 2022 1809  Last data filed at 2022 1239  Gross per 24 hour   Intake 600 ml   Output 800 ml   Net -200 ml       Physical Exam:   Vitals and nursing note reviewed  Constitutional:       General: She is not in acute distress  Appearance: Normal appearance  HENT:      Head: Normocephalic and atraumatic  Right Ear: External ear normal       Left Ear: External ear normal       Nose: Nose normal       Mouth/Throat:      Mouth: Mucous membranes are moist    Eyes:      Pupils: Pupils are equal, round, and reactive to light  Cardiovascular:      Rate and Rhythm: Normal rate and regular rhythm  Pulses: Normal pulses        Heart sounds: Normal heart sounds  No murmur heard  Pulmonary:      Effort: Pulmonary effort is normal  No respiratory distress  Breath sounds: Normal breath sounds  No wheezing or rales  Chest:      Chest wall: No tenderness  Abdominal:      General: Bowel sounds are normal  There is no distension  Palpations: Abdomen is soft  There is no mass  Tenderness: There is no abdominal tenderness  There is no guarding  Musculoskeletal:         General: No swelling or tenderness  Cervical back: Normal range of motion and neck supple  No rigidity or tenderness  Right lower leg: No edema  Left lower leg: No edema  Skin:     General: Skin is warm and dry  Capillary Refill: Capillary refill takes less than 2 seconds  Findings: Erythema (right lower extremity) present  No lesion or rash  Neurological:      General: No focal deficit present  Mental Status: She is alert     Psychiatric:         Mood and Affect: Mood normal         Additional Data:     Labs:  Results from last 7 days   Lab Units 06/23/22  0639   WBC Thousand/uL 9 31   HEMOGLOBIN g/dL 11 4*   HEMATOCRIT % 35 2   PLATELETS Thousands/uL 154   NEUTROS PCT % 76*   LYMPHS PCT % 14   MONOS PCT % 9   EOS PCT % 1     Results from last 7 days   Lab Units 06/23/22  0639   SODIUM mmol/L 147*   POTASSIUM mmol/L 4 0   CHLORIDE mmol/L 113*   CO2 mmol/L 25   BUN mg/dL 34*   CREATININE mg/dL 0 66   ANION GAP mmol/L 9   CALCIUM mg/dL 9 2   ALBUMIN g/dL 3 2*   TOTAL BILIRUBIN mg/dL 0 62   ALK PHOS U/L 85   ALT U/L 19   AST U/L 26   GLUCOSE RANDOM mg/dL 101     Results from last 7 days   Lab Units 06/21/22  1841   INR  1 01             Results from last 7 days   Lab Units 06/21/22  1841   LACTIC ACID mmol/L 0 9   PROCALCITONIN ng/ml <0 05       Lines/Drains:  Invasive Devices  Report    Peripheral Intravenous Line  Duration           Peripheral IV 06/22/22 Right;Ventral (anterior) Forearm 1 day                      Imaging: No pertinent imaging reviewed  Recent Cultures (last 7 days):   Results from last 7 days   Lab Units 06/21/22  1844 06/21/22  1841   BLOOD CULTURE  No Growth at 24 hrs  No Growth at 24 hrs  Last 24 Hours Medication List:   Current Facility-Administered Medications   Medication Dose Route Frequency Provider Last Rate    acetaminophen  650 mg Oral Q6H PRN Luke Sierra PA-C      cefazolin  2,000 mg Intravenous Q8H Je Edgar, CRNP 2,000 mg (06/23/22 1100)    diphenhydrAMINE  25 mg Intravenous Q6H PRN Luke Sierra PA-C      enoxaparin  40 mg Subcutaneous Daily [facility] [place] [place]      famotidine  20 mg Oral BID [place], [place]      haloperidol lactate  2 mg Intramuscular Once [place], [place]      loratadine  10 mg Oral Daily [facility] [place], [place]      predniSONE  50 mg Oral Daily [facility] [place]          Today, Patient Was Seen By: Iveth Munoz MD    **Please Note: This note may have been constructed using a voice recognition system  **

## 2022-06-22 NOTE — ASSESSMENT & PLAN NOTE
Patient with history of dementia, history obtained from daughters in room  Reports chronic RLE wound and redness and follows with wound care as an outpatient  She has received several courses of p o  Antibiotics over the years (has been several years since last), and family has been utilizing anti fungal cream is treatment as of late  Over the past few days the right lower extremity has had worsening redness and began to have clear fluid drainage  They also report on/off itchy rash along the patient's torso, back, and left lower extremity  They reported been told that it is contact dermatitis and have used steroid cream as an outpatient      /58   Pulse 64   Temp 98 1 °F (36 7 °C) (Oral)   Resp 16   SpO2 98%     · Reports chronic RLE wound and redness  · Follows with outpatient wound care and has received several PO abx   · Family has been utilizing antifungal cream as of late (reports some transient improvement)  · Reporting worsening redness, clear fluid drainage, and chills over the past few days   · Large erythematous rash on the anterior right shin, with smaller erythematous patch on the right calf  · Appears wet, with no apparent wound   · There is minimal tenderness without underlying fluctuation felt   · Does not meet SEPSIS criteria on admission  · Afebrile; no leukocytosis; lactic acid WNL  · XR RLE pending official read  · ED gave IV rocephin  · MRSA swab ordered  · Continue IV rocephin (adjust as appropriate), trend WBC and follow up with pending infectious labs   · If not improving consider ID consult

## 2022-06-23 ENCOUNTER — HOME HEALTH ADMISSION (OUTPATIENT)
Dept: HOME HEALTH SERVICES | Facility: HOME HEALTHCARE | Age: 82
End: 2022-06-23

## 2022-06-23 LAB
ALBUMIN SERPL BCP-MCNC: 3.2 G/DL (ref 3.5–5)
ALP SERPL-CCNC: 85 U/L (ref 46–116)
ALT SERPL W P-5'-P-CCNC: 19 U/L (ref 12–78)
ANION GAP SERPL CALCULATED.3IONS-SCNC: 9 MMOL/L (ref 4–13)
AST SERPL W P-5'-P-CCNC: 26 U/L (ref 5–45)
ATRIAL RATE: 72 BPM
BASOPHILS # BLD AUTO: 0.04 THOUSANDS/ΜL (ref 0–0.1)
BASOPHILS NFR BLD AUTO: 0 % (ref 0–1)
BILIRUB SERPL-MCNC: 0.62 MG/DL (ref 0.2–1)
BUN SERPL-MCNC: 34 MG/DL (ref 5–25)
CALCIUM ALBUM COR SERPL-MCNC: 9.8 MG/DL (ref 8.3–10.1)
CALCIUM SERPL-MCNC: 9.2 MG/DL (ref 8.3–10.1)
CHLORIDE SERPL-SCNC: 113 MMOL/L (ref 100–108)
CO2 SERPL-SCNC: 25 MMOL/L (ref 21–32)
CREAT SERPL-MCNC: 0.66 MG/DL (ref 0.6–1.3)
EOSINOPHIL # BLD AUTO: 0.12 THOUSAND/ΜL (ref 0–0.61)
EOSINOPHIL NFR BLD AUTO: 1 % (ref 0–6)
ERYTHROCYTE [DISTWIDTH] IN BLOOD BY AUTOMATED COUNT: 14.6 % (ref 11.6–15.1)
GFR SERPL CREATININE-BSD FRML MDRD: 81 ML/MIN/1.73SQ M
GLUCOSE SERPL-MCNC: 101 MG/DL (ref 65–140)
HCT VFR BLD AUTO: 35.2 % (ref 34.8–46.1)
HGB BLD-MCNC: 11.4 G/DL (ref 11.5–15.4)
IMM GRANULOCYTES # BLD AUTO: 0.04 THOUSAND/UL (ref 0–0.2)
IMM GRANULOCYTES NFR BLD AUTO: 0 % (ref 0–2)
LYMPHOCYTES # BLD AUTO: 1.28 THOUSANDS/ΜL (ref 0.6–4.47)
LYMPHOCYTES NFR BLD AUTO: 14 % (ref 14–44)
MCH RBC QN AUTO: 33.5 PG (ref 26.8–34.3)
MCHC RBC AUTO-ENTMCNC: 32.4 G/DL (ref 31.4–37.4)
MCV RBC AUTO: 104 FL (ref 82–98)
MONOCYTES # BLD AUTO: 0.86 THOUSAND/ΜL (ref 0.17–1.22)
MONOCYTES NFR BLD AUTO: 9 % (ref 4–12)
NEUTROPHILS # BLD AUTO: 6.97 THOUSANDS/ΜL (ref 1.85–7.62)
NEUTS SEG NFR BLD AUTO: 76 % (ref 43–75)
NRBC BLD AUTO-RTO: 0 /100 WBCS
P AXIS: 66 DEGREES
PLATELET # BLD AUTO: 154 THOUSANDS/UL (ref 149–390)
PMV BLD AUTO: 10.9 FL (ref 8.9–12.7)
POTASSIUM SERPL-SCNC: 4 MMOL/L (ref 3.5–5.3)
PR INTERVAL: 170 MS
PROT SERPL-MCNC: 6.2 G/DL (ref 6.4–8.2)
QRS AXIS: 94 DEGREES
QRSD INTERVAL: 84 MS
QT INTERVAL: 378 MS
QTC INTERVAL: 413 MS
RBC # BLD AUTO: 3.4 MILLION/UL (ref 3.81–5.12)
SODIUM SERPL-SCNC: 147 MMOL/L (ref 136–145)
T WAVE AXIS: 48 DEGREES
VENTRICULAR RATE: 72 BPM
WBC # BLD AUTO: 9.31 THOUSAND/UL (ref 4.31–10.16)

## 2022-06-23 PROCEDURE — 99232 SBSQ HOSP IP/OBS MODERATE 35: CPT | Performed by: NURSE PRACTITIONER

## 2022-06-23 PROCEDURE — 93010 ELECTROCARDIOGRAM REPORT: CPT | Performed by: INTERNAL MEDICINE

## 2022-06-23 PROCEDURE — 80053 COMPREHEN METABOLIC PANEL: CPT | Performed by: HOSPITALIST

## 2022-06-23 PROCEDURE — 85025 COMPLETE CBC W/AUTO DIFF WBC: CPT | Performed by: HOSPITALIST

## 2022-06-23 RX ORDER — CEFAZOLIN SODIUM 2 G/50ML
2000 SOLUTION INTRAVENOUS EVERY 8 HOURS
Status: DISCONTINUED | OUTPATIENT
Start: 2022-06-23 | End: 2022-06-26

## 2022-06-23 RX ORDER — HALOPERIDOL 5 MG/ML
2 INJECTION INTRAMUSCULAR ONCE
Status: DISCONTINUED | OUTPATIENT
Start: 2022-06-23 | End: 2022-06-24

## 2022-06-23 RX ORDER — LANOLIN ALCOHOL/MO/W.PET/CERES
6 CREAM (GRAM) TOPICAL ONCE
Status: COMPLETED | OUTPATIENT
Start: 2022-06-23 | End: 2022-06-23

## 2022-06-23 RX ADMIN — Medication 6 MG: at 22:35

## 2022-06-23 RX ADMIN — CEFAZOLIN SODIUM 2000 MG: 2 SOLUTION INTRAVENOUS at 11:00

## 2022-06-23 RX ADMIN — FAMOTIDINE 20 MG: 20 TABLET ORAL at 10:39

## 2022-06-23 RX ADMIN — CEFAZOLIN SODIUM 2000 MG: 2 SOLUTION INTRAVENOUS at 20:25

## 2022-06-23 RX ADMIN — WATER 10 ML: 1 INJECTION INTRAMUSCULAR; INTRAVENOUS; SUBCUTANEOUS at 00:00

## 2022-06-23 RX ADMIN — LORATADINE 10 MG: 10 TABLET ORAL at 10:39

## 2022-06-23 RX ADMIN — PREDNISONE 50 MG: 20 TABLET ORAL at 10:39

## 2022-06-23 RX ADMIN — FAMOTIDINE 20 MG: 20 TABLET ORAL at 18:35

## 2022-06-23 RX ADMIN — ENOXAPARIN SODIUM 40 MG: 40 INJECTION SUBCUTANEOUS at 10:00

## 2022-06-23 NOTE — ASSESSMENT & PLAN NOTE
Patient is below normal BMI  There is no nutritionist in this hospital to determine the definitive and or presumptive diagnosis  Will consider under weight in the setting of dementia and decreased appetite     Nutritional supplements

## 2022-06-23 NOTE — QUICK NOTE
Patient with acute agitation  Patient unable to be redirected  Harmful to self and others  RN paged control team who assisted patient back in bed  Soft restraints placed   Ordered zyprexa 2 5mg x1

## 2022-06-23 NOTE — ASSESSMENT & PLAN NOTE
Patient with history of dementia, history obtained from niece in room  Reports chronic RLE wound and redness and follows with wound care as an outpatient  She has received several courses of p o  Antibiotics over the years  Family has been utilizing anti fungal cream is treatment as of late  Over the past few days the right lower extremity has had worsening redness and began to have clear fluid drainage  · Reports chronic RLE wound and redness     · Large erythematous rash on the anterior right shin, with smaller erythematous patch on the right calf  · Appears wet, with no apparent wound   · There is minimal tenderness without underlying fluctuation felt   · XR RLE pending no evidence of osteomyelitis  · Continue Ancef  · MRSA swab ordered  · Continue IV rocephin (adjust as appropriate), trend WBC and follow up with pending infectious labs

## 2022-06-23 NOTE — PLAN OF CARE
Problem: SAFETY,RESTRAINT: [place]/NON-SELF DESTRUCTIVE BEHAVIOR  Goal: Remains free of harm/injury (restraint for non violent/non self-detsructive behavior)  Description: INTERVENTIONS:  - Instruct patient/family regarding restraint use   - Assess and monitor physiologic and psychological status   - Provide interventions and comfort measures to meet assessed patient needs   - Identify and implement measures to help patient regain control  - Assess readiness for release of restraint   Outcome: Progressing

## 2022-06-23 NOTE — NURSING NOTE
I am one on one with pt [email or web address] I see she is very restless, try to redirect but , she don't listen, she kept on telling she has to see 4 individuals, she want to go out of her room but as soon as we are walking in the hallway she enters other pt room one after the other, redirect pt but she is very defiant, encourage her to go back her room, I ask another PCA to take pt back inside her room  These time I  front of the door to stop her walking around holding the ivy pole  45 minutes later or so she became agitated telling me you don't want me to hurt your head

## 2022-06-23 NOTE — CASE MANAGEMENT
Case Management Assessment & Discharge Planning Note    Patient name Lisbet Pretty  Location [address] [id number] MRN [id number]  : 1940 Date 2022       Current Admission Date: 2022  Current Admission Diagnosis:Cellulitis of right lower extremity   Patient Active Problem List    Diagnosis Date Noted    Cellulitis of right lower extremity 2022    Contact dermatitis 2022    Dementia ([facility] [address] )     Patient is Lutheran 2021    Mild protein-calorie malnutrition ([facility] [address] )  2021    Chronic venous hypertension (idiopathic) with ulcer of right lower extremity ([facility] [address] ) 2021      LOS (days): 2  Geometric Mean LOS (GMLOS) (days): 3 20  Days to GMLOS:1 5     OBJECTIVE:    Risk of Unplanned Readmission Score: 14 03         Current admission status: Inpatient       Preferred Pharmacy:   [address] [facility], [address]  [facility] [address]  Phone: [phone] Fax: [phone]    Primary Care Provider: Ruddy Petersen DO    Primary Insurance: MEDICARE  Secondary Insurance: AARP    ASSESSMENT:  Lorena Jacobo Proxies     Jourdan Valverde  16  - Niece   Primary Phone: [phone] (Mobile)               Advance Directives  Does patient have a [address] [place] [address] Avenue?: Yes  Does patient have Advance Directives?: Yes  Advance Directives: Living will, Power of  for health care  Primary Contact: all 4 nieces and nephews: Keon Come and [address]     Readmission Root Cause  30 Day Readmission: No    Patient Information  Admitted from[de-identified] Home  Mental Status: Alert, Confused (Patient is very pleasant but extremely confused  She reports she's just visiting here for a short time but lives with her parents )  During Assessment patient was accompanied by:  Other-Comment (niece, [address])  Assessment information provided by[de-identified] Other - please comment (niece [address] )  Primary Caregiver: Self  Support Systems: Family members (her 4 nieces and nephews are very supportive  She lives in a home attached to Natividad and his wife  They provide breakfast and lunch and help with iADLs  Shelby visits every day to bring dinner )  [place] of Residence: [place] [address] do you live in?: [place]  Type of Current Residence: Other (Comment) (Patient lives in a duplex attached to her nephew Natividad and his wife's home   They have made it safe for her to live in "alone" and are over often)  In the last 12 months, was there a time when you were not able to pay the mortgage or rent on time?: No  In the last 12 months, how many places have you lived?: 1  In the last 12 months, was there a time when you did not have a steady place to sleep or slept in a shelter (including now)?: No  Living Arrangements: Lives Alone    Activities of Daily Living Prior to Admission  Functional Status: Assistance  Completes ADLs independently?: Yes  Ambulates independently?: Yes  Does patient use assisted devices?: No  Does patient currently own DME?: No  Does patient have a history of Outpatient Therapy (PT/OT)?: No  Does the patient have a history of Short-Term Rehab?: No  Does patient have a history of HHC?: No     Patient Information Continued  Does patient have prescription coverage?: Yes  Within the past 12 months, you worried that your food would run out before you got the money to buy more : Never true  Within the past 12 months, the food you bought just didn't last and you didn't have money to get more : Never true  Does patient receive dialysis treatments?: No  Does patient have a history of substance abuse?: No  Does patient have a history of Mental Health Diagnosis?: No     Means of Transportation  [facility]] Family transport  In the past 12 months, has lack of transportation kept you from medical appointments or from getting medications?: No  In the past 12 months, has lack of transportation kept you from meetings, work, or from getting things needed for daily living?: No    DISCHARGE DETAILS:    Discharge planning discussed with[de-identified] patient and her niece Shirley Vanegas at bedside  Freedom of Choice: Yes  Comments - Freedom of Choice: CM met w/ patient and her niece to introduce self/role  Patient is alert but very confused and so all history was obtained from donald Vanegas  Patient's family would like her to dc home with [facility] for SN/wound care [place]  Patient is very active and so they deny any need for therapy at home at this time  They will provide tranpsortation home and do not have [facility] agency preference  CM agreed to send blanket referral and will f/u with family re: available agencies  CM contacted family/caregiver?: Yes (donald Vanegas at bedside  CM also received call from [facility] indicating they're definitely interested in SN at home for wound care )  Were Treatment Team discharge recommendations reviewed with patient/caregiver?: Yes  Did patient/caregiver verbalize understanding of patient care needs?: Yes  Were patient/caregiver advised of the risks associated with not following Treatment Team discharge recommendations?: Yes    Contacts  Patient Contacts: donald Vanegas  Relationship to Patient[de-identified] Family  Contact Method:  In Person  Reason/Outcome: Continuity of Care, Discharge Planning, Referral    Requested [address]         Is the patient interested in [facility] at discharge?: Yes  [address] requested[de-identified] [address] Name[de-identified] Other (TBD)  [facility] Provider[de-identified] PCP  Home Health Services Needed[de-identified] Evaluate Functional Status and Safety, Wound/Ostomy Care  Homebound Criteria Met[de-identified] Requires the Assistance of Another Person for Safe Ambulation or to Leave the Home  Supporting Clincal Findings[de-identified] Cognitive Deficit Requiring the Assistance of Others, Fatigues Easliy in [employer], Limited Endurance     Other Referral/Resources/Interventions Provided:  Interventions: [facility]  Referral Comments: CM sent blanket referral via [facility] for SN for wound care     Would you like to participate in our [address] service program?  : No - Declined    Treatment Team Recommendation: Home with [address] [place] [address]  Discharge Destination Plan[de-identified] Home with Hiral at Discharge : Family

## 2022-06-23 NOTE — NURSING NOTE
Patient was asleep for about 4 hours upon awakening around 2330  She was very agitated and unable to be redirected  Several times she left her room and was not able to express herself  Patient left her room and stated she was leaving  Several staff and myself tried to redirect her back to the room  A control team was needed to have patient placed in bed and restrained  Patient was angrily kicking and scratching staff  Zyprexa given and lights were turned down  At this time she is still restless  Will continue to monitor

## 2022-06-23 NOTE — ASSESSMENT & PLAN NOTE
· Family reports on/off itchy rash over the patients back, torso, and left lower extremity  · Reports being told was contact dermatitis and has been utilizing steroid cream at home with on/off improvement  · Continue PO prednisone,pepcid, claritin, and prn benadryl  · Encouraged outpatient Dermatology follow-up

## 2022-06-23 NOTE — ASSESSMENT & PLAN NOTE
Patient with history of dementia, history obtained from niece in room  Reports chronic RLE wound and redness and follows with wound care as an outpatient  She has received several courses of p o  Antibiotics over the years  Family has been utilizing anti fungal cream is treatment as of late  Over the past few days the right lower extremity has had worsening redness and began to have clear fluid drainage  · Reports chronic RLE wound and redness     · Large erythematous rash on the anterior right shin, with smaller erythematous patch on the right calf  · Wound care following, some drainage noted  · XR RLE  no evidence of osteomyelitis  · Continue Ancef  · Blood cultures negative for 48 hours

## 2022-06-23 NOTE — PROGRESS NOTES
[address]  Progress Note Deopal Rogeliobuddy 1940, 78 y o  female MRN: [id number]  Unit/Bed#: -Kana Encounter: [id number]  Primary Care Provider: Mushtaq Gipson DO   Date and time admitted to hospital: 6/21/2022  5:16 PM    * Cellulitis of right lower extremity  Assessment & Plan  Patient with history of dementia, history obtained from niece in room  Reports chronic RLE wound and redness and follows with wound care as an outpatient  She has received several courses of p o  Antibiotics over the years  Family has been utilizing anti fungal cream is treatment as of late  Over the past few days the right lower extremity has had worsening redness and began to have clear fluid drainage  · Reports chronic RLE wound and redness  · Large erythematous rash on the anterior right shin, with smaller erythematous patch on the right calf  · Appears wet, with no apparent wound   · There is minimal tenderness without underlying fluctuation felt   · XR RLE pending no evidence of osteomyelitis  · Continue Ancef  · MRSA swab ordered  · Continue IV rocephin (adjust as appropriate), trend WBC and follow up with pending infectious labs     Dementia [facility])  Assessment & Plan  · Delirum precautions   · Previously on a one-to-one since discontinued    Contact dermatitis  Assessment & Plan  · Family reports on/off itchy rash over the patients back, torso, and left lower extremity  · Reports being told was contact dermatitis and has been utilizing steroid cream at home with on/off improvement  · Continue PO prednisone,pepcid, claritin, and prn benadryl  · Encouraged outpatient Dermatology follow-up    VTE Pharmacologic Prophylaxis: VTE Score: 4 Moderate Risk (Score 3-4) - Pharmacological DVT Prophylaxis Ordered: enoxaparin (Lovenox)  Patient Centered Rounds: I performed bedside rounds with nursing staff today    Discussions with Specialists or Other Care Team Provider:  Reviewed notes    Education and Discussions with Family / Patient:  Discussed with patient and family    Time Spent for Care: 20 minutes  More than 50% of total time spent on counseling and coordination of care as described above  Current Length of Stay: 2 day(s)  Current Patient Status: Inpatient   Certification Statement: The patient will continue to require additional inpatient hospital stay due to Antibiotics improvement of redness  Discharge Plan: Anticipate discharge in 48 hrs to prior assisted or independent living facility  Code Status: Level 1 - Full Code    Subjective:   Patient was resting in bed family at bedside, one-to-one at bedside does not appear to be in any acute distress family states that redness on her leg has not had much change antibiotics adjusted monitor for improvement    Objective:     Vitals:   Temp (24hrs), Av 7 °F (36 5 °C), Min:97 2 °F (36 2 °C), Max:98 1 °F (36 7 °C)    Temp:  [97 2 °F (36 2 °C)-98 1 °F (36 7 °C)] 97 2 °F (36 2 °C)  HR:  [94] 94  Resp:  [18] 18  BP: (104-130)/(57-72) 104/57  There is no height or weight on file to calculate BMI  Input and Output Summary (last 24 hours): Intake/Output Summary (Last 24 hours) at 2022 1014  Last data filed at 2022 2300  Gross per 24 hour   Intake 360 ml   Output --   Net 360 ml       Physical Exam:   Physical Exam  Vitals reviewed  Cardiovascular:      Rate and Rhythm: Normal rate  Skin:     General: Skin is warm  Neurological:      General: No focal deficit present  Mental Status: She is alert     Psychiatric:         Mood and Affect: Mood normal          Additional Data:     Labs:  Results from last 7 days   Lab Units 22  0639   WBC Thousand/uL 9 31   HEMOGLOBIN g/dL 11 4*   HEMATOCRIT % 35 2   PLATELETS Thousands/uL 154   NEUTROS PCT % 76*   LYMPHS PCT % 14   MONOS PCT % 9   EOS PCT % 1     Results from last 7 days   Lab Units 22  0639   SODIUM mmol/L 147*   POTASSIUM mmol/L 4 0   CHLORIDE mmol/L 113*   CO2 mmol/L 25   BUN mg/dL 34*   CREATININE mg/dL 0 66   ANION GAP mmol/L 9   CALCIUM mg/dL 9 2   ALBUMIN g/dL 3 2*   TOTAL BILIRUBIN mg/dL 0 62   ALK PHOS U/L 85   ALT U/L 19   AST U/L 26   GLUCOSE RANDOM mg/dL 101     Results from last 7 days   Lab Units 06/21/22  1841   INR  1 01             Results from last 7 days   Lab Units 06/21/22  1841   LACTIC ACID mmol/L 0 9   PROCALCITONIN ng/ml <0 05       Lines/Drains:  Invasive Devices  Report    Peripheral Intravenous Line  Duration           Peripheral IV 06/22/22 Right;Ventral (anterior) Forearm <1 day                Recent Cultures (last 7 days):   Results from last 7 days   Lab Units 06/21/22  1844 06/21/22  1841   BLOOD CULTURE  No Growth at 24 hrs  No Growth at 24 hrs  Last 24 Hours Medication List:   Current Facility-Administered Medications   Medication Dose Route Frequency Provider Last Rate    acetaminophen  650 mg Oral Q6H PRN [facility] PAClint      cefazolin  2,000 mg Intravenous Q8H KAREN Steele      diphenhydrAMINE  25 mg Intravenous Q6H PRN Johan Tejeda HelperMCKENNA      enoxaparin  40 mg Subcutaneous Daily [place], [place]      famotidine  20 mg Oral BID [place], [place]      haloperidol lactate  2 mg Intramuscular Once [place], [place]      loratadine  10 mg Oral Daily [place], [place]      predniSONE  50 mg Oral Daily [place], [place]          Today, Patient Was Seen By: KAREN Steele    **Please Note: This note may have been constructed using a voice recognition system  **

## 2022-06-24 PROCEDURE — 99232 SBSQ HOSP IP/OBS MODERATE 35: CPT | Performed by: NURSE PRACTITIONER

## 2022-06-24 RX ORDER — QUETIAPINE FUMARATE 25 MG/1
25 TABLET, FILM COATED ORAL 2 TIMES DAILY
Status: DISCONTINUED | OUTPATIENT
Start: 2022-06-24 | End: 2022-06-27 | Stop reason: HOSPADM

## 2022-06-24 RX ORDER — LANOLIN ALCOHOL/MO/W.PET/CERES
6 CREAM (GRAM) TOPICAL
Status: DISCONTINUED | OUTPATIENT
Start: 2022-06-24 | End: 2022-06-27 | Stop reason: HOSPADM

## 2022-06-24 RX ORDER — OLANZAPINE 10 MG/1
3 INJECTION, POWDER, LYOPHILIZED, FOR SOLUTION INTRAMUSCULAR ONCE
Status: DISCONTINUED | OUTPATIENT
Start: 2022-06-24 | End: 2022-06-24

## 2022-06-24 RX ADMIN — QUETIAPINE FUMARATE 25 MG: 25 TABLET ORAL at 18:07

## 2022-06-24 RX ADMIN — FAMOTIDINE 20 MG: 20 TABLET ORAL at 11:05

## 2022-06-24 RX ADMIN — LORATADINE 10 MG: 10 TABLET ORAL at 11:05

## 2022-06-24 RX ADMIN — QUETIAPINE FUMARATE 25 MG: 25 TABLET ORAL at 13:01

## 2022-06-24 RX ADMIN — Medication 6 MG: at 21:51

## 2022-06-24 RX ADMIN — ENOXAPARIN SODIUM 40 MG: 40 INJECTION SUBCUTANEOUS at 11:12

## 2022-06-24 RX ADMIN — CEFAZOLIN SODIUM 2000 MG: 2 SOLUTION INTRAVENOUS at 19:15

## 2022-06-24 RX ADMIN — FAMOTIDINE 20 MG: 20 TABLET ORAL at 17:58

## 2022-06-24 RX ADMIN — CEFAZOLIN SODIUM 2000 MG: 2 SOLUTION INTRAVENOUS at 04:00

## 2022-06-24 RX ADMIN — CEFAZOLIN SODIUM 2000 MG: 2 SOLUTION INTRAVENOUS at 10:43

## 2022-06-24 RX ADMIN — PREDNISONE 50 MG: 20 TABLET ORAL at 11:04

## 2022-06-24 NOTE — CASE MANAGEMENT
Case Management Discharge Planning Note    Patient name Liang Griffith  Location /-21 MRN [id number]  : 1940 Date 2022       Current Admission Date: 2022  Current Admission Diagnosis:Cellulitis of right lower extremity   Patient Active Problem List    Diagnosis Date Noted    Body mass index (BMI) of 19 or less in adult 2022    Cellulitis of right lower extremity 2022    Contact dermatitis 2022    Dementia ([facility] [address] )     Patient is Mandaeism 2021    Mild protein-calorie malnutrition ([facility] [address] )  2021    Chronic venous hypertension (idiopathic) with ulcer of right lower extremity ([facility] [address] ) 2021      LOS (days): 3  Geometric Mean LOS (GMLOS) (days): 3 20  Days to GMLOS:0 5     OBJECTIVE:  Risk of Unplanned Readmission Score: 14 4         Current admission status: Inpatient   Preferred Pharmacy:   [address] [facility], [address]  [address] [address]  Phone: [phone] Fax: [phone]    Primary Care Provider: Rossy Bateman DO    Primary Insurance: MEDICARE  Secondary Insurance: AARP    DISCHARGE DETAILS:    Discharge planning discussed with[de-identified] patient's donald Santos via phone  Freedom of Choice: Yes  Comments - Freedom of Choice: CM informed by [facility] that patient is responding well to her current IV Abx and is anticipated for dc in 24-48 hrs  CM called donald Santos to review all [place] [address] options and she'd like to accept care with [facility]  She asked about resources in the event patient is no longer safe at home  CM encouraged her to reach out to either [facility] or [facility] sooner rather than later, just so they have the knowledge, and she agreed to do so and thanked CM for the suggestion  CM reviewed [facility] via phone and Rebekah Santos is hopeful for dc this weekend    CM contacted family/caregiver?: Yes (donald Santos via phone)  Were Treatment Team discharge recommendations reviewed with patient/caregiver?: Yes  Did patient/caregiver verbalize understanding of patient care needs?: Yes  Were patient/caregiver advised of the risks associated with not following Treatment Team discharge recommendations?: Yes    Contacts  Patient Contacts: donald Warner  Relationship to Patient[de-identified] Family  Contact Method: Phone  Phone Number: [phone]  Reason/Outcome: Continuity of Care, Referral, Discharge [address] [place] [address] Agency Name[de-identified] [facility]    Treatment Team Recommendation: Home with [address] [place] [address]  Discharge Destination Plan[de-identified] Home with Hiral at Discharge : Family        IMM Given (Date):: 06/24/22  IMM Given to[de-identified] Family (verbally reviewed with donald Warner via phone)

## 2022-06-24 NOTE — QUICK NOTE
Notified by RN patient woke up and is currently confused and starting to get agitated despite multiple verbal redirections from RN/staff and family in setting of dementia and infection  Patient attempting to leave  Vital signs stable,     Due to acute agitation, patient unable to be verbally redirected to avoid self-harm, high fall risk, and interfering with medical treatment, and potential risk of harm to staff added a dose IM Zyprexa 3 mg for now  RN reports difficult to redirect back to bed  discussed with RN if safety is compromised can call control team to safely redirect patient back in the bed    If patient still highly agitated/not redirectable when back in bed and still concern for safety can attempt restraints, but would like to hold off on restraints as much as possible and attempt delirium precautions and see if Zyprexa helps 1st

## 2022-06-24 NOTE — PROGRESS NOTES
[address]  Progress Note Evelyn Umbarger 1940, 78 y o  female MRN: [id number]  Unit/Bed#: -Kana Encounter: [id number]  Primary Care Provider: Yesy Mccord DO   Date and time admitted to hospital: 6/21/2022  5:16 PM    * Cellulitis of right lower extremity  Assessment & Plan  Patient with history of dementia, history obtained from niece in room  Reports chronic RLE wound and redness and follows with wound care as an outpatient  She has received several courses of p o  Antibiotics over the years  Family has been utilizing anti fungal cream is treatment as of late  Over the past few days the right lower extremity has had worsening redness and began to have clear fluid drainage  · Reports chronic RLE wound and redness  · Large erythematous rash on the anterior right shin, with smaller erythematous patch on the right calf  · Wound care following, some drainage noted  · XR RLE  no evidence of osteomyelitis  · Continue Ancef  · Blood cultures negative for 48 hours    Body mass index (BMI) of 19 or less in adult  Assessment & Plan  Patient is below normal BMI  There is no nutritionist in this hospital to determine the definitive and or presumptive diagnosis  Will consider under weight in the setting of dementia and decreased appetite     Nutritional supplements    Dementia ([facility] [address] )  Assessment & Plan  · Delirum precautions   · One-to-one at bedside for safety    Contact dermatitis  Assessment & Plan  · Family reports on/off itchy rash over the patients back, torso, and left lower extremity  · Reports being told was contact dermatitis and has been utilizing steroid cream at home with on/off improvement  · Continue PO prednisone,pepcid, claritin, and prn benadryl  · Encouraged outpatient Dermatology follow-up    Patient is Harpreet  · History noted         VTE Pharmacologic Prophylaxis: VTE Score: 4 Moderate Risk (Score 3-4) - Pharmacological DVT Prophylaxis Ordered: enoxaparin (Lovenox)  Patient Centered Rounds: I performed bedside rounds with nursing staff today  Discussions with Specialists or Other Care Team Provider:  Reviewed notes    Education and Discussions with Family / Patient:  Discussed with patient family    Time Spent for Care: 20 minutes  More than 50% of total time spent on counseling and coordination of care as described above  Current Length of Stay: 3 day(s)  Current Patient Status: Inpatient   Certification Statement: The patient will continue to require additional inpatient hospital stay due to IV antibiotics and wound care  Discharge Plan: Anticipate discharge in 24-48 hrs to prior assisted or independent living facility  Code Status: Level 1 - Full Code    Subjective:   Resting comfortably in the chair family at bedside significant improvement of redness and drainage of her wound does get increasingly agitated throughout the day will trial some Seroquel today    Objective:     Vitals:   Temp (24hrs), Av 5 °F (36 4 °C), Min:97 5 °F (36 4 °C), Max:97 5 °F (36 4 °C)    Temp:  [97 5 °F (36 4 °C)] 97 5 °F (36 4 °C)  HR:  [74] 74  BP: (100)/(52) 100/52  SpO2:  [91 %] 91 %  There is no height or weight on file to calculate BMI  Input and Output Summary (last 24 hours): Intake/Output Summary (Last 24 hours) at 2022 1038  Last data filed at 2022 1017  Gross per 24 hour   Intake 480 ml   Output 800 ml   Net -320 ml       Physical Exam:   Physical Exam  Vitals and nursing note reviewed  Constitutional:       General: She is not in acute distress  Appearance: She is ill-appearing  Cardiovascular:      Rate and Rhythm: Normal rate  Pulmonary:      Effort: No respiratory distress  Abdominal:      Palpations: Abdomen is soft  Musculoskeletal:         General: Swelling and tenderness present  Skin:     Findings: Erythema present  Neurological:      Mental Status: She is alert  Mental status is at baseline  Psychiatric:         Mood and Affect: Mood normal          Additional Data:     Labs:  Results from last 7 days   Lab Units 06/23/22  0639   WBC Thousand/uL 9 31   HEMOGLOBIN g/dL 11 4*   HEMATOCRIT % 35 2   PLATELETS Thousands/uL 154   NEUTROS PCT % 76*   LYMPHS PCT % 14   MONOS PCT % 9   EOS PCT % 1     Results from last 7 days   Lab Units 06/23/22  0639   SODIUM mmol/L 147*   POTASSIUM mmol/L 4 0   CHLORIDE mmol/L 113*   CO2 mmol/L 25   BUN mg/dL 34*   CREATININE mg/dL 0 66   ANION GAP mmol/L 9   CALCIUM mg/dL 9 2   ALBUMIN g/dL 3 2*   TOTAL BILIRUBIN mg/dL 0 62   ALK PHOS U/L 85   ALT U/L 19   AST U/L 26   GLUCOSE RANDOM mg/dL 101     Results from last 7 days   Lab Units 06/21/22  1841   INR  1 01             Results from last 7 days   Lab Units 06/21/22  1841   LACTIC ACID mmol/L 0 9   PROCALCITONIN ng/ml <0 05       Lines/Drains:  Invasive Devices  Report    Peripheral Intravenous Line  Duration           Peripheral IV 06/22/22 Right;Ventral (anterior) Forearm 1 day                      Imaging: No pertinent imaging reviewed  Recent Cultures (last 7 days):   Results from last 7 days   Lab Units 06/21/22  1844 06/21/22  1841   BLOOD CULTURE  No Growth at 48 hrs  No Growth at 48 hrs         Last 24 Hours Medication List:   Current Facility-Administered Medications   Medication Dose Route Frequency Provider Last Rate    acetaminophen  650 mg Oral Q6H PRN Nava Navarro PA-C      cefazolin  2,000 mg Intravenous Q8H LyndaKAREN Cuello 2,000 mg (06/24/22 0400)    diphenhydrAMINE  25 mg Intravenous Q6H PRN Nava Navarro PA-C      enoxaparin  40 mg Subcutaneous Daily [place], [place]      famotidine  20 mg Oral BID [place], [place]      haloperidol lactate  2 mg Intramuscular Once [place], [place]      loratadine  10 mg Oral Daily [place], [place]      OLANZapine  3 mg Intramuscular Once Angie Peterson PA-C      predniSONE  50 mg Oral Daily [place], [place] Today, Patient Was Seen By: KAREN Nunes    **Please Note: This note may have been constructed using a voice recognition system  **

## 2022-06-25 PROCEDURE — 99232 SBSQ HOSP IP/OBS MODERATE 35: CPT | Performed by: NURSE PRACTITIONER

## 2022-06-25 RX ORDER — ALPRAZOLAM 0.25 MG/1
0.25 TABLET ORAL
Status: DISCONTINUED | OUTPATIENT
Start: 2022-06-25 | End: 2022-06-27 | Stop reason: HOSPADM

## 2022-06-25 RX ADMIN — CEFAZOLIN SODIUM 2000 MG: 2 SOLUTION INTRAVENOUS at 18:27

## 2022-06-25 RX ADMIN — LORATADINE 10 MG: 10 TABLET ORAL at 10:25

## 2022-06-25 RX ADMIN — QUETIAPINE FUMARATE 25 MG: 25 TABLET ORAL at 10:25

## 2022-06-25 RX ADMIN — FAMOTIDINE 20 MG: 20 TABLET ORAL at 10:25

## 2022-06-25 RX ADMIN — FAMOTIDINE 20 MG: 20 TABLET ORAL at 18:27

## 2022-06-25 RX ADMIN — PREDNISONE 50 MG: 20 TABLET ORAL at 10:25

## 2022-06-25 RX ADMIN — QUETIAPINE FUMARATE 25 MG: 25 TABLET ORAL at 18:27

## 2022-06-25 RX ADMIN — ALPRAZOLAM 0.25 MG: 0.25 TABLET ORAL at 00:16

## 2022-06-25 RX ADMIN — CEFAZOLIN SODIUM 2000 MG: 2 SOLUTION INTRAVENOUS at 10:24

## 2022-06-25 RX ADMIN — CEFAZOLIN SODIUM 2000 MG: 2 SOLUTION INTRAVENOUS at 04:58

## 2022-06-25 RX ADMIN — ENOXAPARIN SODIUM 40 MG: 40 INJECTION SUBCUTANEOUS at 10:30

## 2022-06-25 RX ADMIN — Medication 6 MG: at 21:23

## 2022-06-25 NOTE — ASSESSMENT & PLAN NOTE
Patient with history of dementia, history obtained from niece in room  Reports chronic RLE wound and redness and follows with wound care as an outpatient  She has received several courses of p o  Antibiotics over the years  Family has been utilizing anti fungal cream is treatment as of late  Over the past few days the right lower extremity has had worsening redness and began to have clear fluid drainage  · Reports chronic RLE wound and redness     · Large erythematous rash on the anterior right shin, with smaller erythematous patch on the right calf;   · Patient initially had significant improvement of redness and drainage however today redness appears to have worsened  · Wound care following, home health care ordered for home wound care  · XR RLE  no evidence of osteomyelitis  · Initially received ceftriaxone transition to Ancef with significant improvement however today worsened will discussed with infectious disease  · Blood cultures negative for >48 hours

## 2022-06-25 NOTE — PROGRESS NOTES
[facility]  Progress Note Abbe Pond 1940, 78 y o  female MRN: [id number]  Unit/Bed#: -Kana Encounter: [id number]  Primary Care Provider: Luz Schreiber DO   Date and time admitted to hospital: 6/21/2022  5:16 PM    * Cellulitis of right lower extremity  Assessment & Plan  Patient with history of dementia, history obtained from niece in room  Reports chronic RLE wound and redness and follows with wound care as an outpatient  She has received several courses of p o  Antibiotics over the years  Family has been utilizing anti fungal cream is treatment as of late  Over the past few days the right lower extremity has had worsening redness and began to have clear fluid drainage  · Reports chronic RLE wound and redness  · Large erythematous rash on the anterior right shin, with smaller erythematous patch on the right calf  · Wound care following, some drainage noted  · XR RLE  no evidence of osteomyelitis  · Continue Ancef  · Blood cultures negative for >48 hours    Body mass index (BMI) of 19 or less in adult  Assessment & Plan  Patient is below normal BMI  There is no nutritionist in this hospital to determine the definitive and or presumptive diagnosis  Will consider under weight in the setting of dementia and decreased appetite  Nutritional supplements    Dementia ([facility] [address] )  Assessment & Plan  · Delirum precautions   · One-to-one at bedside for safety    Contact dermatitis  Assessment & Plan  · Family reports on/off itchy rash over the patients back, torso, and left lower extremity  · Reports being told was contact dermatitis and has been utilizing steroid cream at home with on/off improvement  · Continue PO prednisone,pepcid, claritin, and prn benadryl  · Encouraged outpatient Dermatology follow-up        VTE Pharmacologic Prophylaxis: VTE Score: 4 Moderate Risk (Score 3-4) - Pharmacological DVT Prophylaxis Ordered: enoxaparin (Lovenox)  Patient Centered Rounds:  I performed bedside rounds with nursing staff today  Discussions with Specialists or Other Care Team Provider:  Reviewed notes    Education and Discussions with Family / Patient:  Discussed with niraymond    Time Spent for Care: 20 minutes  More than 50% of total time spent on counseling and coordination of care as described above  Current Length of Stay: 4 day(s)  Current Patient Status: Inpatient   Certification Statement: The patient will continue to require additional inpatient hospital stay due to IV antibiotics  Discharge Plan: Anticipate discharge tomorrow to home  Code Status: Level 1 - Full Code    Subjective:   Patient is resting comfortably in bed, trial of Seroquel yesterday seem to make patient was combative while she was here but she was up most of the night encourage sleep hygiene redness is continued to improve will hopefully discharge tomorrow    Objective:     Vitals:   Temp (24hrs), Av 3 °F (36 3 °C), Min:97 3 °F (36 3 °C), Max:97 3 °F (36 3 °C)    Temp:  [97 3 °F (36 3 °C)] 97 3 °F (36 3 °C)  HR:  [76] 76  Resp:  [18] 18  BP: (115-123)/(66-67) 115/67  SpO2:  [94 %] 94 %  There is no height or weight on file to calculate BMI  Input and Output Summary (last 24 hours): Intake/Output Summary (Last 24 hours) at 2022 1027  Last data filed at 2022 0946  Gross per 24 hour   Intake 720 ml   Output 800 ml   Net -80 ml       Physical Exam:   Physical Exam  Constitutional:       General: She is not in acute distress  Appearance: She is not ill-appearing  Cardiovascular:      Rate and Rhythm: Normal rate  Pulmonary:      Effort: No respiratory distress  Abdominal:      Palpations: Abdomen is soft  Musculoskeletal:         General: Normal range of motion  Skin:     Coloration: Skin is pale  Findings: Erythema present  Neurological:      Mental Status: She is alert  Mental status is at baseline     Psychiatric:         Mood and Affect: Mood normal          Additional Data:     Labs:  Results from last 7 days   Lab Units 06/23/22  0639   WBC Thousand/uL 9 31   HEMOGLOBIN g/dL 11 4*   HEMATOCRIT % 35 2   PLATELETS Thousands/uL 154   NEUTROS PCT % 76*   LYMPHS PCT % 14   MONOS PCT % 9   EOS PCT % 1     Results from last 7 days   Lab Units 06/23/22  0639   SODIUM mmol/L 147*   POTASSIUM mmol/L 4 0   CHLORIDE mmol/L 113*   CO2 mmol/L 25   BUN mg/dL 34*   CREATININE mg/dL 0 66   ANION GAP mmol/L 9   CALCIUM mg/dL 9 2   ALBUMIN g/dL 3 2*   TOTAL BILIRUBIN mg/dL 0 62   ALK PHOS U/L 85   ALT U/L 19   AST U/L 26   GLUCOSE RANDOM mg/dL 101     Results from last 7 days   Lab Units 06/21/22  1841   INR  1 01             Results from last 7 days   Lab Units 06/21/22  1841   LACTIC ACID mmol/L 0 9   PROCALCITONIN ng/ml <0 05       Lines/Drains:  Invasive Devices  Report    Peripheral Intravenous Line  Duration           Peripheral IV 06/24/22 Dorsal (posterior); Left Forearm <1 day                      Imaging: No pertinent imaging reviewed  Recent Cultures (last 7 days):   Results from last 7 days   Lab Units 06/21/22  1844 06/21/22  1841   BLOOD CULTURE  No Growth at 72 hrs  No Growth at 72 hrs         Last 24 Hours Medication List:   Current Facility-Administered Medications   Medication Dose Route Frequency Provider Last Rate    acetaminophen  650 mg Oral Q6H PRN Arslan Dias [place] MCKENNA miner      ALPRAZolam  0 25 mg Oral HS PRN KAREN Wheeler      cefazolin  2,000 mg Intravenous Q8H KAREN Daniels 2,000 mg (06/25/22 1024)    diphenhydrAMINE  25 mg Intravenous Q6H PRN Goldie Isaacs PA-C      enoxaparin  40 mg Subcutaneous Daily [place], [place]      famotidine  20 mg Oral BID [place], [place]      loratadine  10 mg Oral Daily [place], [place]      melatonin  6 mg Oral HS KAREN Daniels      predniSONE  50 mg Oral Daily Arslan [place], [place]      QUEtiapine  25 mg Oral BID KAREN Daniels          Today, Patient Was Seen By: KAREN Bailey    **Please Note: This note may have been constructed using a voice recognition system  **

## 2022-06-25 NOTE — PLAN OF CARE
Problem: PAIN - ADULT  Goal: Verbalizes/displays adequate comfort level or baseline comfort level  Description: Interventions:  - Encourage patient to monitor pain and request assistance  - Assess pain using appropriate pain scale  - Administer analgesics based on type and severity of pain and evaluate response  - Implement non-pharmacological measures as appropriate and evaluate response  - Consider cultural and social influences on pain and pain management  - Notify physician/advanced practitioner if interventions unsuccessful or patient reports new pain  Outcome: Progressing     Problem: INFECTION - ADULT  Goal: Absence or prevention of progression during hospitalization  Description: INTERVENTIONS:  - Assess and monitor for signs and symptoms of infection  - Monitor lab/diagnostic results  - Monitor all insertion sites, i e  indwelling lines, tubes, and drains  - Monitor endotracheal if appropriate and nasal secretions for changes in amount and color  - [place] appropriate cooling/warming therapies per order  - Administer medications as ordered  - Instruct and encourage patient and family to use good hand hygiene technique  - Identify and instruct in appropriate isolation precautions for identified infection/condition  Outcome: Progressing  Goal: Absence of fever/infection during neutropenic period  Description: INTERVENTIONS:  - Monitor WBC    Outcome: Progressing     Problem: SAFETY ADULT  Goal: Patient will remain free of falls  Description: INTERVENTIONS:  - Educate patient/family on patient safety including physical limitations  - Instruct patient to call for assistance with activity   - Consult OT/PT to assist with strengthening/mobility   - Keep Call bell within reach  - Keep bed low and locked with side rails adjusted as appropriate  - Keep care items and personal belongings within reach  - Initiate and maintain comfort rounds  - Make Fall Risk Sign visible to staff  - Offer Toileting every 2 Hours, in advance of need  - Initiate/Maintain Bed alarm  - Obtain necessary fall risk management equipment: Bed Alarm  - Apply yellow socks and bracelet for high fall risk patients  - Consider moving patient to room near nurses station  Outcome: Progressing  Goal: Maintain or return to baseline ADL function  Description: INTERVENTIONS:  -  Assess patient's ability to carry out ADLs; assess patient's baseline for ADL function and identify physical deficits which impact ability to perform ADLs (bathing, care of mouth/teeth, toileting, grooming, dressing, etc )  - Assess/evaluate cause of self-care deficits   - Assess range of motion  - Assess patient's mobility; develop plan if impaired  - Assess patient's need for assistive devices and provide as appropriate  - Encourage maximum independence but intervene and supervise when necessary  - Involve family in performance of ADLs  - Assess for home care needs following discharge   - Consider OT consult to assist with ADL evaluation and planning for discharge  - Provide patient education as appropriate  Outcome: Progressing  Goal: Maintains/Returns to pre admission functional level  Description: INTERVENTIONS:  - Perform BMAT or MOVE assessment daily    - Set and communicate daily mobility goal to care team and patient/family/caregiver  - Collaborate with rehabilitation services on mobility goals if consulted  - Perform Range of Motion 4 times a day  - Reposition patient every 2 hours    - Dangle patient 4 times a day  - Stand patient 4 times a day  - Ambulate patient 3 times a day  - Out of bed to chair 3 times a day   - Out of bed for meals 3 times a day  - Out of bed for toileting  - Record patient progress and toleration of activity level   Outcome: Progressing     Problem: DISCHARGE PLANNING  Goal: Discharge to home or other facility with appropriate resources  Description: INTERVENTIONS:  - Identify barriers to discharge w/patient and caregiver  - Arrange for needed discharge resources and transportation as appropriate  - Identify discharge learning needs (meds, wound care, etc )  - Arrange for interpretive services to assist at discharge as needed  - Refer to Case Management Department for coordinating discharge planning if the patient needs post-hospital services based on physician/advanced practitioner order or complex needs related to functional status, cognitive ability, or social support system  Outcome: Progressing     Problem: Knowledge Deficit  Goal: Patient/family/caregiver demonstrates understanding of disease process, treatment plan, medications, and discharge instructions  Description: Complete learning assessment and assess knowledge base  Interventions:  - Provide teaching at level of understanding  - Provide teaching via preferred learning methods  Outcome: Progressing     Problem: Potential for Falls  Goal: Patient will remain free of falls  Description: INTERVENTIONS:  - Educate patient/family on patient safety including physical limitations  - Instruct patient to call for assistance with activity   - Consult OT/PT to assist with strengthening/mobility   - Keep Call bell within reach  - Keep bed low and locked with side rails adjusted as appropriate  - Keep care items and personal belongings within reach  - Initiate and maintain comfort rounds  - Make Fall Risk Sign visible to staff  - Offer Toileting every 2 Hours, in advance of need  - Initiate/Maintain Bed alarm  - Obtain necessary fall risk management equipment: Bed Alarm  - Apply yellow socks and bracelet for high fall risk patients  - Consider moving patient to room near nurses station  Outcome: Progressing     Problem: Nutrition/Hydration-ADULT  Goal: Nutrient/Hydration intake appropriate for improving, restoring or maintaining nutritional needs  Description: Monitor and assess patient's nutrition/hydration status for malnutrition   Collaborate with interdisciplinary team and initiate plan and interventions as ordered  Monitor patient's weight and dietary intake as ordered or per policy  Utilize nutrition screening tool and intervene as necessary  Determine patient's food preferences and provide high-protein, high-caloric foods as appropriate       INTERVENTIONS:  - Monitor oral intake, urinary output, labs, and treatment plans  - Assess nutrition and hydration status and recommend course of action  - Evaluate amount of meals eaten  - Assist patient with eating if necessary   - Allow adequate time for meals  - Recommend/ encourage appropriate diets, oral nutritional supplements, and vitamin/mineral supplements  - Order, calculate, and assess calorie counts as needed  - Recommend, monitor, and adjust tube feedings and TPN/PPN based on assessed needs  - Assess need for intravenous fluids  - Provide specific nutrition/hydration education as appropriate  - Include patient/family/caregiver in decisions related to nutrition  Outcome: Progressing     Problem: SAFETY,RESTRAINT: NV/NON-SELF DESTRUCTIVE BEHAVIOR  Goal: Remains free of harm/injury (restraint for non violent/non self-detsructive behavior)  Description: INTERVENTIONS:  - Instruct patient/family regarding restraint use   - Assess and monitor physiologic and psychological status   - Provide interventions and comfort measures to meet assessed patient needs   - Identify and implement measures to help patient regain control  - Assess readiness for release of restraint   Outcome: Progressing  Goal: Returns to optimal restraint-free functioning  Description: INTERVENTIONS:  - Assess the patient's behavior and symptoms that indicate continued need for restraint  - Identify and implement measures to help patient regain control  - Assess readiness for release of restraint   Outcome: Progressing     Problem: MOBILITY - ADULT  Goal: Maintain or return to baseline ADL function  Description: INTERVENTIONS:  -  Assess patient's ability to carry out ADLs; assess patient's baseline for ADL function and identify physical deficits which impact ability to perform ADLs (bathing, care of mouth/teeth, toileting, grooming, dressing, etc )  - Assess/evaluate cause of self-care deficits   - Assess range of motion  - Assess patient's mobility; develop plan if impaired  - Assess patient's need for assistive devices and provide as appropriate  - Encourage maximum independence but intervene and supervise when necessary  - Involve family in performance of ADLs  - Assess for home care needs following discharge   - Consider OT consult to assist with ADL evaluation and planning for discharge  - Provide patient education as appropriate  Outcome: Progressing  Goal: Maintains/Returns to pre admission functional level  Description: INTERVENTIONS:  - Perform BMAT or MOVE assessment daily    - Set and communicate daily mobility goal to care team and patient/family/caregiver  - Collaborate with rehabilitation services on mobility goals if consulted  - Perform Range of Motion 4 times a day  - Reposition patient every 2 hours    - Dangle patient 4 times a day  - Stand patient 4 times a day  - Ambulate patient 3 times a day  - Out of bed to chair 3 times a day   - Out of bed for meals 3 times a day  - Out of bed for toileting  - Record patient progress and toleration of activity level   Outcome: Progressing

## 2022-06-25 NOTE — PLAN OF CARE
Problem: PAIN - ADULT  Goal: Verbalizes/displays adequate comfort level or baseline comfort level  Description: Interventions:  - Encourage patient to monitor pain and request assistance  - Assess pain using appropriate pain scale  - Administer analgesics based on type and severity of pain and evaluate response  - Implement non-pharmacological measures as appropriate and evaluate response  - Consider cultural and social influences on pain and pain management  - Notify physician/advanced practitioner if interventions unsuccessful or patient reports new pain  Outcome: Progressing     Problem: INFECTION - ADULT  Goal: Absence or prevention of progression during hospitalization  Description: INTERVENTIONS:  - Assess and monitor for signs and symptoms of infection  - Monitor lab/diagnostic results  - Monitor all insertion sites, i e  indwelling lines, tubes, and drains  - Monitor endotracheal if appropriate and nasal secretions for changes in amount and color  - [place] appropriate cooling/warming therapies per order  - Administer medications as ordered  - Instruct and encourage patient and family to use good hand hygiene technique  - Identify and instruct in appropriate isolation precautions for identified infection/condition  Outcome: Progressing  Goal: Absence of fever/infection during neutropenic period  Description: INTERVENTIONS:  - Monitor WBC    Outcome: Progressing     Problem: SAFETY ADULT  Goal: Patient will remain free of falls  Description: INTERVENTIONS:  - Educate patient/family on patient safety including physical limitations  - Instruct patient to call for assistance with activity   - Consult OT/PT to assist with strengthening/mobility   - Keep Call bell within reach  - Keep bed low and locked with side rails adjusted as appropriate  - Keep care items and personal belongings within reach  - Initiate and maintain comfort rounds  - Make Fall Risk Sign visible to staff  - Offer Toileting every 2 Hours, in advance of need  - Initiate/Maintain bed alarm  - Obtain necessary fall risk management equipment  - Apply yellow socks and bracelet for high fall risk patients  - Consider moving patient to room near nurses station  Outcome: Progressing     Problem: DISCHARGE PLANNING  Goal: Discharge to home or other facility with appropriate resources  Description: INTERVENTIONS:  - Identify barriers to discharge w/patient and caregiver  - Arrange for needed discharge resources and transportation as appropriate  - Identify discharge learning needs (meds, wound care, etc )  - Arrange for interpretive services to assist at discharge as needed  - Refer to Case Management Department for coordinating discharge planning if the patient needs post-hospital services based on physician/advanced practitioner order or complex needs related to functional status, cognitive ability, or social support system  Outcome: Progressing     Problem: Knowledge Deficit  Goal: Patient/family/caregiver demonstrates understanding of disease process, treatment plan, medications, and discharge instructions  Description: Complete learning assessment and assess knowledge base    Interventions:  - Provide teaching at level of understanding  - Provide teaching via preferred learning methods  Outcome: Progressing     Problem: SAFETY,RESTRAINT: NV/NON-SELF DESTRUCTIVE BEHAVIOR  Goal: Remains free of harm/injury (restraint for non violent/non self-detsructive behavior)  Description: INTERVENTIONS:  - Instruct patient/family regarding restraint use   - Assess and monitor physiologic and psychological status   - Provide interventions and comfort measures to meet assessed patient needs   - Identify and implement measures to help patient regain control  - Assess readiness for release of restraint   Outcome: Progressing  Goal: Returns to optimal restraint-free functioning  Description: INTERVENTIONS:  - Assess the patient's behavior and symptoms that indicate continued need for restraint  - Identify and implement measures to help patient regain control  - Assess readiness for release of restraint   Outcome: Progressing     Problem: MOBILITY - ADULT  Goal: Maintain or return to baseline ADL function  Description: INTERVENTIONS:  -  Assess patient's ability to carry out ADLs; assess patient's baseline for ADL function and identify physical deficits which impact ability to perform ADLs (bathing, care of mouth/teeth, toileting, grooming, dressing, etc )  - Assess/evaluate cause of self-care deficits   - Assess range of motion  - Assess patient's mobility; develop plan if impaired  - Assess patient's need for assistive devices and provide as appropriate  - Encourage maximum independence but intervene and supervise when necessary  - Involve family in performance of ADLs  - Assess for home care needs following discharge   - Consider OT consult to assist with ADL evaluation and planning for discharge  - Provide patient education as appropriate  Outcome: Progressing    Problem: Prexisting or High Potential for Compromised Skin Integrity  Goal: Skin integrity is maintained or improved  Description: INTERVENTIONS:  - Identify patients at risk for skin breakdown  - Assess and monitor skin integrity  - Assess and monitor nutrition and hydration status  - Monitor labs   - Assess for incontinence   - Turn and reposition patient  - Assist with mobility/ambulation  - Relieve pressure over bony prominences  - Avoid friction and shearing  - Provide appropriate hygiene as needed including keeping skin clean and dry  - Evaluate need for skin moisturizer/barrier cream  - Collaborate with interdisciplinary team   - Patient/family teaching  - Consider wound care consult   Outcome: Progressing

## 2022-06-25 NOTE — ASSESSMENT & PLAN NOTE
· Delirum precautions   · One-to-one at bedside for safety  · Started on 25 mg of Seroquel b i d   With significant behavioral improvements  · Encourage sleep hygiene  · Melatonin nightly

## 2022-06-25 NOTE — ASSESSMENT & PLAN NOTE
Patient with history of dementia, history obtained from niece in room  Reports chronic RLE wound and redness and follows with wound care as an outpatient  She has received several courses of p o  Antibiotics over the years  Family has been utilizing anti fungal cream is treatment as of late  Over the past few days the right lower extremity has had worsening redness and began to have clear fluid drainage  · Reports chronic RLE wound and redness     · Large erythematous rash on the anterior right shin, with smaller erythematous patch on the right calf  · Wound care following, some drainage noted  · XR RLE  no evidence of osteomyelitis  · Continue Ancef  · Blood cultures negative for >48 hours

## 2022-06-26 VITALS
TEMPERATURE: 97.1 F | SYSTOLIC BLOOD PRESSURE: 140 MMHG | HEART RATE: 86 BPM | RESPIRATION RATE: 16 BRPM | DIASTOLIC BLOOD PRESSURE: 77 MMHG | OXYGEN SATURATION: 96 %

## 2022-06-26 LAB
ANION GAP SERPL CALCULATED.3IONS-SCNC: 8 MMOL/L (ref 4–13)
BACTERIA BLD CULT: NORMAL
BACTERIA BLD CULT: NORMAL
BUN SERPL-MCNC: 30 MG/DL (ref 5–25)
CALCIUM SERPL-MCNC: 9.4 MG/DL (ref 8.3–10.1)
CHLORIDE SERPL-SCNC: 105 MMOL/L (ref 100–108)
CO2 SERPL-SCNC: 30 MMOL/L (ref 21–32)
CREAT SERPL-MCNC: 0.71 MG/DL (ref 0.6–1.3)
ERYTHROCYTE [DISTWIDTH] IN BLOOD BY AUTOMATED COUNT: 14.4 % (ref 11.6–15.1)
GFR SERPL CREATININE-BSD FRML MDRD: 78 ML/MIN/1.73SQ M
GLUCOSE SERPL-MCNC: 105 MG/DL (ref 65–140)
HCT VFR BLD AUTO: 40.8 % (ref 34.8–46.1)
HGB BLD-MCNC: 13.3 G/DL (ref 11.5–15.4)
MCH RBC QN AUTO: 33.9 PG (ref 26.8–34.3)
MCHC RBC AUTO-ENTMCNC: 32.6 G/DL (ref 31.4–37.4)
MCV RBC AUTO: 104 FL (ref 82–98)
PLATELET # BLD AUTO: 199 THOUSANDS/UL (ref 149–390)
PMV BLD AUTO: 10.7 FL (ref 8.9–12.7)
POTASSIUM SERPL-SCNC: 4 MMOL/L (ref 3.5–5.3)
RBC # BLD AUTO: 3.92 MILLION/UL (ref 3.81–5.12)
SODIUM SERPL-SCNC: 143 MMOL/L (ref 136–145)
WBC # BLD AUTO: 7.86 THOUSAND/UL (ref 4.31–10.16)

## 2022-06-26 PROCEDURE — 99232 SBSQ HOSP IP/OBS MODERATE 35: CPT | Performed by: NURSE PRACTITIONER

## 2022-06-26 PROCEDURE — 85027 COMPLETE CBC AUTOMATED: CPT | Performed by: NURSE PRACTITIONER

## 2022-06-26 PROCEDURE — 80048 BASIC METABOLIC PNL TOTAL CA: CPT | Performed by: NURSE PRACTITIONER

## 2022-06-26 RX ORDER — FUROSEMIDE 10 MG/ML
40 INJECTION INTRAMUSCULAR; INTRAVENOUS ONCE
Status: COMPLETED | OUTPATIENT
Start: 2022-06-26 | End: 2022-06-26

## 2022-06-26 RX ORDER — PREDNISONE 20 MG/1
40 TABLET ORAL DAILY
Status: DISCONTINUED | OUTPATIENT
Start: 2022-06-26 | End: 2022-06-26

## 2022-06-26 RX ORDER — SACCHAROMYCES BOULARDII 250 MG
250 CAPSULE ORAL 2 TIMES DAILY
Status: DISCONTINUED | OUTPATIENT
Start: 2022-06-26 | End: 2022-06-27 | Stop reason: HOSPADM

## 2022-06-26 RX ORDER — PREDNISONE 20 MG/1
40 TABLET ORAL DAILY
Status: DISCONTINUED | OUTPATIENT
Start: 2022-06-27 | End: 2022-06-27 | Stop reason: HOSPADM

## 2022-06-26 RX ADMIN — PREDNISONE 50 MG: 20 TABLET ORAL at 09:47

## 2022-06-26 RX ADMIN — DIPHENHYDRAMINE HYDROCHLORIDE 25 MG: 50 INJECTION, SOLUTION INTRAMUSCULAR; INTRAVENOUS at 23:11

## 2022-06-26 RX ADMIN — QUETIAPINE FUMARATE 25 MG: 25 TABLET ORAL at 17:42

## 2022-06-26 RX ADMIN — QUETIAPINE FUMARATE 25 MG: 25 TABLET ORAL at 09:47

## 2022-06-26 RX ADMIN — CEFAZOLIN SODIUM 2000 MG: 2 SOLUTION INTRAVENOUS at 09:48

## 2022-06-26 RX ADMIN — FUROSEMIDE 40 MG: 10 INJECTION, SOLUTION INTRAMUSCULAR; INTRAVENOUS at 15:34

## 2022-06-26 RX ADMIN — Medication 250 MG: at 17:42

## 2022-06-26 RX ADMIN — FAMOTIDINE 20 MG: 20 TABLET ORAL at 09:47

## 2022-06-26 RX ADMIN — ENOXAPARIN SODIUM 40 MG: 40 INJECTION SUBCUTANEOUS at 09:48

## 2022-06-26 RX ADMIN — LORATADINE 10 MG: 10 TABLET ORAL at 09:47

## 2022-06-26 RX ADMIN — Medication 6 MG: at 23:11

## 2022-06-26 RX ADMIN — CEFAZOLIN SODIUM 2000 MG: 2 SOLUTION INTRAVENOUS at 03:44

## 2022-06-26 NOTE — PROGRESS NOTES
[address] [facility]  Progress Note [place]c [employer] 1940, 78 y o  female MRN: [id number]  Unit/Bed#: -Kana Encounter: [id number]  Primary Care Provider: Vilma Jay DO   Date and time admitted to hospital: 6/21/2022  5:16 PM    * Cellulitis of right lower extremity  Assessment & Plan  Patient with history of dementia, history obtained from niece in room  Reports chronic RLE wound and redness and follows with wound care as an outpatient  She has received several courses of p o  Antibiotics over the years  Family has been utilizing anti fungal cream is treatment as of late  Over the past few days the right lower extremity has had worsening redness and began to have clear fluid drainage  · Reports chronic RLE wound and redness  · Large erythematous rash on the anterior right shin, with smaller erythematous patch on the right calf;   · Patient initially had significant improvement of redness and drainage however today redness appears to have worsened  · Wound care following, home health care ordered for home wound care  · XR RLE  no evidence of osteomyelitis  · Initially received ceftriaxone transition to Ancef with significant improvement however today worsened will discussed with infectious disease  · Blood cultures negative for >48 hours    Body mass index (BMI) of 19 or less in adult  Assessment & Plan  Patient is below normal BMI  There is no nutritionist in this hospital to determine the definitive and or presumptive diagnosis  Will consider under weight in the setting of dementia and decreased appetite  Nutritional supplements    Dementia ([facility] [address] )  Assessment & Plan  · Delirum precautions   · One-to-one at bedside for safety  · Started on 25 mg of Seroquel b i d   With significant behavioral improvements  · Encourage sleep hygiene  · Melatonin nightly    Contact dermatitis  Assessment & Plan  · Family reports on/off itchy rash over the patients back, torso, and left lower extremity  · Reports being told was contact dermatitis and has been utilizing steroid cream at home with on/off improvement  · Continue PO prednisone,pepcid, claritin, and prn benadryl  · Encouraged outpatient Dermatology follow-up        VTE Pharmacologic Prophylaxis: VTE Score: 4 Moderate Risk (Score 3-4) - Pharmacological DVT Prophylaxis Ordered: enoxaparin (Lovenox)  Patient Centered Rounds: I performed bedside rounds with nursing staff today  Discussions with Specialists or Other Care Team Provider:  Reviewed notes    Education and Discussions with Family / Patient:  Discussed with family    Time Spent for Care: 20 minutes  More than 50% of total time spent on counseling and coordination of care as described above  Current Length of Stay: 5 day(s)  Current Patient Status: Inpatient   Certification Statement: The patient will continue to require additional inpatient hospital stay due to IV antibiotics  Discharge Plan: Anticipate discharge in 24-48 hrs to home  Code Status: Level 1 - Full Code    Subjective:   Out of bed in chair family at bedside does not appear in any acute distress offers no complaints at this time    Objective:     Vitals:   Temp (24hrs), Av 7 °F (36 5 °C), Min:97 4 °F (36 3 °C), Max:98 °F (36 7 °C)    Temp:  [97 4 °F (36 3 °C)-98 °F (36 7 °C)] 98 °F (36 7 °C)  HR:  [65] 65  Resp:  [16] 16  BP: (102-116)/(56-65) 116/65  SpO2:  [95 %] 95 %  There is no height or weight on file to calculate BMI  Input and Output Summary (last 24 hours): Intake/Output Summary (Last 24 hours) at 2022 1103  Last data filed at 2022 0300  Gross per 24 hour   Intake 270 ml   Output 900 ml   Net -630 ml       Physical Exam:   Physical Exam  Vitals reviewed  Constitutional:       General: She is not in acute distress  Appearance: She is ill-appearing  Cardiovascular:      Rate and Rhythm: Normal rate     Pulmonary:      Effort: Pulmonary effort is normal    Abdominal: Palpations: Abdomen is soft  Skin:     General: Skin is warm  Neurological:      Mental Status: She is alert  Mental status is at baseline  Psychiatric:         Mood and Affect: Mood normal      Additional Data:     Labs:  Results from last 7 days   Lab Units 06/26/22  0550 06/23/22  0639   WBC Thousand/uL 7 86 9 31   HEMOGLOBIN g/dL 13 3 11 4*   HEMATOCRIT % 40 8 35 2   PLATELETS Thousands/uL 199 154   NEUTROS PCT %  --  76*   LYMPHS PCT %  --  14   MONOS PCT %  --  9   EOS PCT %  --  1     Results from last 7 days   Lab Units 06/26/22  0550 06/23/22  0639   SODIUM mmol/L 143 147*   POTASSIUM mmol/L 4 0 4 0   CHLORIDE mmol/L 105 113*   CO2 mmol/L 30 25   BUN mg/dL 30* 34*   CREATININE mg/dL 0 71 0 66   ANION GAP mmol/L 8 9   CALCIUM mg/dL 9 4 9 2   ALBUMIN g/dL  --  3 2*   TOTAL BILIRUBIN mg/dL  --  0 62   ALK PHOS U/L  --  85   ALT U/L  --  19   AST U/L  --  26   GLUCOSE RANDOM mg/dL 105 101     Results from last 7 days   Lab Units 06/21/22  1841   INR  1 01             Results from last 7 days   Lab Units 06/21/22  1841   LACTIC ACID mmol/L 0 9   PROCALCITONIN ng/ml <0 05       Lines/Drains:  Invasive Devices  Report    Peripheral Intravenous Line  Duration           Peripheral IV 06/24/22 Dorsal (posterior); Left Forearm 1 day                      Imaging: No pertinent imaging reviewed  Recent Cultures (last 7 days):   Results from last 7 days   Lab Units 06/21/22  1844 06/21/22  1841   BLOOD CULTURE  No Growth After 4 Days  No Growth After 4 Days         Last 24 Hours Medication List:   Current Facility-Administered Medications   Medication Dose Route Frequency Provider Last Rate    acetaminophen  650 mg Oral Q6H PRN Ladona MAGDIEL Soto-C      ALPRAZolam  0 25 mg Oral HS PRN Rue Million, CRNP      cefazolin  2,000 mg Intravenous Q8H Werner Allan, CRNP 2,000 mg (06/26/22 0948)    diphenhydrAMINE  25 mg Intravenous Q6H PRN Ladona Charles PA-C      enoxaparin  40 mg Subcutaneous Daily Ac Mitchell PA-C      famotidine  20 mg Oral BID Claudia Sutton, [place]      loratadine  10 mg Oral Daily [place], [place]      melatonin  6 mg Oral HS KAREN Vaca      predniSONE  50 mg Oral Daily Kavin Mukherjee      QUEtiapine  25 mg Oral BID KAREN Vaca          Today, Patient Was Seen By: KAREN Vaca    **Please Note: This note may have been constructed using a voice recognition system  **

## 2022-06-27 ENCOUNTER — TRANSITIONAL CARE MANAGEMENT (OUTPATIENT)
Dept: FAMILY MEDICINE CLINIC | Facility: CLINIC | Age: 82
End: 2022-06-27

## 2022-06-27 LAB
ANION GAP SERPL CALCULATED.3IONS-SCNC: 9 MMOL/L (ref 4–13)
BUN SERPL-MCNC: 30 MG/DL (ref 5–25)
CALCIUM SERPL-MCNC: 9.7 MG/DL (ref 8.3–10.1)
CHLORIDE SERPL-SCNC: 105 MMOL/L (ref 100–108)
CO2 SERPL-SCNC: 30 MMOL/L (ref 21–32)
CREAT SERPL-MCNC: 0.74 MG/DL (ref 0.6–1.3)
ERYTHROCYTE [DISTWIDTH] IN BLOOD BY AUTOMATED COUNT: 14.4 % (ref 11.6–15.1)
GFR SERPL CREATININE-BSD FRML MDRD: 74 ML/MIN/1.73SQ M
GLUCOSE SERPL-MCNC: 91 MG/DL (ref 65–140)
HCT VFR BLD AUTO: 39.8 % (ref 34.8–46.1)
HGB BLD-MCNC: 13.2 G/DL (ref 11.5–15.4)
MCH RBC QN AUTO: 34.2 PG (ref 26.8–34.3)
MCHC RBC AUTO-ENTMCNC: 33.2 G/DL (ref 31.4–37.4)
MCV RBC AUTO: 103 FL (ref 82–98)
PLATELET # BLD AUTO: 203 THOUSANDS/UL (ref 149–390)
PMV BLD AUTO: 10.4 FL (ref 8.9–12.7)
POTASSIUM SERPL-SCNC: 3.7 MMOL/L (ref 3.5–5.3)
RBC # BLD AUTO: 3.86 MILLION/UL (ref 3.81–5.12)
SODIUM SERPL-SCNC: 144 MMOL/L (ref 136–145)
WBC # BLD AUTO: 7.86 THOUSAND/UL (ref 4.31–10.16)

## 2022-06-27 PROCEDURE — 80048 BASIC METABOLIC PNL TOTAL CA: CPT | Performed by: NURSE PRACTITIONER

## 2022-06-27 PROCEDURE — 99239 HOSP IP/OBS DSCHRG MGMT >30: CPT | Performed by: PHYSICIAN ASSISTANT

## 2022-06-27 PROCEDURE — 85027 COMPLETE CBC AUTOMATED: CPT | Performed by: NURSE PRACTITIONER

## 2022-06-27 RX ORDER — LORATADINE 10 MG/1
10 TABLET ORAL DAILY
Qty: 15 TABLET | Refills: 0 | Status: SHIPPED | OUTPATIENT
Start: 2022-06-28 | End: 2022-07-05

## 2022-06-27 RX ORDER — PREDNISONE 10 MG/1
20 TABLET ORAL DAILY
Qty: 6 TABLET | Refills: 0 | Status: SHIPPED | OUTPATIENT
Start: 2022-06-29 | End: 2022-07-02

## 2022-06-27 RX ADMIN — ENOXAPARIN SODIUM 40 MG: 40 INJECTION SUBCUTANEOUS at 09:37

## 2022-06-27 RX ADMIN — QUETIAPINE FUMARATE 25 MG: 25 TABLET ORAL at 09:36

## 2022-06-27 RX ADMIN — Medication 250 MG: at 09:36

## 2022-06-27 RX ADMIN — LORATADINE 10 MG: 10 TABLET ORAL at 09:36

## 2022-06-27 RX ADMIN — PREDNISONE 40 MG: 20 TABLET ORAL at 09:36

## 2022-06-27 NOTE — CASE MANAGEMENT
Case Management Progress Note    Patient name Teresita Weaver  Location /-73 MRN [id number]  : 1940 Date 2022       LOS (days): 6  Geometric Mean LOS (GMLOS) (days): 3 20  Days to GMLOS:-2 5        OBJECTIVE:        Current admission status: Inpatient  Preferred Pharmacy:   [address] [address]  Phone: [phone] Fax: [phone]    Primary Care Provider: Tirso Mcleod DO    Primary Insurance: MEDICARE  Secondary Insurance: A[employer]    PROGRESS NOTE:  CM informed by Chana Vasques that patient is medically cleared for dc home today  She has been set up with [facility] for SN only, as discussed with family last week  KARISSA did talk to family about the benefit of both OT and HHA and family is now interested in same  SLIM included all 3 disciplines in home health orders and CM sent message to [facility] via [employer] to ensure they can provide for same

## 2022-06-27 NOTE — WOUND OSTOMY CARE
Progress Note - Wound   Chencho Clink 78 y o  female MRN: [id number]  Unit/Bed#: -01 Encounter: [id number]       Assessment:   Patient admitted due to cellulitis of right lower extremity  History of Dementia  Wound care follow-up for right lower extremity wounds  Patient agreeable to assessment, alert and oriented to self, continent of bowel and bladder, stands independently for assessment, heels elevated, is an standby assist with care       1  Bilateral sacrum, buttock, and heels- skin is dry, intact, blanchable pink skin      2  Right lower extremity- On assessment wounds have resolved, skin is dry, intact, scaly, with mild erythema/pink skin, no warmth, no induration      Educated patient on importance of frequent offloading of pressure via turning, repositioning, and weight-shifting  Verbalized understanding of plan of care      No induration, fluctuance, odor, warmth, or purulence noted to the above noted wounds  New dressings applied  Patient tolerated well, denies pain to the wounds  Primary nurse aware of plan of care  See flow sheets for more detailed assessment findings       Skin care plans:  1-Hydraguard to bilateral sacrum, buttock and heels BID and PRN  2-Elevate heels to offload pressure  3-Ehob cushion in chair when out of bed  4-Moisturize skin daily with skin nourishing cream   5-Turn/reposition q2h for pressure re-distribution on skin  6-Right lower extremity-Apply Moisturizing Skin Nourishing cream daily, can cover with dry dressing to prevent patient from scratching leg  7-Wound care will sign off at this time  Wound 01/21/22 Venous Ulcer Pretibial Proximal;Right (Active)   Wound Image   06/27/22 0902   Wound Description Dry; Intact; Epithelialization;Pink 06/27/22 0902   Vandana-wound Assessment Dry; Intact;Scaly;Pink 06/27/22 0902   Wound Length (cm) 0 cm 06/27/22 0902   Wound Width (cm) 0 cm 06/27/22 0902   Wound Depth (cm) 0 cm 06/27/22 0902   Wound Surface Area (cm^2) 0 cm^2 06/27/22 0902   Wound Volume (cm^3) 0 cm^3 06/27/22 0902   Calculated Wound Volume (cm^3) 0 cm^3 06/27/22 0902   Change in Wound Size % 100 06/27/22 0902   Tunneling 0 cm 06/27/22 0902   Undermining 0 06/27/22 0902   Drainage Amount None 06/27/22 0902   Non-staged Wound Description Not applicable 68/78/42 2546   Treatments Cleansed;Site care 06/27/22 0902   Dressing Moisture barrier 06/27/22 0902   Wound packed? No 06/27/22 0902   Dressing Changed Changed 06/27/22 0902   Patient Tolerance Tolerated well 06/27/22 0902   Dressing Status Clean;Dry; Intact 06/27/22 0902       Wound 06/22/22 Pretibial Distal;Right;Lateral (Active)   Wound Image   06/27/22 0902   Wound Description Dry; Intact;Pink;Epithelialization 06/27/22 0902   Vandana-wound Assessment Dry; Intact; Pink 06/27/22 0902   Wound Length (cm) 0 cm 06/27/22 0902   Wound Width (cm) 0 cm 06/27/22 0902   Wound Depth (cm) 0 cm 06/27/22 0902   Wound Surface Area (cm^2) 0 cm^2 06/27/22 0902   Wound Volume (cm^3) 0 cm^3 06/27/22 0902   Calculated Wound Volume (cm^3) 0 cm^3 06/27/22 0902   Change in Wound Size % 100 06/27/22 0902   Tunneling 0 cm 06/27/22 0902   Undermining 0 06/27/22 0902   Drainage Amount None 06/27/22 0902   Non-staged Wound Description Not applicable 93/95/42 4094   Treatments Cleansed;Site care 06/27/22 0902   Dressing Moisture barrier 06/27/22 0902   Wound packed?  No 06/27/22 0902   Dressing Changed New 06/27/22 0902   Patient Tolerance Tolerated well 06/27/22 0902   Dressing Status       Wound Care will sign off  Call or Tigertext with any questions    Corby BENTONN RN [facility]  Wound care

## 2022-06-27 NOTE — DISCHARGE SUMMARY
[address] [facility]  Discharge- Dank Garcia 1940, 78 y o  female MRN: [id number]  Unit/Bed#: -Kana Encounter: [id number]  Primary Care Provider: Henri Venegas, DO   Date and time admitted to hospital: 6/21/2022  5:16 PM    * Cellulitis of right lower extremity  Assessment & Plan  Patient with history of dementia, history obtained from niece in room  Reports chronic RLE wound and redness and follows with wound care as an outpatient  She has received several courses of p o  Antibiotics over the years  Family has been utilizing anti fungal cream is treatment as of late  Over the past few days the right lower extremity has had worsening redness and began to have clear fluid drainage  · Reports chronic RLE wound and redness  · Large erythematous rash on the anterior right shin, with smaller erythematous patch on the right calf;   · Patient initially had significant improvement of redness and drainage however today redness appears to have worsened  · Wound care following, home health care ordered for home wound care  · XR RLE  no evidence of osteomyelitis  · Initially received ceftriaxone transition to Ancef with significant improvement however today worsened will discussed with infectious disease  · Blood cultures negative for >48 hours    Contact dermatitis  Assessment & Plan  · Family reports on/off itchy rash over the patients back, torso, and left lower extremity  · Reports being told was contact dermatitis and has been utilizing steroid cream at home with on/off improvement  · Continue PO prednisone,pepcid, claritin, and prn benadryl  · Encouraged outpatient Dermatology follow-up    Body mass index (BMI) of 19 or less in adult  Assessment & Plan  · Patient is below normal BMI  · There is no nutritionist in this hospital to determine the definitive and or presumptive diagnosis  · Will consider under weight in the setting of dementia and decreased appetite     · Nutritional supplements    Dementia ([facility] [address] )  Assessment & Plan  · Delirum precautions   · One-to-one at bedside for safety  · Started on 25 mg of Seroquel b i d  With significant behavioral improvements  · Encourage sleep hygiene  · Melatonin nightly      Medical Problems             Resolved Problems  Date Reviewed: 6/27/2022   None               Discharging Physician / Practitioner: Katherin Rodas PA-C  PCP: Precious sAhley DO  Admission Date:   Admission Orders (From admission, onward)     Ordered        06/21/22 2108  Inpatient Admission  Once                      Discharge Date: 06/27/22    Consultations During Hospital Stay:  · Wound care nurse     Procedures Performed:   · None     Significant Findings / Test Results:   XR tibia fibula 2 views RIGHT   Final Result by Hany Wood MD (06/22 7130)      No acute osseous abnormality  No radiographic evidence of osteomyelitis  Workstation performed: [id number]              Incidental Findings:   · None      Test Results Pending at Discharge (will require follow up): · None      Outpatient Tests Requested:  · None     Complications:  None     Reason for Admission: rule out cellulitis     Hospital Course:   Bina Ahuja is a 78 y o  female patient who originally presented to the hospital on 6/21/2022 due to right lower extremity erythema, edema, concern for cellulitis  Patient has a past medical history significant for dementia, 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 but with family living immediately next door  Patient has a history of right lower extremity wound/cellulitis/stasis dermatitis  Patient apparently had been following with Wound Care and trialed oral antibiotics as well as topical antifungal without complete resolution      Ultimately, suspect that patient's etiology for the right lower extremity edema and erythema to be chronic venous stasis dermatitis, without obvious signs of acute infection including laboratory workup and imaging  Patient has been monitored off antibiotics > 24 hours with improvement  Notably, patient did have worsening of the area while still on antibiotics, however now improved without use  Patient does have the underlying dementia, did have some increased confusion while here, along with day/night reversal   It sounds like patient probably has day/night reversal at home as well  Patient would benefit from continued home health care including wound nurse, OT and aide  Please see above list of diagnoses and related plan for additional information  Condition at Discharge: fair    Discharge Day Visit / Exam:   Subjective:  Patient seen this morning, sleeping soundly but awoke easily  She is alert but completely disoriented to location and situation  Her family member at the bedside was explained the workup and recommendations  All questions answered to the best of my ability  Vitals: Blood Pressure: 140/77 (06/26/22 1450)  Pulse: 86 (06/26/22 1450)  Temperature: (!) 97 1 °F (36 2 °C) (06/26/22 1450)  Temp Source: Oral (06/24/22 2300)  Respirations: 16 (06/26/22 0655)  SpO2: 96 % (06/26/22 1450)  Exam:   Physical Exam  Vitals and nursing note reviewed  Constitutional:       General: She is not in acute distress  Appearance: She is not toxic-appearing  Cardiovascular:      Rate and Rhythm: Normal rate and regular rhythm  Pulmonary:      Effort: Pulmonary effort is normal  No respiratory distress  Abdominal:      Palpations: Abdomen is soft  Musculoskeletal:      Right lower leg: No edema  Left lower leg: No edema  Skin:     Comments: Chronic venous stasis bronze appearance to RLE, no warmth, no open wounds however eschar noted without surrounding erythema to suggest acute infection   Neurological:      Mental Status: She is alert  Discussion with Family: Updated  (niece) at bedside      Discharge instructions/Information to patient and family:   See after visit summary for information provided to patient and family  Provisions for Follow-Up Care:  See after visit summary for information related to follow-up care and any pertinent home health orders  Disposition:   Home with VNA Services (Reminder: Complete face to face encounter)    Planned Readmission:  None     Discharge Statement:  I spent > 35 minutes discharging the patient  This time was spent on the day of discharge  I had direct contact with the patient on the day of discharge  Greater than 50% of the total time was spent examining patient, answering all patient questions, arranging and discussing plan of care with patient as well as directly providing post-discharge instructions  Additional time then spent on discharge activities  Discharge Medications:  See after visit summary for reconciled discharge medications provided to patient and/or family        **Please Note: This note may have been constructed using a voice recognition system**

## 2022-06-27 NOTE — PLAN OF CARE
Problem: INFECTION - ADULT  Goal: Absence or prevention of progression during hospitalization  Description: INTERVENTIONS:  - Assess and monitor for signs and symptoms of infection  - Monitor lab/diagnostic results  - Monitor all insertion sites, i e  indwelling lines, tubes, and drains  - Monitor endotracheal if appropriate and nasal secretions for changes in amount and color  - [place] appropriate cooling/warming therapies per order  - Administer medications as ordered  - Instruct and encourage patient and family to use good hand hygiene technique  - Identify and instruct in appropriate isolation precautions for identified infection/condition  Outcome: Progressing  Goal: Absence of fever/infection during neutropenic period  Description: INTERVENTIONS:  - Monitor WBC    Outcome: Progressing    Problem: SAFETY ADULT  Goal: Patient will remain free of falls  Description: INTERVENTIONS:  - Educate patient/family on patient safety including physical limitations  - Instruct patient to call for assistance with activity   - Consult OT/PT to assist with strengthening/mobility   - Keep Call bell within reach  - Keep bed low and locked with side rails adjusted as appropriate  - Keep care items and personal belongings within reach  - Initiate and maintain comfort rounds  - Make Fall Risk Sign visible to staff  - Offer Toileting every 2 Hours, in advance of need  - Initiate/Maintain BED alarm  - Obtain necessary fall risk management equipment  - Apply yellow socks and bracelet for high fall risk patients  - Consider moving patient to room near nurses station  Outcome: Progressing     Problem: PAIN - ADULT  Goal: Verbalizes/displays adequate comfort level or baseline comfort level  Description: Interventions:  - Encourage patient to monitor pain and request assistance  - Assess pain using appropriate pain scale  - Administer analgesics based on type and severity of pain and evaluate response  - Implement non-pharmacological measures as appropriate and evaluate response  - Consider cultural and social influences on pain and pain management  - Notify physician/advanced practitioner if interventions unsuccessful or patient reports new pain  Outcome: Progressing     Problem: DISCHARGE PLANNING  Goal: Discharge to home or other facility with appropriate resources  Description: INTERVENTIONS:  - Identify barriers to discharge w/patient and caregiver  - Arrange for needed discharge resources and transportation as appropriate  - Identify discharge learning needs (meds, wound care, etc )  - Arrange for interpretive services to assist at discharge as needed  - Refer to Case Management Department for coordinating discharge planning if the patient needs post-hospital services based on physician/advanced practitioner order or complex needs related to functional status, cognitive ability, or social support system  Outcome: Progressing     Problem: Knowledge Deficit  Goal: Patient/family/caregiver demonstrates understanding of disease process, treatment plan, medications, and discharge instructions  Description: Complete learning assessment and assess knowledge base    Interventions:  - Provide teaching at level of understanding  - Provide teaching via preferred learning methods  Outcome: Progressing    Problem: Prexisting or High Potential for Compromised Skin Integrity  Goal: Skin integrity is maintained or improved  Description: INTERVENTIONS:  - Identify patients at risk for skin breakdown  - Assess and monitor skin integrity  - Assess and monitor nutrition and hydration status  - Monitor labs   - Assess for incontinence   - Turn and reposition patient  - Assist with mobility/ambulation  - Relieve pressure over bony prominences  - Avoid friction and shearing  - Provide appropriate hygiene as needed including keeping skin clean and dry  - Evaluate need for skin moisturizer/barrier cream  - Collaborate with interdisciplinary team   - Patient/family teaching  - Consider wound care consult   Outcome: Progressing

## 2022-06-27 NOTE — ASSESSMENT & PLAN NOTE
· Patient is below normal BMI  · There is no nutritionist in this hospital to determine the definitive and or presumptive diagnosis  · Will consider under weight in the setting of dementia and decreased appetite     · Nutritional supplements

## 2022-06-28 ENCOUNTER — HOME CARE VISIT (OUTPATIENT)
Dept: HOME HEALTH SERVICES | Facility: HOME HEALTHCARE | Age: 82
End: 2022-06-28

## 2022-06-28 NOTE — CASE COMMUNICATION
Pt denilson called back and address and number verified  Informed her that SN would be calling this kirill to schedule a time for visit for tomorrow

## 2022-06-29 ENCOUNTER — HOME CARE VISIT (OUTPATIENT)
Dept: HOME HEALTH SERVICES | Facility: HOME HEALTHCARE | Age: 82
End: 2022-06-29

## 2022-06-29 NOTE — CASE COMMUNICATION
Arrived to Pts home met by pts relatives who are caregivers  SN entered home with caregivers and began discussing case with caregivers  Upon talking with caregivers they report that there is currently no open wounds to pts right leg, pt is on no medcations besides 1 more day of prednisone, and for the most part pt does her own thing and is pretty independent despite the alzheimers disease  SN found no skilled need at this time  Family a greeable to contact PCP if open wounds develop  Family also reports no need for OT at this time

## 2022-07-05 PROBLEM — L25.9 CONTACT DERMATITIS: Status: RESOLVED | Noted: 2022-06-21 | Resolved: 2022-07-05

## 2022-07-06 ENCOUNTER — OFFICE VISIT (OUTPATIENT)
Dept: FAMILY MEDICINE CLINIC | Facility: CLINIC | Age: 82
End: 2022-07-06
Payer: MEDICARE

## 2022-07-06 ENCOUNTER — APPOINTMENT (OUTPATIENT)
Dept: LAB | Facility: CLINIC | Age: 82
End: 2022-07-06
Payer: MEDICARE

## 2022-07-06 VITALS
BODY MASS INDEX: 19.7 KG/M2 | HEART RATE: 78 BPM | HEIGHT: 69 IN | OXYGEN SATURATION: 95 % | DIASTOLIC BLOOD PRESSURE: 68 MMHG | SYSTOLIC BLOOD PRESSURE: 122 MMHG | WEIGHT: 133 LBS

## 2022-07-06 DIAGNOSIS — Z76.89 ENCOUNTER FOR SUPPORT AND COORDINATION OF TRANSITION OF CARE: ICD-10-CM

## 2022-07-06 DIAGNOSIS — I87.311 CHRONIC VENOUS HYPERTENSION (IDIOPATHIC) WITH ULCER OF RIGHT LOWER EXTREMITY (HCC): ICD-10-CM

## 2022-07-06 DIAGNOSIS — L20.89 OTHER ATOPIC DERMATITIS: ICD-10-CM

## 2022-07-06 DIAGNOSIS — L97.919 CHRONIC VENOUS HYPERTENSION (IDIOPATHIC) WITH ULCER OF RIGHT LOWER EXTREMITY (HCC): ICD-10-CM

## 2022-07-06 DIAGNOSIS — L03.115 CELLULITIS OF RIGHT LOWER EXTREMITY: Primary | ICD-10-CM

## 2022-07-06 PROBLEM — E44.1 MILD PROTEIN-CALORIE MALNUTRITION (HCC): Status: RESOLVED | Noted: 2021-07-28 | Resolved: 2022-07-06

## 2022-07-06 PROCEDURE — 99495 TRANSJ CARE MGMT MOD F2F 14D: CPT | Performed by: FAMILY MEDICINE

## 2022-07-06 PROCEDURE — 86003 ALLG SPEC IGE CRUDE XTRC EA: CPT

## 2022-07-06 PROCEDURE — 36415 COLL VENOUS BLD VENIPUNCTURE: CPT

## 2022-07-06 PROCEDURE — 82785 ASSAY OF IGE: CPT

## 2022-07-06 NOTE — PROGRESS NOTES
Assessment/Plan:        Problem List Items Addressed This Visit        Cardiovascular and Mediastinum    Chronic venous hypertension (idiopathic) with ulcer of right lower extremity ([address] )       Other    Cellulitis of right lower extremity - Primary      Other Visit Diagnoses     Other atopic dermatitis         Relevant Orders    Food Allergy Profile    [place] Allergy Panel, Adult    Encounter for support and coordination of transition of care              Continue supportive care for RLE -- regular gentle cleansing, moisturizing  Exam not entirely suggestive of fungal infection, though caretakers report some improvement in appearance with topical anti-fungal treatment, so reasonable to continue  Unclear etiology of intermittent rash on UE/trunk  Will check allergy panels as above to evaluate for obvious source that could be eliminated from diet  No improvement with oral or topical steroid  Subjective:     Patient ID: Priti Hendrickson is a 78 y o  female  HPI     Pt presents with her caretakers for hospital follow up s/p admission to [facility] from 6/21-6/27  Pt initially presented due to concern for RLE cellulitis  Pt was initated on IV antibiotics and evaluated by Wound Care  XR: No evidence of osteomyelitis  She was also on Prednisone (+ Benadryl, Pepcid) -- caused aggression, poor sleep    Today:   "I feel pretty good"   Has been elevating her leg   As soon as she stands up, her leg becomes more red -- the outer ridge of bumping/scaling is coming back -- they are wondering if it is fungal (they started using topical anti-fungal in April/May and they did note some improvement) -- wondering if they can try an oral anti-fungal, they don't want to see a Dermatologist   She also continues to have a rash intermittently on her upper extremities and torso -- they had previously been using topical steroid; unsure of cause     Review of Systems   Constitutional: Negative for appetite change and fever  Respiratory: Negative for shortness of breath  Cardiovascular: Negative for chest pain  Gastrointestinal: Negative for abdominal pain, constipation and diarrhea  Genitourinary: Negative for difficulty urinating  Skin: Positive for rash  Psychiatric/Behavioral: Negative for sleep disturbance  Objective:     Physical Exam  Vitals and nursing note reviewed  Constitutional:       General: She is not in acute distress  Appearance: Normal appearance  HENT:      Head: Normocephalic and atraumatic  Cardiovascular:      Rate and Rhythm: Normal rate and regular rhythm  Pulmonary:      Effort: Pulmonary effort is normal  No respiratory distress  Breath sounds: Normal breath sounds  Abdominal:      General: Bowel sounds are normal  There is no distension  Palpations: Abdomen is soft  Tenderness: There is no abdominal tenderness  Musculoskeletal:      Right lower leg: No edema  Left lower leg: No edema  Skin:     General: Skin is warm and dry  Comments: Scattered, raised, erythematous, warm, patches along B/L UE and torso with minimal overlying dry skin; varying in size (most confluent >10 cm in length); non-tender     RLE below knee: Erythematous (though improved per pt's caretakers) with dry crusting along edges; non-tender, not warm to touch  No obvious wound, though slight oozing at proximal edge and near lateral malleolus    Neurological:      General: No focal deficit present  Mental Status: She is alert  Psychiatric:         Mood and Affect: Mood normal            Vitals:    07/06/22 0950   BP: 122/68   Pulse: 78   SpO2: 95%   Weight: 60 3 kg (133 lb)   Height: 5' 9" (1 753 m)       Transitional Care Management Review:  Gilberto Bennett is a 78 y o  female here for TCM follow up       During the TCM phone call patient stated:    TCM Call (since 6/5/2022)     Date and time call was made  7/6/2022  9:53 AM    Hospital care reviewed  Records reviewed Patient was hospitialized at  [facility]    Date of Admission  06/21/22    Date of discharge  06/27/22    Diagnosis  Cellulitis of right lower extremity    Disposition  Home    Were the patients medications reviewed and updated  No    Current Symptoms  None      TCM Call (since 6/5/2022)     Post hospital issues  None    Should patient be enrolled in anticoag monitoring? No    Scheduled for follow up?   Yes    Did you obtain your prescribed medications  Yes    Do you need help managing your prescriptions or medications  No    Is transportation to your appointment needed  No    I have advised the patient to call PCP with any new or worsening symptoms  JEAN-CLAUDE Gonzales, DO

## 2022-07-07 LAB
A ALTERNATA IGE QN: <0.1 KUA/I
A FUMIGATUS IGE QN: <0.1 KUA/I
ALMOND IGE QN: <0.1 KUA/I
BERMUDA GRASS IGE QN: <0.1 KUA/I
BOXELDER IGE QN: <0.1 KUA/I
C HERBARUM IGE QN: <0.1 KUA/I
CASHEW NUT IGE QN: <0.1 KUA/I
CAT DANDER IGE QN: <0.1 KUA/I
CMN PIGWEED IGE QN: <0.1 KUA/I
CODFISH IGE QN: <0.1 KUA/I
COMMON RAGWEED IGE QN: <0.1 KUA/I
COTTONWOOD IGE QN: <0.1 KUA/I
D FARINAE IGE QN: <0.1 KUA/I
D PTERONYSS IGE QN: <0.1 KUA/I
DOG DANDER IGE QN: <0.1 KUA/I
EGG WHITE IGE QN: <0.1 KUA/I
GLUTEN IGE QN: <0.1 KUA/I
HAZELNUT IGE QN: <0.1 KUA/L
LONDON PLANE IGE QN: <0.1 KUA/I
MILK IGE QN: <0.1 KUA/I
MOUSE URINE PROT IGE QN: <0.1 KUA/I
MT JUNIPER IGE QN: <0.1 KUA/I
MUGWORT IGE QN: <0.1 KUA/I
P NOTATUM IGE QN: <0.1 KUA/I
PEANUT IGE QN: <0.1 KUA/I
ROACH IGE QN: <0.1 KUA/I
SALMON IGE QN: <0.1 KUA/I
SCALLOP IGE QN: <0.1 KUA/L
SESAME SEED IGE QN: <0.1 KUA/I
SHEEP SORREL IGE QN: <0.1 KUA/I
SHRIMP IGE QN: <0.1 KUA/L
SILVER BIRCH IGE QN: <0.1 KUA/I
SOYBEAN IGE QN: <0.1 KUA/I
TIMOTHY IGE QN: <0.1 KUA/I
TOTAL IGE SMQN RAST: 8.37 KU/L (ref 0–113)
TOTAL IGE SMQN RAST: 8.46 KU/L (ref 0–113)
TUNA IGE QN: <0.1 KUA/I
WALNUT IGE QN: <0.1 KUA/I
WALNUT IGE QN: <0.1 KUA/I
WHEAT IGE QN: <0.1 KUA/I
WHITE ASH IGE QN: <0.1 KUA/I
WHITE ELM IGE QN: <0.1 KUA/I
WHITE MULBERRY IGE QN: <0.1 KUA/I
WHITE OAK IGE QN: <0.1 KUA/I

## 2022-07-28 ENCOUNTER — OFFICE VISIT (OUTPATIENT)
Dept: FAMILY MEDICINE CLINIC | Facility: CLINIC | Age: 82
End: 2022-07-28
Payer: MEDICARE

## 2022-07-28 VITALS
HEART RATE: 82 BPM | BODY MASS INDEX: 19.55 KG/M2 | SYSTOLIC BLOOD PRESSURE: 118 MMHG | DIASTOLIC BLOOD PRESSURE: 70 MMHG | HEIGHT: 69 IN | OXYGEN SATURATION: 98 % | TEMPERATURE: 96.9 F | WEIGHT: 132 LBS

## 2022-07-28 DIAGNOSIS — Z13.31 SCREENING FOR DEPRESSION: ICD-10-CM

## 2022-07-28 DIAGNOSIS — Z00.00 MEDICARE ANNUAL WELLNESS VISIT, SUBSEQUENT: Primary | ICD-10-CM

## 2022-07-28 PROCEDURE — G0439 PPPS, SUBSEQ VISIT: HCPCS | Performed by: FAMILY MEDICINE

## 2022-07-28 NOTE — PROGRESS NOTES
Assessment and Plan:     Problem List Items Addressed This Visit    None     Visit Diagnoses     Medicare annual wellness visit, subsequent    -  Primary    Screening for depression               Preventive health issues were discussed with patient, and age appropriate screening tests were ordered as noted in patient's After Visit Summary  Personalized health advice and appropriate referrals for health education or preventive services given if needed, as noted in patient's After Visit Summary  History of Present Illness:     Patient presents with her friend for a Medicare Wellness Visit    HPI   Patient Care Team:  Anju Person, DO as PCP - General (Family Medicine)     Review of Systems:     Review of Systems   Constitutional: Negative for unexpected weight change  HENT: Negative for congestion, ear pain, rhinorrhea and sore throat  Eyes: Negative for visual disturbance  Respiratory: Negative for cough and shortness of breath  Cardiovascular: Negative for chest pain, palpitations and leg swelling  Gastrointestinal: Negative for abdominal pain, constipation and diarrhea  Endocrine: Negative for polyuria  Genitourinary: Negative for dysuria  Skin: Positive for rash (chronic, still using hydrocortisone)  Neurological: Negative for dizziness and headaches  Psychiatric/Behavioral: Negative for sleep disturbance          Problem List:     Patient Active Problem List   Diagnosis    Chronic venous hypertension (idiopathic) with ulcer of right lower extremity ([address] )    Patient is Confucianist    Cellulitis of right lower extremity    Dementia ([address] )    Body mass index (BMI) of 19 or less in adult      Past Medical and Surgical History:     Past Medical History:   Diagnosis Date    Dementia ([address] )     Memory loss 2018    Mild protein-calorie malnutrition ([address] )  7/28/2021     Past Surgical History:   Procedure Laterality Date    ANKLE FRACTURE SURGERY  1996      Family History: Family History   Problem Relation Age of Onset   Fer Sellers Dementia Sister         Suri's sister is     Cancer Father       Social History:     Social History     Socioeconomic History    Marital status:      Spouse name: None    Number of children: None    Years of education: None    Highest education level: None   Occupational History    None   Tobacco Use    Smoking status: Never Smoker    Smokeless tobacco: Never Used   Vaping Use    Vaping Use: Never used   Substance and Sexual Activity    Alcohol use: Not Currently    Drug use: No    Sexual activity: Not Currently     Partners: Male     Birth control/protection: None   Other Topics Concern    None   Social History Narrative    Lives alone      Social Determinants of Health     Financial Resource Strain: Not on file   Food Insecurity: No Food Insecurity    Worried About Running Out of Food in the Last Year: Never true    Nathaniel of Food in the Last Year: Never true   Transportation Needs: No Transportation Needs    Lack of Transportation (Medical): No    Lack of Transportation (Non-Medical): No   Physical Activity: Not on file   Stress: Not on file   Social Connections: Not on file   Intimate Partner Violence: Not on file   Housing Stability: Low Risk     Unable to Pay for Housing in the Last Year: No    Number of Places Lived in the Last Year: 1    Unstable Housing in the Last Year: No      Medications and Allergies:     No current outpatient medications on file  No current facility-administered medications for this visit  Allergies   Allergen Reactions    Prednisone Other (See Comments)     Insomnia, Delirium/Anger -- was given oral steroid for rash while hospitalized, required 1:1 and restraints       Immunizations:     Immunization History   Administered Date(s) Administered    COVID-19 MODERNA VACC 0 5 ML IM 2021, 10/19/2021      Health Maintenance:      There are no preventive care reminders to display for this patient  Topic Date Due    Pneumococcal Vaccine: 65+ Years (1 - PCV) Never done    COVID-19 Vaccine (3 - Booster for Moderna series) 03/19/2022    Influenza Vaccine (1) 09/01/2022      Medicare Screening Tests and Risk Assessments:     Randall Lozano is here for her Subsequent Wellness visit  Health Risk Assessment:   Patient rates overall health as excellent  Patient feels that their physical health rating is slightly worse  Patient is very satisfied with their life  Eyesight was rated as same  Hearing was rated as same  Patient feels that their emotional and mental health rating is same  Patients states they are never, rarely angry  Patient states they are never, rarely unusually tired/fatigued  Pain experienced in the last 7 days has been none  Patient states that she has experienced no weight loss or gain in last 6 months  Depression Screening:   PHQ-2 Score: 0      Fall Risk Screening: In the past year, patient has experienced: no history of falling in past year      Urinary Incontinence Screening:   Patient has not leaked urine accidently in the last six months  Home Safety:  Patient does not have trouble with stairs inside or outside of their home  Patient has working smoke alarms and has working carbon monoxide detector  Home safety hazards include: none  Nutrition:   Current diet is Regular  Medications:   Patient is not currently taking any over-the-counter supplements  Patient is not able to manage medications  Activities of Daily Living (ADLs)/Instrumental Activities of Daily Living (IADLs):   Walk and transfer into and out of bed and chair?: Yes  Dress and groom yourself?: No    Bathe or shower yourself?: No    Feed yourself?  Yes  Do your laundry/housekeeping?: Yes  Manage your money, pay your bills and track your expenses?: No  Make your own meals?: No    Do your own shopping?: No    Durable Medical Equipment Suppliers  N/A    Previous Hospitalizations:   Any hospitalizations or ED visits within the last 12 months?: Yes    How many hospitalizations have you had in the last year?: 1-2    Advance Care Planning:   Living will: Yes    Advanced directive: Yes      Cognitive Screening:   Provider or family/friend/caregiver concerned regarding cognition?: No    PREVENTIVE SCREENINGS      Cardiovascular Screening:    General: Screening Current    Due for: Lipid Panel      Diabetes Screening:     General: Screening Current    Due for: Blood Glucose      Colorectal Cancer Screening:     General: Screening Not Indicated      Breast Cancer Screening:     General: Screening Not Indicated      Cervical Cancer Screening:    General: Screening Not Indicated      Osteoporosis Screening:    General: Patient Declines    Due for: DXA Appendicular      Abdominal Aortic Aneurysm (AAA) Screening:        General: Screening Not Indicated      Lung Cancer Screening:     General: Screening Not Indicated      Hepatitis C Screening:    General: Screening Not Indicated    Screening, Brief Intervention, and Referral to Treatment (SBIRT)    Screening  Typical number of drinks in a day: 0  Typical number of drinks in a week: 0  Interpretation: Low risk drinking behavior  Single Item Drug Screening:  How often have you used an illegal drug (including marijuana) or a prescription medication for non-medical reasons in the past year? never    Single Item Drug Screen Score: 0  Interpretation: Negative screen for possible drug use disorder    No exam data present     Physical Exam:     /70 (BP Location: Left arm, Patient Position: Sitting, Cuff Size: Large)   Pulse 82   Temp (!) 96 9 °F (36 1 °C)   Ht 5' 9" (1 753 m)   Wt 59 9 kg (132 lb)   SpO2 98%   BMI 19 49 kg/m²     Physical Exam  Vitals and nursing note reviewed  Constitutional:       General: She is not in acute distress  Appearance: She is well-developed  HENT:      Head: Normocephalic and atraumatic        Right Ear: Tympanic membrane, ear canal and external ear normal       Left Ear: Tympanic membrane, ear canal and external ear normal       Nose: Nose normal       Mouth/Throat:      Mouth: Mucous membranes are moist    Eyes:      Conjunctiva/sclera: Conjunctivae normal    Neck:      Thyroid: No thyromegaly  Cardiovascular:      Rate and Rhythm: Normal rate and regular rhythm  Pulmonary:      Effort: Pulmonary effort is normal  No respiratory distress  Breath sounds: Normal breath sounds  Abdominal:      General: Bowel sounds are normal  There is no distension  Palpations: Abdomen is soft  Tenderness: There is no abdominal tenderness  Musculoskeletal:         General: Normal range of motion  Lymphadenopathy:      Cervical: No cervical adenopathy  Skin:     General: Skin is warm and dry  Findings: Rash (see photos) present  Neurological:      General: No focal deficit present  Mental Status: She is alert     Psychiatric:         Mood and Affect: Mood normal                           Aline Vincent DO

## 2022-07-28 NOTE — PATIENT INSTRUCTIONS
Medicare Preventive Visit Patient Instructions  Thank you for completing your Welcome to Medicare Visit or Medicare Annual Wellness Visit today  Your next wellness visit will be due in one year (7/29/2023)  The screening/preventive services that you may require over the next 5-10 years are detailed below  Some tests may not apply to you based off risk factors and/or age  Screening tests ordered at today's visit but not completed yet may show as past due  Also, please note that scanned in results may not display below  Preventive Screenings:  Service Recommendations Previous Testing/Comments   Colorectal Cancer Screening  * Colonoscopy    * Fecal Occult Blood Test (FOBT)/Fecal Immunochemical Test (FIT)  * Fecal DNA/Cologuard Test  * Flexible Sigmoidoscopy Age: 54-65 years old   Colonoscopy: every 10 years (may be performed more frequently if at higher risk)  OR  FOBT/FIT: every 1 year  OR  Cologuard: every 3 years  OR  Sigmoidoscopy: every 5 years  Screening may be recommended earlier than age 48 if at higher risk for colorectal cancer  Also, an individualized decision between you and your healthcare provider will decide whether screening between the ages of 74-80 would be appropriate  Colonoscopy: Not on file  FOBT/FIT: Not on file  Cologuard: Not on file  Sigmoidoscopy: Not on file          Breast Cancer Screening Age: 36 years old  Frequency: every 1-2 years  Not required if history of left and right mastectomy Mammogram: Not on file        Cervical Cancer Screening Between the ages of 21-29, pap smear recommended once every 3 years  Between the ages of 33-67, can perform pap smear with HPV co-testing every 5 years     Recommendations may differ for women with a history of total hysterectomy, cervical cancer, or abnormal pap smears in past  Pap Smear: Not on file    Screening Not Indicated   Hepatitis C Screening Once for adults born between [facility]  More frequently in patients at high risk for Hepatitis C Hep C Antibody: Not on file        Diabetes Screening 1-2 times per year if you're at risk for diabetes or have pre-diabetes Fasting glucose: 82 mg/dL   A1C: No results in last 5 years    Screening Current   Cholesterol Screening Once every 5 years if you don't have a lipid disorder  May order more often based on risk factors  Lipid panel: 07/29/2021    Screening Current     Other Preventive Screenings Covered by Medicare:  1  Abdominal Aortic Aneurysm (AAA) Screening: covered once if your at risk  You're considered to be at risk if you have a family history of AAA  2  Lung Cancer Screening: covers low dose CT scan once per year if you meet all of the following conditions: (1) Age 50-69; (2) No signs or symptoms of lung cancer; (3) Current smoker or have quit smoking within the last 15 years; (4) You have a tobacco smoking history of at least 30 pack years (packs per day multiplied by number of years you smoked); (5) You get a written order from a healthcare provider  3  Glaucoma Screening: covered annually if you're considered high risk: (1) You have diabetes OR (2) Family history of glaucoma OR (3)  aged 48 and older OR (3)  American aged 72 and older  3  Osteoporosis Screening: covered every 2 years if you meet one of the following conditions: (1) You're estrogen deficient and at risk for osteoporosis based off medical history and other findings; (2) Have a vertebral abnormality; (3) On glucocorticoid therapy for more than 3 months; (4) Have primary hyperparathyroidism; (5) On osteoporosis medications and need to assess response to drug therapy  · Last bone density test (DXA Scan): Not on file  5  HIV Screening: covered annually if you're between the age of 12-76  Also covered annually if you are younger than 13 and older than 72 with risk factors for HIV infection  For pregnant patients, it is covered up to 3 times per pregnancy      Immunizations:  Immunization Recommendations   Influenza Vaccine Annual influenza vaccination during flu season is recommended for all persons aged >= 6 months who do not have contraindications   Pneumococcal Vaccine (Prevnar and Pneumovax)  * Prevnar = PCV13  * Pneumovax = PPSV23   Adults 25-60 years old: 1-3 doses may be recommended based on certain risk factors  Adults 72 years old: Prevnar (PCV13) vaccine recommended followed by Pneumovax (PPSV23) vaccine  If already received PPSV23 since turning 65, then PCV13 recommended at least one year after PPSV23 dose  Hepatitis B Vaccine 3 dose series if at intermediate or high risk (ex: diabetes, end stage renal disease, liver disease)   Tetanus (Td) Vaccine - COST NOT COVERED BY MEDICARE PART B Following completion of primary series, a booster dose should be given every 10 years to maintain immunity against tetanus  Td may also be given as tetanus wound prophylaxis  Tdap Vaccine - COST NOT COVERED BY MEDICARE PART B Recommended at least once for all adults  For pregnant patients, recommended with each pregnancy  Shingles Vaccine (Shingrix) - COST NOT COVERED BY MEDICARE PART B  2 shot series recommended in those aged 48 and above     Health Maintenance Due:  There are no preventive care reminders to display for this patient  Immunizations Due:      Topic Date Due    Pneumococcal Vaccine: 65+ Years (1 - PCV) Never done    COVID-19 Vaccine (3 - Booster for Moderna series) 03/19/2022    Influenza Vaccine (1) 09/01/2022     Advance Directives   What are advance directives? Advance directives are legal documents that state your wishes and plans for medical care  These plans are made ahead of time in case you lose your ability to make decisions for yourself  Advance directives can apply to any medical decision, such as the treatments you want, and if you want to donate organs  What are the types of advance directives?   There are many types of advance directives, and each state has rules about how to use them  You may choose a combination of any of the following:  · Living will: This is a written record of the treatment you want  You can also choose which treatments you do not want, which to limit, and which to stop at a certain time  This includes surgery, medicine, IV fluid, and tube feedings  · Durable power of  for healthcare [facility]): This is a written record that states who you want to make healthcare choices for you when you are unable to make them for yourself  This person, called a proxy, is usually a family member or a friend  You may choose more than 1 proxy  · Do not resuscitate (DNR) order:  A DNR order is used in case your heart stops beating or you stop breathing  It is a request not to have certain forms of treatment, such as CPR  A DNR order may be included in other types of advance directives  · Medical directive: This covers the care that you want if you are in a coma, near death, or unable to make decisions for yourself  You can list the treatments you want for each condition  Treatment may include pain medicine, surgery, blood transfusions, dialysis, IV or tube feedings, and a ventilator (breathing machine)  · Values history: This document has questions about your views, beliefs, and how you feel and think about life  This information can help others choose the care that you would choose  Why are advance directives important? An advance directive helps you control your care  Although spoken wishes may be used, it is better to have your wishes written down  Spoken wishes can be misunderstood, or not followed  Treatments may be given even if you do not want them  An advance directive may make it easier for your family to make difficult choices about your care  © Copyright [employer] 2018 Information is for End User's use only and may not be sold, redistributed or otherwise used for commercial purposes   All illustrations and images included in [facility] are the copyrighted property of [employer]  or [address] [place], [place] [address]

## 2023-06-15 ENCOUNTER — TELEPHONE (OUTPATIENT)
Dept: FAMILY MEDICINE CLINIC | Facility: CLINIC | Age: 83
End: 2023-06-15

## 2023-06-15 DIAGNOSIS — F03.90 DEMENTIA WITHOUT BEHAVIORAL DISTURBANCE, PSYCHOTIC DISTURBANCE, MOOD DISTURBANCE, OR ANXIETY, UNSPECIFIED DEMENTIA SEVERITY, UNSPECIFIED DEMENTIA TYPE (HCC): Primary | ICD-10-CM

## 2023-06-15 DIAGNOSIS — Z13.220 SCREENING, LIPID: ICD-10-CM

## 2023-06-15 DIAGNOSIS — Z13.1 SCREENING FOR DIABETES MELLITUS: ICD-10-CM

## 2023-06-15 PROBLEM — L03.115 CELLULITIS OF RIGHT LOWER EXTREMITY: Status: RESOLVED | Noted: 2022-06-21 | Resolved: 2023-06-15

## 2023-06-15 NOTE — TELEPHONE ENCOUNTER
Spoke with the pt's niece, she just scheduled the pt's Medicare Annual Wellness appt for 8/8  The pt is wondering if there is any blood work that she needs to completed before this appointment  Niece would like to be notified at [phone] when/if orders are put in

## 2023-07-31 ENCOUNTER — RA CDI HCC (OUTPATIENT)
Dept: OTHER | Facility: HOSPITAL | Age: 83
End: 2023-07-31

## 2023-08-02 ENCOUNTER — APPOINTMENT (OUTPATIENT)
Dept: LAB | Facility: CLINIC | Age: 83
End: 2023-08-02
Payer: MEDICARE

## 2023-08-02 DIAGNOSIS — F03.90 DEMENTIA WITHOUT BEHAVIORAL DISTURBANCE, PSYCHOTIC DISTURBANCE, MOOD DISTURBANCE, OR ANXIETY, UNSPECIFIED DEMENTIA SEVERITY, UNSPECIFIED DEMENTIA TYPE (HCC): ICD-10-CM

## 2023-08-02 DIAGNOSIS — Z13.220 SCREENING, LIPID: ICD-10-CM

## 2023-08-02 DIAGNOSIS — Z13.1 SCREENING FOR DIABETES MELLITUS: ICD-10-CM

## 2023-08-02 LAB
ALBUMIN SERPL BCP-MCNC: 3.6 G/DL (ref 3.5–5)
ALP SERPL-CCNC: 84 U/L (ref 46–116)
ALT SERPL W P-5'-P-CCNC: 26 U/L (ref 12–78)
ANION GAP SERPL CALCULATED.3IONS-SCNC: 3 MMOL/L
AST SERPL W P-5'-P-CCNC: 25 U/L (ref 5–45)
BASOPHILS # BLD AUTO: 0.06 THOUSANDS/ÂΜL (ref 0–0.1)
BASOPHILS NFR BLD AUTO: 1 % (ref 0–1)
BILIRUB SERPL-MCNC: 1.1 MG/DL (ref 0.2–1)
BUN SERPL-MCNC: 22 MG/DL (ref 5–25)
CALCIUM SERPL-MCNC: 9.3 MG/DL (ref 8.3–10.1)
CHLORIDE SERPL-SCNC: 112 MMOL/L (ref 96–108)
CHOLEST SERPL-MCNC: 197 MG/DL
CO2 SERPL-SCNC: 26 MMOL/L (ref 21–32)
CREAT SERPL-MCNC: 0.78 MG/DL (ref 0.6–1.3)
EOSINOPHIL # BLD AUTO: 0.1 THOUSAND/ÂΜL (ref 0–0.61)
EOSINOPHIL NFR BLD AUTO: 2 % (ref 0–6)
ERYTHROCYTE [DISTWIDTH] IN BLOOD BY AUTOMATED COUNT: 15 % (ref 11.6–15.1)
GFR SERPL CREATININE-BSD FRML MDRD: 69 ML/MIN/1.73SQ M
GLUCOSE P FAST SERPL-MCNC: 92 MG/DL (ref 65–99)
HCT VFR BLD AUTO: 38.4 % (ref 34.8–46.1)
HDLC SERPL-MCNC: 101 MG/DL
HGB BLD-MCNC: 12.7 G/DL (ref 11.5–15.4)
IMM GRANULOCYTES # BLD AUTO: 0.01 THOUSAND/UL (ref 0–0.2)
IMM GRANULOCYTES NFR BLD AUTO: 0 % (ref 0–2)
LDLC SERPL CALC-MCNC: 88 MG/DL (ref 0–100)
LYMPHOCYTES # BLD AUTO: 1.06 THOUSANDS/ÂΜL (ref 0.6–4.47)
LYMPHOCYTES NFR BLD AUTO: 26 % (ref 14–44)
MCH RBC QN AUTO: 34.5 PG (ref 26.8–34.3)
MCHC RBC AUTO-ENTMCNC: 33.1 G/DL (ref 31.4–37.4)
MCV RBC AUTO: 104 FL (ref 82–98)
MONOCYTES # BLD AUTO: 0.43 THOUSAND/ÂΜL (ref 0.17–1.22)
MONOCYTES NFR BLD AUTO: 10 % (ref 4–12)
NEUTROPHILS # BLD AUTO: 2.49 THOUSANDS/ÂΜL (ref 1.85–7.62)
NEUTS SEG NFR BLD AUTO: 61 % (ref 43–75)
NONHDLC SERPL-MCNC: 96 MG/DL
NRBC BLD AUTO-RTO: 0 /100 WBCS
PLATELET # BLD AUTO: 136 THOUSANDS/UL (ref 149–390)
PMV BLD AUTO: 11.6 FL (ref 8.9–12.7)
POTASSIUM SERPL-SCNC: 4.2 MMOL/L (ref 3.5–5.3)
PROT SERPL-MCNC: 6.6 G/DL (ref 6.4–8.4)
RBC # BLD AUTO: 3.68 MILLION/UL (ref 3.81–5.12)
SODIUM SERPL-SCNC: 141 MMOL/L (ref 135–147)
TRIGL SERPL-MCNC: 38 MG/DL
TSH SERPL DL<=0.05 MIU/L-ACNC: 4.31 UIU/ML (ref 0.45–4.5)
VIT B12 SERPL-MCNC: 899 PG/ML (ref 180–914)
WBC # BLD AUTO: 4.15 THOUSAND/UL (ref 4.31–10.16)

## 2023-08-02 PROCEDURE — 82607 VITAMIN B-12: CPT

## 2023-08-02 PROCEDURE — 36415 COLL VENOUS BLD VENIPUNCTURE: CPT

## 2023-08-02 PROCEDURE — 80053 COMPREHEN METABOLIC PANEL: CPT

## 2023-08-02 PROCEDURE — 85025 COMPLETE CBC W/AUTO DIFF WBC: CPT

## 2023-08-02 PROCEDURE — 80061 LIPID PANEL: CPT

## 2023-08-02 PROCEDURE — 84443 ASSAY THYROID STIM HORMONE: CPT

## 2023-08-08 ENCOUNTER — OFFICE VISIT (OUTPATIENT)
Dept: FAMILY MEDICINE CLINIC | Facility: CLINIC | Age: 83
End: 2023-08-08
Payer: MEDICARE

## 2023-08-08 VITALS
HEIGHT: 69 IN | TEMPERATURE: 97.2 F | SYSTOLIC BLOOD PRESSURE: 119 MMHG | HEART RATE: 71 BPM | BODY MASS INDEX: 19.26 KG/M2 | DIASTOLIC BLOOD PRESSURE: 76 MMHG | WEIGHT: 130 LBS | OXYGEN SATURATION: 97 %

## 2023-08-08 DIAGNOSIS — Z00.00 MEDICARE ANNUAL WELLNESS VISIT, SUBSEQUENT: Primary | ICD-10-CM

## 2023-08-08 DIAGNOSIS — F03.90 DEMENTIA WITHOUT BEHAVIORAL DISTURBANCE, PSYCHOTIC DISTURBANCE, MOOD DISTURBANCE, OR ANXIETY, UNSPECIFIED DEMENTIA SEVERITY, UNSPECIFIED DEMENTIA TYPE (HCC): ICD-10-CM

## 2023-08-08 DIAGNOSIS — R79.89 ABNORMAL CBC: ICD-10-CM

## 2023-08-08 DIAGNOSIS — I87.311 CHRONIC VENOUS HYPERTENSION (IDIOPATHIC) WITH ULCER OF RIGHT LOWER EXTREMITY (HCC): ICD-10-CM

## 2023-08-08 DIAGNOSIS — L97.919 CHRONIC VENOUS HYPERTENSION (IDIOPATHIC) WITH ULCER OF RIGHT LOWER EXTREMITY (HCC): ICD-10-CM

## 2023-08-08 PROCEDURE — G0438 PPPS, INITIAL VISIT: HCPCS | Performed by: FAMILY MEDICINE

## 2023-08-08 NOTE — PATIENT INSTRUCTIONS
Medicare Preventive Visit Patient Instructions  Thank you for completing your Welcome to Medicare Visit or Medicare Annual Wellness Visit today. Your next wellness visit will be due in one year (8/8/2024). The screening/preventive services that you may require over the next 5-10 years are detailed below. Some tests may not apply to you based off risk factors and/or age. Screening tests ordered at today's visit but not completed yet may show as past due. Also, please note that scanned in results may not display below. Preventive Screenings:  Service Recommendations Previous Testing/Comments   Colorectal Cancer Screening  * Colonoscopy    * Fecal Occult Blood Test (FOBT)/Fecal Immunochemical Test (FIT)  * Fecal DNA/Cologuard Test  * Flexible Sigmoidoscopy Age: 43-73 years old   Colonoscopy: every 10 years (may be performed more frequently if at higher risk)  OR  FOBT/FIT: every 1 year  OR  Cologuard: every 3 years  OR  Sigmoidoscopy: every 5 years  Screening may be recommended earlier than age 39 if at higher risk for colorectal cancer. Also, an individualized decision between you and your healthcare provider will decide whether screening between the ages of 77-80 would be appropriate. Colonoscopy: Not on file  FOBT/FIT: Not on file  Cologuard: Not on file  Sigmoidoscopy: Not on file    Screening Not Indicated     Breast Cancer Screening Age: 36 years old  Frequency: every 1-2 years  Not required if history of left and right mastectomy Mammogram: Not on file        Cervical Cancer Screening Between the ages of 21-29, pap smear recommended once every 3 years. Between the ages of 32-69, can perform pap smear with HPV co-testing every 5 years.    Recommendations may differ for women with a history of total hysterectomy, cervical cancer, or abnormal pap smears in past. Pap Smear: Not on file    Screening Not Indicated   Hepatitis C Screening Once for adults born between 1945 and 1965  More frequently in patients at high risk for Hepatitis C Hep C Antibody: Not on file        Diabetes Screening 1-2 times per year if you're at risk for diabetes or have pre-diabetes Fasting glucose: 92 mg/dL (8/2/2023)  A1C: No results in last 5 years (No results in last 5 years)  Screening Current   Cholesterol Screening Once every 5 years if you don't have a lipid disorder. May order more often based on risk factors. Lipid panel: 08/02/2023    Screening Current     Other Preventive Screenings Covered by Medicare:  1. Abdominal Aortic Aneurysm (AAA) Screening: covered once if your at risk. You're considered to be at risk if you have a family history of AAA. 2. Lung Cancer Screening: covers low dose CT scan once per year if you meet all of the following conditions: (1) Age 48-67; (2) No signs or symptoms of lung cancer; (3) Current smoker or have quit smoking within the last 15 years; (4) You have a tobacco smoking history of at least 20 pack years (packs per day multiplied by number of years you smoked); (5) You get a written order from a healthcare provider. 3. Glaucoma Screening: covered annually if you're considered high risk: (1) You have diabetes OR (2) Family history of glaucoma OR (3)  aged 48 and older OR (3)  American aged 72 and older  3. Osteoporosis Screening: covered every 2 years if you meet one of the following conditions: (1) You're estrogen deficient and at risk for osteoporosis based off medical history and other findings; (2) Have a vertebral abnormality; (3) On glucocorticoid therapy for more than 3 months; (4) Have primary hyperparathyroidism; (5) On osteoporosis medications and need to assess response to drug therapy. · Last bone density test (DXA Scan): Not on file. 5. HIV Screening: covered annually if you're between the age of 14-79. Also covered annually if you are younger than 13 and older than 72 with risk factors for HIV infection.  For pregnant patients, it is covered up to 3 times per pregnancy. Immunizations:  Immunization Recommendations   Influenza Vaccine Annual influenza vaccination during flu season is recommended for all persons aged >= 6 months who do not have contraindications   Pneumococcal Vaccine   * Pneumococcal conjugate vaccine = PCV13 (Prevnar 13), PCV15 (Vaxneuvance), PCV20 (Prevnar 20)  * Pneumococcal polysaccharide vaccine = PPSV23 (Pneumovax) Adults 20-63 years old: 1-3 doses may be recommended based on certain risk factors  Adults 72 years old: 1-2 doses may be recommended based off what pneumonia vaccine you previously received   Hepatitis B Vaccine 3 dose series if at intermediate or high risk (ex: diabetes, end stage renal disease, liver disease)   Tetanus (Td) Vaccine - COST NOT COVERED BY MEDICARE PART B Following completion of primary series, a booster dose should be given every 10 years to maintain immunity against tetanus. Td may also be given as tetanus wound prophylaxis. Tdap Vaccine - COST NOT COVERED BY MEDICARE PART B Recommended at least once for all adults. For pregnant patients, recommended with each pregnancy. Shingles Vaccine (Shingrix) - COST NOT COVERED BY MEDICARE PART B  2 shot series recommended in those aged 48 and above     Health Maintenance Due:  There are no preventive care reminders to display for this patient. Immunizations Due:      Topic Date Due   • Pneumococcal Vaccine: 65+ Years (1 - PCV) Never done   • COVID-19 Vaccine (3 - Moderna series) 12/14/2021   • Influenza Vaccine (1) 09/01/2023     Advance Directives   What are advance directives? Advance directives are legal documents that state your wishes and plans for medical care. These plans are made ahead of time in case you lose your ability to make decisions for yourself. Advance directives can apply to any medical decision, such as the treatments you want, and if you want to donate organs. What are the types of advance directives?   There are many types of advance directives, and each state has rules about how to use them. You may choose a combination of any of the following:  · Living will: This is a written record of the treatment you want. You can also choose which treatments you do not want, which to limit, and which to stop at a certain time. This includes surgery, medicine, IV fluid, and tube feedings. · Durable power of  for healthcare [facility]): This is a written record that states who you want to make healthcare choices for you when you are unable to make them for yourself. This person, called a proxy, is usually a family member or a friend. You may choose more than 1 proxy. · Do not resuscitate (DNR) order:  A DNR order is used in case your heart stops beating or you stop breathing. It is a request not to have certain forms of treatment, such as CPR. A DNR order may be included in other types of advance directives. · Medical directive: This covers the care that you want if you are in a coma, near death, or unable to make decisions for yourself. You can list the treatments you want for each condition. Treatment may include pain medicine, surgery, blood transfusions, dialysis, IV or tube feedings, and a ventilator (breathing machine). · Values history: This document has questions about your views, beliefs, and how you feel and think about life. This information can help others choose the care that you would choose. Why are advance directives important? An advance directive helps you control your care. Although spoken wishes may be used, it is better to have your wishes written down. Spoken wishes can be misunderstood, or not followed. Treatments may be given even if you do not want them. An advance directive may make it easier for your family to make difficult choices about your care. Urinary Incontinence   Urinary incontinence (UI)  is when you lose control of your bladder. UI develops because your bladder cannot store or empty urine properly.  The 3 most common types of UI are stress incontinence, urge incontinence, or both. Medicines:   · May be given to help strengthen your bladder control. Report any side effects of medication to your healthcare provider. Do pelvic muscle exercises often:  Your pelvic muscles help you stop urinating. Squeeze these muscles tight for 5 seconds, then relax for 5 seconds. Gradually work up to squeezing for 10 seconds. Do 3 sets of 15 repetitions a day, or as directed. This will help strengthen your pelvic muscles and improve bladder control. Train your bladder:  Go to the bathroom at set times, such as every 2 hours, even if you do not feel the urge to go. You can also try to hold your urine when you feel the urge to go. For example, hold your urine for 5 minutes when you feel the urge to go. As that becomes easier, hold your urine for 10 minutes. Self-care:   · Keep a UI record. Write down how often you leak urine and how much you leak. Make a note of what you were doing when you leaked urine. · Drink liquids as directed. You may need to limit the amount of liquid you drink to help control your urine leakage. Do not drink any liquid right before you go to bed. Limit or do not have drinks that contain caffeine or alcohol. · Prevent constipation. Eat a variety of high-fiber foods. Good examples are high-fiber cereals, beans, vegetables, and whole-grain breads. Walking is the best way to trigger your intestines to have a bowel movement. · Exercise regularly and maintain a healthy weight. Weight loss and exercise will decrease pressure on your bladder and help you control your leakage. · Use a catheter as directed  to help empty your bladder. A catheter is a tiny, plastic tube that is put into your bladder to drain your urine. · Go to behavior therapy as directed. Behavior therapy may be used to help you learn to control your urge to urinate.        © Copyright [employer] 2018 Information is for End User's use only and may not be sold, redistributed or otherwise used for commercial purposes.  All illustrations and images included in CareNotes® are the copyrighted property of [employer]., Inc[employer] or [address] [place] [address]

## 2023-08-08 NOTE — PROGRESS NOTES
Assessment and Plan:     Problem List Items Addressed This Visit        Cardiovascular and Mediastinum    Chronic venous hypertension (idiopathic) with ulcer of right lower extremity ([address])       Nervous and Auditory    Dementia ([address])   Other Visit Diagnoses     Medicare annual wellness visit, subsequent    -  Primary    Abnormal CBC        Relevant Orders    CBC and differential        Repeat CBC in 2-4 weeks to monitor; if persistently abnormal, could consider Heme/Onc eval        Preventive health issues were discussed with patient, and age appropriate screening tests were ordered as noted in patient's After Visit Summary. Personalized health advice and appropriate referrals for health education or preventive services given if needed, as noted in patient's After Visit Summary. History of Present Illness:     Patient presents with her niece for lab review and a Medicare Wellness Visit    HPI     TSH WNL   B12  899  Lipids: Total 197, LDL 88, , TG 38; Bili 1.1, otherwise LFTs WNL   Cr 0.78/GFR 69  Glucose 92   WBC, RBC, Platelets decreased    Her family moved her downstairs -- she is all on one level now and doing much better   RLE has no further open wound, she is elevating her legs/wearing compression stockings     Patient Care Team:  Ralph Larkin DO as PCP - General (Family Medicine)     Review of Systems:     Review of Systems   Constitutional: Negative for unexpected weight change. HENT: Negative for congestion, ear pain, rhinorrhea and sore throat. Eyes: Negative for visual disturbance. Respiratory: Negative for cough and shortness of breath. Cardiovascular: Negative for chest pain, palpitations and leg swelling. Gastrointestinal: Negative for abdominal pain, constipation and diarrhea. Endocrine: Negative for polyuria. Genitourinary: Negative for dysuria. Neurological: Negative for dizziness and headaches. Psychiatric/Behavioral: Negative for sleep disturbance. Problem List:     Patient Active Problem List   Diagnosis   • Chronic venous hypertension (idiopathic) with ulcer of right lower extremity (HCC)   • Patient is Restorationist   • Dementia ([address])   • Body mass index (BMI) of 19 or less in adult      Past Medical and Surgical History:     Past Medical History:   Diagnosis Date   • Cellulitis of right lower extremity 2022   • Dementia ([address])    • Memory loss    • Mild protein-calorie malnutrition ([address])  2021     Past Surgical History:   Procedure Laterality Date   • ANKLE FRACTURE SURGERY        Family History:     Family History   Problem Relation Age of Onset   • Dementia Sister         Suri's sister is    • Cancer Father       Social History:     Social History     Socioeconomic History   • Marital status:      Spouse name: None   • Number of children: None   • Years of education: None   • Highest education level: None   Occupational History   • None   Tobacco Use   • Smoking status: Never   • Smokeless tobacco: Never   Vaping Use   • Vaping Use: Never used   Substance and Sexual Activity   • Alcohol use: Not Currently   • Drug use: No   • Sexual activity: Not Currently     Partners: Male     Birth control/protection: None   Other Topics Concern   • None   Social History Narrative    Lives alone      Social Determinants of Health     Financial Resource Strain: Low Risk  (2023)    Overall Financial Resource Strain (CARDIA)    • Difficulty of Paying Living Expenses: Not hard at all   Food Insecurity: No Food Insecurity (2022)    Hunger Vital Sign    • Worried About Running Out of Food in the Last Year: Never true    • Ran Out of Food in the Last Year: Never true   Transportation Needs: No Transportation Needs (2023)    PRAPARE - Transportation    • Lack of Transportation (Medical): No    • Lack of Transportation (Non-Medical):  No   Physical Activity: Not on file   Stress: Not on file   Social Connections: Not on file Intimate Partner Violence: Not on file   Housing Stability: Low Risk  (6/23/2022)    Housing Stability Vital Sign    • Unable to Pay for Housing in the Last Year: No    • Number of Places Lived in the Last Year: 1    • Unstable Housing in the Last Year: No      Medications and Allergies:     Current Outpatient Medications   Medication Sig Dispense Refill   • Cod Liver Oil 1000 MG CAPS Take by mouth     • Multiple Vitamins-Minerals (ONE-A-DAY WOMENS 50 PLUS PO) Take by mouth     • NON FORMULARY SPM     • NON FORMULARY TUDCA     • NON FORMULARY PQQ     • Probiotic Product (PROBIOTIC-10 PO) Take by mouth       No current facility-administered medications for this visit. Allergies   Allergen Reactions   • Prednisone Other (See Comments)     Insomnia, Delirium/Anger -- was given oral steroid for rash while hospitalized, required 1:1 and restraints       Immunizations:     Immunization History   Administered Date(s) Administered   • COVID-19 MODERNA VACC 0.5 ML IM 09/16/2021, 10/19/2021      Health Maintenance: There are no preventive care reminders to display for this patient. Topic Date Due   • Pneumococcal Vaccine: 65+ Years (1 - PCV) Never done   • COVID-19 Vaccine (3 - Moderna series) 12/14/2021   • Influenza Vaccine (1) 09/01/2023      Medicare Screening Tests and Risk Assessments:     Nima Cherry is here for her Subsequent Wellness visit. Health Risk Assessment:   Patient rates overall health as excellent. Patient feels that their physical health rating is slightly better. Patient is satisfied with their life. Eyesight was rated as same. Hearing was rated as same. Patient feels that their emotional and mental health rating is slightly better. Patients states they are never, rarely angry. Patient states they are never, rarely unusually tired/fatigued. Pain experienced in the last 7 days has been none. Patient states that she has experienced no weight loss or gain in last 6 months.      Depression Screening:   PHQ-2 Score: 0      Fall Risk Screening: In the past year, patient has experienced: no history of falling in past year      Urinary Incontinence Screening:   Patient has leaked urine accidently in the last six months. MIld    Home Safety:  Patient does not have trouble with stairs inside or outside of their home. Patient has working smoke alarms and has working carbon monoxide detector. Home safety hazards include: none. Nutrition:   Current diet is Regular and Other (please comment). Dairy free, Grain Free and Gluten Free    Medications:   Patient is not currently taking any over-the-counter supplements. Patient is not able to manage medications. Activities of Daily Living (ADLs)/Instrumental Activities of Daily Living (IADLs):   Walk and transfer into and out of bed and chair?: No  Dress and groom yourself?: No    Bathe or shower yourself?: No    Feed yourself?  Yes  Do your laundry/housekeeping?: No  Manage your money, pay your bills and track your expenses?: No  Make your own meals?: No    Do your own shopping?: No    Durable Medical Equipment Suppliers  N/A    Previous Hospitalizations:   Any hospitalizations or ED visits within the last 12 months?: No    How many hospitalizations have you had in the last year?: 1-2    Advance Care Planning:   Living will: Yes    Advanced directive: Yes      Cognitive Screening:   Provider or family/friend/caregiver concerned regarding cognition?: Yes    Cognition Comments: "It's a slippery slope"   "Maintaining"   Added some new supplements     PREVENTIVE SCREENINGS      Cardiovascular Screening:    General: Screening Current      Diabetes Screening:     General: Screening Current      Colorectal Cancer Screening:     General: Screening Not Indicated      Breast Cancer Screening:     General: Screening Not Indicated      Cervical Cancer Screening:    General: Screening Not Indicated      Osteoporosis Screening:    General: Screening Not Indicated Abdominal Aortic Aneurysm (AAA) Screening:        General: Screening Not Indicated      Lung Cancer Screening:     General: Screening Not Indicated      Hepatitis C Screening:    General: Screening Not Indicated    Screening, Brief Intervention, and Referral to Treatment (SBIRT)    Screening  Typical number of drinks in a day: 0  Typical number of drinks in a week: 0  Interpretation: Low risk drinking behavior. Single Item Drug Screening:  How often have you used an illegal drug (including marijuana) or a prescription medication for non-medical reasons in the past year? never    Single Item Drug Screen Score: 0  Interpretation: Negative screen for possible drug use disorder    No results found. Physical Exam:     /76   Pulse 71   Temp (!) 97.2 °F (36.2 °C)   Ht 5' 9" (1.753 m)   Wt 59 kg (130 lb)   SpO2 97%   BMI 19.20 kg/m²     Physical Exam  Vitals and nursing note reviewed. Constitutional:       General: She is not in acute distress. Appearance: She is well-developed. HENT:      Head: Normocephalic and atraumatic. Right Ear: Tympanic membrane, ear canal and external ear normal.      Left Ear: Tympanic membrane, ear canal and external ear normal.      Nose: Nose normal. No rhinorrhea. Mouth/Throat:      Mouth: Mucous membranes are moist.      Pharynx: No oropharyngeal exudate or posterior oropharyngeal erythema. Eyes:      Conjunctiva/sclera: Conjunctivae normal.   Neck:      Thyroid: No thyromegaly. Cardiovascular:      Rate and Rhythm: Normal rate and regular rhythm. Pulmonary:      Effort: Pulmonary effort is normal. No respiratory distress. Breath sounds: Normal breath sounds. Abdominal:      General: Bowel sounds are normal. There is no distension. Palpations: Abdomen is soft. Tenderness: There is no abdominal tenderness. Musculoskeletal:         General: Normal range of motion. Lymphadenopathy:      Cervical: No cervical adenopathy.    Skin: General: Skin is warm and dry. Comments: Venous stasis skin changes (pink, shiny, skin peeling) on R shin without open wound   Neurological:      Mental Status: She is alert. Mental status is at baseline.    Psychiatric:         Mood and Affect: Mood normal.          Aline Vincent, DO

## 2023-08-21 ENCOUNTER — APPOINTMENT (EMERGENCY)
Dept: CT IMAGING | Facility: HOSPITAL | Age: 83
End: 2023-08-21
Payer: MEDICARE

## 2023-08-21 ENCOUNTER — HOSPITAL ENCOUNTER (EMERGENCY)
Facility: HOSPITAL | Age: 83
Discharge: HOME | End: 2023-08-21
Attending: EMERGENCY MEDICINE
Payer: MEDICARE

## 2023-08-21 VITALS
RESPIRATION RATE: 18 BRPM | DIASTOLIC BLOOD PRESSURE: 60 MMHG | HEART RATE: 56 BPM | OXYGEN SATURATION: 100 % | TEMPERATURE: 97.2 F | SYSTOLIC BLOOD PRESSURE: 131 MMHG

## 2023-08-21 DIAGNOSIS — K59.00 CONSTIPATION: ICD-10-CM

## 2023-08-21 DIAGNOSIS — R10.9 LEFT FLANK PAIN: Primary | ICD-10-CM

## 2023-08-21 LAB
ALBUMIN SERPL BCP-MCNC: 4.5 G/DL (ref 3.5–5)
ALP SERPL-CCNC: 79 U/L (ref 34–104)
ALT SERPL W P-5'-P-CCNC: 14 U/L (ref 7–52)
ANION GAP SERPL CALCULATED.3IONS-SCNC: 6 MMOL/L
AST SERPL W P-5'-P-CCNC: 24 U/L (ref 13–39)
ATRIAL RATE: 45 BPM
BASOPHILS # BLD AUTO: 0.06 THOUSANDS/ÂΜL (ref 0–0.1)
BASOPHILS NFR BLD AUTO: 1 % (ref 0–1)
BILIRUB SERPL-MCNC: 1.12 MG/DL (ref 0.2–1)
BILIRUB UR QL STRIP: NEGATIVE
BUN SERPL-MCNC: 20 MG/DL (ref 5–25)
CALCIUM SERPL-MCNC: 10.1 MG/DL (ref 8.4–10.2)
CHLORIDE SERPL-SCNC: 107 MMOL/L (ref 96–108)
CLARITY UR: CLEAR
CO2 SERPL-SCNC: 26 MMOL/L (ref 21–32)
COLOR UR: COLORLESS
CREAT SERPL-MCNC: 0.68 MG/DL (ref 0.6–1.3)
EOSINOPHIL # BLD AUTO: 0.03 THOUSAND/ÂΜL (ref 0–0.61)
EOSINOPHIL NFR BLD AUTO: 1 % (ref 0–6)
ERYTHROCYTE [DISTWIDTH] IN BLOOD BY AUTOMATED COUNT: 14.7 % (ref 11.6–15.1)
GFR SERPL CREATININE-BSD FRML MDRD: 80 ML/MIN/1.73SQ M
GLUCOSE SERPL-MCNC: 97 MG/DL (ref 65–140)
GLUCOSE UR STRIP-MCNC: NEGATIVE MG/DL
HCT VFR BLD AUTO: 39.6 % (ref 34.8–46.1)
HGB BLD-MCNC: 13.5 G/DL (ref 11.5–15.4)
HGB UR QL STRIP.AUTO: NEGATIVE
IMM GRANULOCYTES # BLD AUTO: 0.02 THOUSAND/UL (ref 0–0.2)
IMM GRANULOCYTES NFR BLD AUTO: 0 % (ref 0–2)
KETONES UR STRIP-MCNC: NEGATIVE MG/DL
LEUKOCYTE ESTERASE UR QL STRIP: NEGATIVE
LYMPHOCYTES # BLD AUTO: 0.88 THOUSANDS/ÂΜL (ref 0.6–4.47)
LYMPHOCYTES NFR BLD AUTO: 14 % (ref 14–44)
MCH RBC QN AUTO: 34.4 PG (ref 26.8–34.3)
MCHC RBC AUTO-ENTMCNC: 34.1 G/DL (ref 31.4–37.4)
MCV RBC AUTO: 101 FL (ref 82–98)
MONOCYTES # BLD AUTO: 0.54 THOUSAND/ÂΜL (ref 0.17–1.22)
MONOCYTES NFR BLD AUTO: 9 % (ref 4–12)
NEUTROPHILS # BLD AUTO: 4.83 THOUSANDS/ÂΜL (ref 1.85–7.62)
NEUTS SEG NFR BLD AUTO: 75 % (ref 43–75)
NITRITE UR QL STRIP: NEGATIVE
NRBC BLD AUTO-RTO: 0 /100 WBCS
P AXIS: -7 DEGREES
PH UR STRIP.AUTO: 7.5 [PH]
PLATELET # BLD AUTO: 152 THOUSANDS/UL (ref 149–390)
PMV BLD AUTO: 10.8 FL (ref 8.9–12.7)
POTASSIUM SERPL-SCNC: 4 MMOL/L (ref 3.5–5.3)
PR INTERVAL: 170 MS
PROT SERPL-MCNC: 7 G/DL (ref 6.4–8.4)
PROT UR STRIP-MCNC: NEGATIVE MG/DL
QRS AXIS: 66 DEGREES
QRSD INTERVAL: 88 MS
QT INTERVAL: 518 MS
QTC INTERVAL: 448 MS
RBC # BLD AUTO: 3.92 MILLION/UL (ref 3.81–5.12)
SODIUM SERPL-SCNC: 139 MMOL/L (ref 135–147)
SP GR UR STRIP.AUTO: 1.01 (ref 1–1.03)
T WAVE AXIS: 63 DEGREES
UROBILINOGEN UR STRIP-ACNC: <2 MG/DL
VENTRICULAR RATE: 45 BPM
WBC # BLD AUTO: 6.36 THOUSAND/UL (ref 4.31–10.16)

## 2023-08-21 PROCEDURE — 93005 ELECTROCARDIOGRAM TRACING: CPT

## 2023-08-21 PROCEDURE — 93010 ELECTROCARDIOGRAM REPORT: CPT | Performed by: INTERNAL MEDICINE

## 2023-08-21 PROCEDURE — 99285 EMERGENCY DEPT VISIT HI MDM: CPT | Performed by: EMERGENCY MEDICINE

## 2023-08-21 PROCEDURE — 81003 URINALYSIS AUTO W/O SCOPE: CPT | Performed by: EMERGENCY MEDICINE

## 2023-08-21 PROCEDURE — 80053 COMPREHEN METABOLIC PANEL: CPT | Performed by: EMERGENCY MEDICINE

## 2023-08-21 PROCEDURE — 99284 EMERGENCY DEPT VISIT MOD MDM: CPT

## 2023-08-21 PROCEDURE — 36415 COLL VENOUS BLD VENIPUNCTURE: CPT | Performed by: EMERGENCY MEDICINE

## 2023-08-21 PROCEDURE — 74177 CT ABD & PELVIS W/CONTRAST: CPT

## 2023-08-21 PROCEDURE — 71260 CT THORAX DX C+: CPT

## 2023-08-21 PROCEDURE — 85025 COMPLETE CBC W/AUTO DIFF WBC: CPT | Performed by: EMERGENCY MEDICINE

## 2023-08-21 RX ORDER — LIDOCAINE 50 MG/G
1 PATCH TOPICAL ONCE
Status: DISCONTINUED | OUTPATIENT
Start: 2023-08-21 | End: 2023-08-21 | Stop reason: HOSPADM

## 2023-08-21 RX ADMIN — LIDOCAINE 5% 1 PATCH: 700 PATCH TOPICAL at 12:02

## 2023-08-21 RX ADMIN — IOHEXOL 100 ML: 350 INJECTION, SOLUTION INTRAVENOUS at 13:13

## 2023-08-21 NOTE — ED PROVIDER NOTES
Pt Name: Rebecca Arroyo  MRN: [id number]  [address] 1940  Age/Sex: 78 y.o. female  Date of evaluation: 8/21/2023  PCP: Vanessa Tirado    Chief Complaint   Patient presents with   • Abdominal Pain     Pt presents with c/o L abdominal pain/flank pain, pain began this morning. Denies N/V/D. HPI and MDM    78 y.o. female presenting with flank pain that patient started complaining about this morning. Caregivers are present with patient. States that a week or so ago she also complained of some flank pain however she drank some water and then it resolved. She has a history of kidney stones. No known falls but caregivers are uncertain. Patient is overall a poor historian. No urinary symptoms. No fevers or chills. No blood in her urine. No known trauma. Differential diagnosis considered includes but not limited to ureterolithiasis, pyelonephritis, UTI, muscular strain, rib fracture. Per my independent interpretation of EKG, normal sinus rhythm heart of 72, narrow QRS, intervals reassuring, no STEMI. Patient has had intermittent bradycardia however asymptomatic, blood pressure has been reassuring, obtained EKG, shows sinus bradycardia with heart rate 45, narrow QRS, no was reassuring, no AV ryan block noted. Blood work is reassuring. CT scan results as below. Patient and caregivers updated with results. Advised supportive care, advised increasing fluid intake and starting laxative. Advised PCP follow-up, return precautions were discussed.             Medications   lidocaine (LIDODERM) 5 % patch 1 patch (1 patch Topical Medication Applied 8/21/23 1202)   iohexol (OMNIPAQUE) 350 MG/ML injection (SINGLE-DOSE) 100 mL (100 mL Intravenous Given 8/21/23 1313)         Past Medical and Surgical History    Past Medical History:   Diagnosis Date   • Cellulitis of right lower extremity 6/21/2022   • Dementia [facility])    • Memory loss 2018   • Mild protein-calorie malnutrition ([facility])  2021       Past Surgical History:   Procedure Laterality Date   • ANKLE FRACTURE SURGERY         Family History   Problem Relation Age of Onset   • Dementia Sister         Suri's sister is    • Cancer Father        Social History     Tobacco Use   • Smoking status: Never   • Smokeless tobacco: Never   Vaping Use   • Vaping Use: Never used   Substance Use Topics   • Alcohol use: Not Currently   • Drug use: No           Allergies    Allergies   Allergen Reactions   • Prednisone Other (See Comments)     Insomnia, Delirium/Anger -- was given oral steroid for rash while hospitalized, required 1:1 and restraints        Home Medications    Prior to Admission medications    Medication Sig Start Date End Date Taking? Authorizing Provider   Cod Liver Oil 1000 MG CAPS Take by mouth    Historical Provider, MD   Multiple Vitamins-Minerals (ONE-A-DAY WOMENS 50 PLUS PO) Take by mouth    Historical Provider, MD   NON FORMULARY SPM    Historical Provider, MD   NON FORMULARY [facility]    Historical Provider, MD   NON FORMULARY PQQ    Historical Provider, MD   Probiotic Product (PROBIOTIC-10 PO) Take by mouth    Historical Provider, MD           Physical Exam      ED Triage Vitals [23 1003]   Temperature Pulse Respirations Blood Pressure SpO2   (!) 97.2 °F (36.2 °C) 64 20 139/61 98 %      Temp Source Heart Rate Source Patient Position - Orthostatic VS BP Location FiO2 (%)   Temporal Monitor Sitting Left arm --      Pain Score       --               Physical Exam  Constitutional:       General: She is not in acute distress. Appearance: She is not ill-appearing. HENT:      Head: Normocephalic and atraumatic. Nose: Nose normal.      Mouth/Throat:      Mouth: Mucous membranes are moist.   Eyes:      Extraocular Movements: Extraocular movements intact. Pupils: Pupils are equal, round, and reactive to light. Cardiovascular:      Rate and Rhythm: Normal rate and regular rhythm.    Pulmonary: Effort: No respiratory distress. Breath sounds: Normal breath sounds. No wheezing. Comments: Patient has left lower anterolateral ribs tender to outpatient. Abdominal:      General: There is no distension. Palpations: Abdomen is soft. Tenderness: There is no abdominal tenderness. Comments: No CVA tenderness   Musculoskeletal:         General: No swelling or deformity. Normal range of motion. Cervical back: Normal range of motion and neck supple. Skin:     General: Skin is warm. Findings: No erythema. Comments: No shingles rash or rash otherwise noted in the area of pain   Neurological:      Mental Status: She is alert and oriented to person, place, and time. Mental status is at baseline.               Diagnostic Results      Labs:    Results Reviewed     Procedure Component Value Units Date/Time    Comprehensive metabolic panel [[id number]]  (Abnormal) Collected: 08/21/23 1205    Lab Status: Final result Specimen: Blood from Arm, Right Updated: 08/21/23 1243     Sodium 139 mmol/L      Potassium 4.0 mmol/L      Chloride 107 mmol/L      CO2 26 mmol/L      ANION GAP 6 mmol/L      BUN 20 mg/dL      Creatinine 0.68 mg/dL      Glucose 97 mg/dL      Calcium 10.1 mg/dL      AST 24 U/L      ALT 14 U/L      Alkaline Phosphatase 79 U/L      Total Protein 7.0 g/dL      Albumin 4.5 g/dL      Total Bilirubin 1.12 mg/dL      eGFR 80 ml/min/1.73sq m     Narrative:      [place] guidelines for Chronic Kidney Disease (CKD):   •  Stage 1 with normal or high GFR (GFR > 90 mL/min/1.73 square meters)  •  Stage 2 Mild CKD (GFR = 60-89 mL/min/1.73 square meters)  •  Stage 3A Moderate CKD (GFR = 45-59 mL/min/1.73 square meters)  •  Stage 3B Moderate CKD (GFR = 30-44 mL/min/1.73 square meters)  •  Stage 4 Severe CKD (GFR = 15-29 mL/min/1.73 square meters)  •  Stage 5 End Stage CKD (GFR <15 mL/min/1.73 square meters)  Note: GFR calculation is accurate only with a steady state creatinine    UA w Reflex to Microscopic w Reflex to Culture [[id number]] Collected: 08/21/23 1215    Lab Status: Final result Specimen: Urine, Clean Catch Updated: 08/21/23 1241     Color, UA Colorless     Clarity, UA Clear     Specific Gravity, UA 1.008     pH, UA 7.5     Leukocytes, UA Negative     Nitrite, UA Negative     Protein, UA Negative mg/dl      Glucose, UA Negative mg/dl      Ketones, UA Negative mg/dl      Urobilinogen, UA <2.0 mg/dl      Bilirubin, UA Negative     Occult Blood, UA Negative    CBC and differential [[id number]]  (Abnormal) Collected: 08/21/23 1205    Lab Status: Final result Specimen: Blood from Arm, Right Updated: 08/21/23 1228     WBC 6.36 Thousand/uL      RBC 3.92 Million/uL      Hemoglobin 13.5 g/dL      Hematocrit 39.6 %       fL      MCH 34.4 pg      MCHC 34.1 g/dL      RDW 14.7 %      MPV 10.8 fL      Platelets 798 Thousands/uL      nRBC 0 /100 WBCs      Neutrophils Relative 75 %      Immat GRANS % 0 %      Lymphocytes Relative 14 %      Monocytes Relative 9 %      Eosinophils Relative 1 %      Basophils Relative 1 %      Neutrophils Absolute 4.83 Thousands/µL      Immature Grans Absolute 0.02 Thousand/uL      Lymphocytes Absolute 0.88 Thousands/µL      Monocytes Absolute 0.54 Thousand/µL      Eosinophils Absolute 0.03 Thousand/µL      Basophils Absolute 0.06 Thousands/µL           All labs reviewed and utilized in the medical decision making process    Radiology:    CT chest abdomen pelvis w contrast   Final Result      No acute process in the chest, abdomen, and pelvis. Moderate colonic stool burden in the ascending colon, which may be due to colonic hypomotility. Patulous esophagus, which may be due to esophageal dysmotility.             Workstation performed: [id number]             All radiology studies independently viewed by me and interpreted by the radiologist.    Procedure    Procedures        FINAL IMPRESSION    Final diagnoses:   Left flank pain Constipation         DISPOSITION    Time reflects when diagnosis was documented in both MDM as applicable and the Disposition within this note     Time User Action Codes Description Comment    8/21/2023  3:48 PM Jane Jolley Add [R10.9] Left flank pain     8/21/2023  3:48 PM Jane Jolley Add [K59.00] Constipation       ED Disposition     ED Disposition   Discharge    Condition   Stable    Date/Time   Mon Aug 21, 2023  3:48 PM    Comment   Florecita Rodas discharge to home/self care. Follow-up Information     Follow up With Specialties Details Why [address],  Family Medicine Call     [address] [place] [address] Road  [phone]              PATIENT REFERRED TO:    Jameson Lieberman     [address]  [phone]    Call         DISCHARGE MEDICATIONS:    Discharge Medication List as of 8/21/2023  3:49 PM      CONTINUE these medications which have NOT CHANGED    Details   Cod Liver Oil 1000 MG CAPS Take by mouth, Historical Med      Multiple Vitamins-Minerals (ONE-A-DAY WOMENS 50 PLUS PO) Take by mouth, Historical Med      !! NON FORMULARY SPM, Historical Med      !! NON FORMULARY TUDCA, Historical Med      !! NON FORMULARY PQQ, Historical Med      Probiotic Product (PROBIOTIC-10 PO) Take by mouth, Historical Med       !! - Potential duplicate medications found. Please discuss with provider. No discharge procedures on file. Mary Love DO        This note was partially completed using voice recognition technology, and was scanned for gross errors; however some errors may still exist. Please contact the author with any questions or requests for clarification.       Mary Love DO  08/21/23 3482

## 2023-09-12 DIAGNOSIS — R32 URINARY INCONTINENCE, UNSPECIFIED TYPE: Primary | ICD-10-CM

## 2023-09-22 ENCOUNTER — APPOINTMENT (OUTPATIENT)
Dept: LAB | Facility: CLINIC | Age: 83
End: 2023-09-22
Payer: MEDICARE

## 2023-09-22 DIAGNOSIS — R79.89 ABNORMAL CBC: ICD-10-CM

## 2023-09-22 LAB
BASOPHILS # BLD AUTO: 0.07 THOUSANDS/ÂΜL (ref 0–0.1)
BASOPHILS NFR BLD AUTO: 2 % (ref 0–1)
EOSINOPHIL # BLD AUTO: 0.11 THOUSAND/ÂΜL (ref 0–0.61)
EOSINOPHIL NFR BLD AUTO: 3 % (ref 0–6)
ERYTHROCYTE [DISTWIDTH] IN BLOOD BY AUTOMATED COUNT: 14.5 % (ref 11.6–15.1)
HCT VFR BLD AUTO: 41.8 % (ref 34.8–46.1)
HGB BLD-MCNC: 13.7 G/DL (ref 11.5–15.4)
IMM GRANULOCYTES # BLD AUTO: 0.02 THOUSAND/UL (ref 0–0.2)
IMM GRANULOCYTES NFR BLD AUTO: 1 % (ref 0–2)
LYMPHOCYTES # BLD AUTO: 1.06 THOUSANDS/ÂΜL (ref 0.6–4.47)
LYMPHOCYTES NFR BLD AUTO: 26 % (ref 14–44)
MCH RBC QN AUTO: 34.6 PG (ref 26.8–34.3)
MCHC RBC AUTO-ENTMCNC: 32.8 G/DL (ref 31.4–37.4)
MCV RBC AUTO: 106 FL (ref 82–98)
MONOCYTES # BLD AUTO: 0.41 THOUSAND/ÂΜL (ref 0.17–1.22)
MONOCYTES NFR BLD AUTO: 10 % (ref 4–12)
NEUTROPHILS # BLD AUTO: 2.47 THOUSANDS/ÂΜL (ref 1.85–7.62)
NEUTS SEG NFR BLD AUTO: 58 % (ref 43–75)
NRBC BLD AUTO-RTO: 0 /100 WBCS
PLATELET # BLD AUTO: 142 THOUSANDS/UL (ref 149–390)
PMV BLD AUTO: 11.5 FL (ref 8.9–12.7)
RBC # BLD AUTO: 3.96 MILLION/UL (ref 3.81–5.12)
WBC # BLD AUTO: 4.14 THOUSAND/UL (ref 4.31–10.16)

## 2023-09-22 PROCEDURE — 36415 COLL VENOUS BLD VENIPUNCTURE: CPT

## 2023-09-22 PROCEDURE — 85025 COMPLETE CBC W/AUTO DIFF WBC: CPT

## 2023-11-15 NOTE — PROGRESS NOTES
Jessica Forte 1940 female MRN: [id number]      ASSESSMENT/PLAN  Problem List Items Addressed This Visit    None  Visit Diagnoses     Rash    -  Primary    Relevant Medications    clotrimazole-betamethasone (LOTRISONE) 1-0.05 % cream        Circular appearance of rash and onset in summer when sweater more suggestive of fungal etiology. Will trial Lotrisone (allergy to prednisone listed is because pt was "hyper" with oral steroid). To call if not tolerating prior. No future appointments. SUBJECTIVE  CC: Rash (Since end of summer)      HPI:  Jessica Forte is a 78 y.o. female who presents with her niece due to rash. On upper back  Has been present since summer   Pruritic   Has been using lotion without relief   No new exposures       Review of Systems   Skin:  Positive for rash.        Historical Information   The patient history was reviewed and updated as follows:    Past Medical History:   Diagnosis Date   • Cellulitis of right lower extremity 2022   • Dementia ([address])    • Memory loss    • Mild protein-calorie malnutrition ([address])  2021     Past Surgical History:   Procedure Laterality Date   • ANKLE FRACTURE SURGERY       Family History   Problem Relation Age of Onset   • Dementia Sister         Suri's sister is    • Cancer Father       Social History   Social History     Substance and Sexual Activity   Alcohol Use Not Currently     Social History     Substance and Sexual Activity   Drug Use No     Social History     Tobacco Use   Smoking Status Never   Smokeless Tobacco Never       Medications:     Current Outpatient Medications:   •  clotrimazole-betamethasone (LOTRISONE) 1-0.05 % cream, Apply topically 2 (two) times a day, Disp: 45 g, Rfl: 0  •  Cod Liver Oil 1000 MG CAPS, Take by mouth, Disp: , Rfl:   •  Incontinence Supplies (Wings Incontinence Pants S/M) MISC, Use 6 (six) times a day, Disp: 200 each, Rfl: 5  •  Multiple Vitamins-Minerals (ONE-A-DAY WOMENS 50 PLUS PO), Take by mouth, Disp: , Rfl:   •  NON FORMULARY, SPM, Disp: , Rfl:   •  NON FORMULARY, TUDCA, Disp: , Rfl:   •  NON FORMULARY, PQQ, Disp: , Rfl:   •  Probiotic Product (PROBIOTIC-10 PO), Take by mouth, Disp: , Rfl:   Allergies   Allergen Reactions   • Prednisone Other (See Comments)     Insomnia, Delirium/Anger -- was given oral steroid for rash while hospitalized, required 1:1 and restraints        OBJECTIVE    Vitals:   Vitals:    11/16/23 1430   BP: 102/64   BP Location: Left arm   Patient Position: Sitting   Cuff Size: Large   Pulse: 90   Temp: 98.4 °F (36.9 °C)   SpO2: 96%   Weight: 60.3 kg (133 lb)   Height: 5' 9" (1.753 m)           Physical Exam  Vitals and nursing note reviewed. Constitutional:       General: She is not in acute distress. Appearance: Normal appearance. HENT:      Head: Normocephalic and atraumatic. Pulmonary:      Effort: Pulmonary effort is normal. No respiratory distress. Skin:     Comments: Scattered circular erythematous flat lesions, some with central skin sloughing, ranging in size from sub-centimeter to 1.5 cm along upper back   One similar lesion on R inner knee   Neurological:      Mental Status: She is alert. Mental status is at baseline.    Psychiatric:         Mood and Affect: Mood normal.                  DO [facility]   11/16/2023  2:44 PM

## 2023-11-16 ENCOUNTER — OFFICE VISIT (OUTPATIENT)
Dept: FAMILY MEDICINE CLINIC | Facility: CLINIC | Age: 83
End: 2023-11-16
Payer: MEDICARE

## 2023-11-16 VITALS
TEMPERATURE: 98.4 F | OXYGEN SATURATION: 96 % | SYSTOLIC BLOOD PRESSURE: 102 MMHG | DIASTOLIC BLOOD PRESSURE: 64 MMHG | BODY MASS INDEX: 19.7 KG/M2 | HEART RATE: 90 BPM | WEIGHT: 133 LBS | HEIGHT: 69 IN

## 2023-11-16 DIAGNOSIS — R21 RASH: Primary | ICD-10-CM

## 2023-11-16 PROCEDURE — 99213 OFFICE O/P EST LOW 20 MIN: CPT | Performed by: FAMILY MEDICINE

## 2023-11-16 RX ORDER — CLOTRIMAZOLE AND BETAMETHASONE DIPROPIONATE 10; .64 MG/G; MG/G
CREAM TOPICAL 2 TIMES DAILY
Qty: 45 G | Refills: 0 | Status: SHIPPED | OUTPATIENT
Start: 2023-11-16

## 2023-11-20 ENCOUNTER — OFFICE VISIT (OUTPATIENT)
Dept: FAMILY MEDICINE CLINIC | Facility: CLINIC | Age: 83
End: 2023-11-20
Payer: MEDICARE

## 2023-11-20 VITALS
HEART RATE: 76 BPM | OXYGEN SATURATION: 96 % | DIASTOLIC BLOOD PRESSURE: 80 MMHG | BODY MASS INDEX: 19.58 KG/M2 | SYSTOLIC BLOOD PRESSURE: 130 MMHG | WEIGHT: 132.6 LBS | TEMPERATURE: 98.6 F

## 2023-11-20 DIAGNOSIS — R21 RASH: Primary | ICD-10-CM

## 2023-11-20 PROCEDURE — 99213 OFFICE O/P EST LOW 20 MIN: CPT | Performed by: FAMILY MEDICINE

## 2023-11-20 NOTE — PROGRESS NOTES
Elza Hamilton 1940 female MRN: [id number]      ASSESSMENT/PLAN  Problem List Items Addressed This Visit    None  Visit Diagnoses     Rash    -  Primary          Rash does not appear cellulitic in nature, though reviewed signs & symptoms to monitor for (increased redness, warmth, pain, spreading of rash). Does appear somewhat reactive in nature, possibly to something that touched her skin -- encouraged trial of topical steroid and monitor. To call if symptoms persist/worsen. Of note, pt does have history of LE ulcer due to venous changes, has varicosities on both LE. No future appointments. SUBJECTIVE  CC: Rash (Running up the front of both legs, can be itchy and irritated at times, could be an allergic reaction )      HPI:  Elza Hamilton is a 78 y.o. female who presents with her niece due to LE rash. Onset this AM -- pruritic pink rash on B/L LE   Recently started Lotrisone for rash on upper back, wondering if it is a reaction to that   Pt is outside a great deal so unsure if she was exposed to something   Pt and niece feel it is already improved from earlier today     Review of Systems   Skin:  Positive for rash.        Historical Information   The patient history was reviewed and updated as follows:    Past Medical History:   Diagnosis Date   • Cellulitis of right lower extremity 2022   • Dementia ([address])    • Memory loss    • Mild protein-calorie malnutrition ([address])  2021     Past Surgical History:   Procedure Laterality Date   • ANKLE FRACTURE SURGERY       Family History   Problem Relation Age of Onset   • Dementia Sister         Suri's sister is    • Cancer Father       Social History   Social History     Substance and Sexual Activity   Alcohol Use Not Currently     Social History     Substance and Sexual Activity   Drug Use No     Social History     Tobacco Use   Smoking Status Never   Smokeless Tobacco Never       Medications:     Current Outpatient Medications:   •  clotrimazole-betamethasone (LOTRISONE) 1-0.05 % cream, Apply topically 2 (two) times a day, Disp: 45 g, Rfl: 0  •  Cod Liver Oil 1000 MG CAPS, Take by mouth, Disp: , Rfl:   •  Incontinence Supplies (Wings Incontinence Pants S/M) MISC, Use 6 (six) times a day, Disp: 200 each, Rfl: 5  •  Multiple Vitamins-Minerals (ONE-A-DAY WOMENS 50 PLUS PO), Take by mouth, Disp: , Rfl:   •  NON FORMULARY, SPM, Disp: , Rfl:   •  NON FORMULARY, TUDCA, Disp: , Rfl:   •  NON FORMULARY, PQQ, Disp: , Rfl:   •  Probiotic Product (PROBIOTIC-10 PO), Take by mouth, Disp: , Rfl:   Allergies   Allergen Reactions   • Prednisone Other (See Comments)     Insomnia, Delirium/Anger -- was given oral steroid for rash while hospitalized, required 1:1 and restraints        OBJECTIVE    Vitals:   Vitals:    11/20/23 1519   BP: 130/80   Pulse: 76   Temp: 98.6 °F (37 °C)   SpO2: 96%   Weight: 60.1 kg (132 lb 9.6 oz)           Physical Exam  Vitals and nursing note reviewed. Constitutional:       General: She is not in acute distress. Appearance: Normal appearance. HENT:      Head: Normocephalic and atraumatic. Pulmonary:      Effort: Pulmonary effort is normal. No respiratory distress. Skin:     Comments: Scattered pink slightly raised lesions along B/L LE below knee and above mid-shin   Neurological:      Mental Status: She is alert. Mental status is at baseline.    Psychiatric:         Mood and Affect: Mood normal.                    DO [facility]   11/20/2023  4:13 PM

## 2024-01-09 ENCOUNTER — RA CDI HCC (OUTPATIENT)
Dept: OTHER | Facility: HOSPITAL | Age: 84
End: 2024-01-09

## 2024-01-15 NOTE — PROGRESS NOTES
Suri Patel 1940 female MRN: [id number]      ASSESSMENT/PLAN  Problem List Items Addressed This Visit        Cardiovascular and Mediastinum    Chronic venous hypertension (idiopathic) with ulcer of right lower extremity (HCC)       Nervous and Auditory    Dementia (HCC)   Other Visit Diagnoses     Rash    -  Primary    Relevant Orders    CBC and differential    Comprehensive metabolic panel    Anti-neutrophilic cytoplasmic antibody    JOSÉ MIGUEL Screen w/ Reflex to Titer/Pattern    Sedimentation rate, automated    C-reactive protein    C4 complement    C3 complement    Cryoglobulin    Ambulatory referral to Dermatology        Scattered blanching rash on LLE. Unclear etiology of rash, but appearance somewhat concerning for a vasculitic process. Discussed potential options for management including trial of oral steroid vs lab work to further evaluate for vasculitis vs referral to Derm. Pt's nieces feel that as it is not bothering her, they would like to hold off on steroid. They are agreeable to blood work, and, if no obvious abnormalities, would be open to Dermatology evaluation.     Stable RLE wound, continue local care   Pt at baseline mental status from dementia       No future appointments.           SUBJECTIVE  CC: Rash (Rash on left leg, getting bigger. Noticed a spot on her back and her right leg as well)      HPI:  Suri Patel is a 83 y.o. female who presents with her nieces due to rash.         Review of Systems   Skin:  Positive for rash.       Historical Information   The patient history was reviewed and updated as follows:    Past Medical History:   Diagnosis Date   • Cellulitis of right lower extremity 2022   • Dementia (HCC)    • Memory loss    • Mild protein-calorie malnutrition (HCC)  2021     Past Surgical History:   Procedure Laterality Date   • ANKLE FRACTURE SURGERY       Family History   Problem Relation Age of Onset   • Dementia Sister         Suri's sister is   "  • Cancer Father       Social History   Social History     Substance and Sexual Activity   Alcohol Use Not Currently     Social History     Substance and Sexual Activity   Drug Use No     Social History     Tobacco Use   Smoking Status Never   Smokeless Tobacco Never       Medications:     Current Outpatient Medications:   •  clotrimazole-betamethasone (LOTRISONE) 1-0.05 % cream, Apply topically 2 (two) times a day, Disp: 45 g, Rfl: 0  •  Cod Liver Oil 1000 MG CAPS, Take by mouth, Disp: , Rfl:   •  Incontinence Supplies (Wings Incontinence Pants S/M) MISC, Use 6 (six) times a day, Disp: 200 each, Rfl: 5  •  NON FORMULARY, SPM, Disp: , Rfl:   •  NON FORMULARY, TUDCA, Disp: , Rfl:   •  NON FORMULARY, PQQ, Disp: , Rfl:   •  Probiotic Product (PROBIOTIC-10 PO), Take by mouth, Disp: , Rfl:   Allergies   Allergen Reactions   • Prednisone Other (See Comments)     Insomnia, Delirium/Anger -- was given oral steroid for rash while hospitalized, required 1:1 and restraints        OBJECTIVE    Vitals:   Vitals:    01/16/24 1341   BP: 118/72   Pulse: 68   Temp: (!) 97.2 °F (36.2 °C)   SpO2: 98%   Weight: 59.9 kg (132 lb)   Height: 5' 9\" (1.753 m)           Physical Exam  Vitals and nursing note reviewed.   Constitutional:       General: She is not in acute distress.     Appearance: Normal appearance.   HENT:      Head: Normocephalic and atraumatic.   Pulmonary:      Effort: Pulmonary effort is normal. No respiratory distress.   Musculoskeletal:      Comments: Scattered beefy red, blanching, circular flat lesions on LLE of various sizes (largest approximately 1 cm). Some with overlying skin excoriation.   2 areas of circular scabbing on LLE -- one in popliteal fossa, the other on posterior upper calf.   Approximately 2 cm round raised dry skin lesion with border of erythema on medial R knee.   Known R shin wound, scant bloody drainage. Large area of chronic skin color change.    Neurological:      Mental Status: She is alert. " Mental status is at baseline.   Psychiatric:         Mood and Affect: Mood normal.                    Aline Vincent DO  [facility]   1/16/2024  2:17 PM

## 2024-01-16 ENCOUNTER — APPOINTMENT (OUTPATIENT)
Dept: LAB | Facility: CLINIC | Age: 84
End: 2024-01-16
Payer: MEDICARE

## 2024-01-16 ENCOUNTER — OFFICE VISIT (OUTPATIENT)
Dept: FAMILY MEDICINE CLINIC | Facility: CLINIC | Age: 84
End: 2024-01-16
Payer: MEDICARE

## 2024-01-16 ENCOUNTER — APPOINTMENT (OUTPATIENT)
Dept: LAB | Facility: HOSPITAL | Age: 84
End: 2024-01-16
Payer: MEDICARE

## 2024-01-16 VITALS
BODY MASS INDEX: 19.55 KG/M2 | HEART RATE: 68 BPM | TEMPERATURE: 97.2 F | OXYGEN SATURATION: 98 % | HEIGHT: 69 IN | SYSTOLIC BLOOD PRESSURE: 118 MMHG | DIASTOLIC BLOOD PRESSURE: 72 MMHG | WEIGHT: 132 LBS

## 2024-01-16 DIAGNOSIS — F03.90 DEMENTIA WITHOUT BEHAVIORAL DISTURBANCE, PSYCHOTIC DISTURBANCE, MOOD DISTURBANCE, OR ANXIETY, UNSPECIFIED DEMENTIA SEVERITY, UNSPECIFIED DEMENTIA TYPE (HCC): ICD-10-CM

## 2024-01-16 DIAGNOSIS — L97.919 CHRONIC VENOUS HYPERTENSION (IDIOPATHIC) WITH ULCER OF RIGHT LOWER EXTREMITY (HCC): ICD-10-CM

## 2024-01-16 DIAGNOSIS — R21 RASH: Primary | ICD-10-CM

## 2024-01-16 DIAGNOSIS — R21 RASH: ICD-10-CM

## 2024-01-16 DIAGNOSIS — I87.311 CHRONIC VENOUS HYPERTENSION (IDIOPATHIC) WITH ULCER OF RIGHT LOWER EXTREMITY (HCC): ICD-10-CM

## 2024-01-16 LAB
ALBUMIN SERPL BCP-MCNC: 4.6 G/DL (ref 3.5–5)
ALP SERPL-CCNC: 89 U/L (ref 34–104)
ALT SERPL W P-5'-P-CCNC: 23 U/L (ref 7–52)
ANA SER QL IA: NEGATIVE
ANION GAP SERPL CALCULATED.3IONS-SCNC: 7 MMOL/L
AST SERPL W P-5'-P-CCNC: 40 U/L (ref 13–39)
BASOPHILS # BLD AUTO: 0.05 THOUSANDS/ÂΜL (ref 0–0.1)
BASOPHILS NFR BLD AUTO: 1 % (ref 0–1)
BILIRUB SERPL-MCNC: 0.68 MG/DL (ref 0.2–1)
BUN SERPL-MCNC: 35 MG/DL (ref 5–25)
C3 SERPL-MCNC: 110 MG/DL (ref 87–200)
C4 SERPL-MCNC: 36 MG/DL (ref 19–52)
CALCIUM SERPL-MCNC: 10.3 MG/DL (ref 8.4–10.2)
CHLORIDE SERPL-SCNC: 106 MMOL/L (ref 96–108)
CO2 SERPL-SCNC: 26 MMOL/L (ref 21–32)
CREAT SERPL-MCNC: 1.22 MG/DL (ref 0.6–1.3)
CRP SERPL QL: 2 MG/L
EOSINOPHIL # BLD AUTO: 0.07 THOUSAND/ÂΜL (ref 0–0.61)
EOSINOPHIL NFR BLD AUTO: 1 % (ref 0–6)
ERYTHROCYTE [DISTWIDTH] IN BLOOD BY AUTOMATED COUNT: 14.7 % (ref 11.6–15.1)
ERYTHROCYTE [SEDIMENTATION RATE] IN BLOOD: 7 MM/HOUR (ref 0–29)
GFR SERPL CREATININE-BSD FRML MDRD: 40 ML/MIN/1.73SQ M
GLUCOSE SERPL-MCNC: 100 MG/DL (ref 65–140)
HCT VFR BLD AUTO: 36.9 % (ref 34.8–46.1)
HGB BLD-MCNC: 12.5 G/DL (ref 11.5–15.4)
IMM GRANULOCYTES # BLD AUTO: 0.01 THOUSAND/UL (ref 0–0.2)
IMM GRANULOCYTES NFR BLD AUTO: 0 % (ref 0–2)
LYMPHOCYTES # BLD AUTO: 1.02 THOUSANDS/ÂΜL (ref 0.6–4.47)
LYMPHOCYTES NFR BLD AUTO: 16 % (ref 14–44)
MCH RBC QN AUTO: 34.1 PG (ref 26.8–34.3)
MCHC RBC AUTO-ENTMCNC: 33.9 G/DL (ref 31.4–37.4)
MCV RBC AUTO: 101 FL (ref 82–98)
MONOCYTES # BLD AUTO: 0.58 THOUSAND/ÂΜL (ref 0.17–1.22)
MONOCYTES NFR BLD AUTO: 9 % (ref 4–12)
NEUTROPHILS # BLD AUTO: 4.58 THOUSANDS/ÂΜL (ref 1.85–7.62)
NEUTS SEG NFR BLD AUTO: 73 % (ref 43–75)
NRBC BLD AUTO-RTO: 0 /100 WBCS
PLATELET # BLD AUTO: 179 THOUSANDS/UL (ref 149–390)
PMV BLD AUTO: 10.1 FL (ref 8.9–12.7)
POTASSIUM SERPL-SCNC: 4.1 MMOL/L (ref 3.5–5.3)
PROT SERPL-MCNC: 7.4 G/DL (ref 6.4–8.4)
RBC # BLD AUTO: 3.67 MILLION/UL (ref 3.81–5.12)
SODIUM SERPL-SCNC: 139 MMOL/L (ref 135–147)
WBC # BLD AUTO: 6.31 THOUSAND/UL (ref 4.31–10.16)

## 2024-01-16 PROCEDURE — 86160 COMPLEMENT ANTIGEN: CPT

## 2024-01-16 PROCEDURE — 83520 IMMUNOASSAY QUANT NOS NONAB: CPT

## 2024-01-16 PROCEDURE — 86037 ANCA TITER EACH ANTIBODY: CPT

## 2024-01-16 PROCEDURE — 80053 COMPREHEN METABOLIC PANEL: CPT

## 2024-01-16 PROCEDURE — 99214 OFFICE O/P EST MOD 30 MIN: CPT | Performed by: FAMILY MEDICINE

## 2024-01-16 PROCEDURE — 82595 ASSAY OF CRYOGLOBULIN: CPT

## 2024-01-16 PROCEDURE — 36415 COLL VENOUS BLD VENIPUNCTURE: CPT

## 2024-01-16 PROCEDURE — 85652 RBC SED RATE AUTOMATED: CPT

## 2024-01-16 PROCEDURE — 86140 C-REACTIVE PROTEIN: CPT

## 2024-01-16 PROCEDURE — 86038 ANTINUCLEAR ANTIBODIES: CPT

## 2024-01-16 PROCEDURE — 85025 COMPLETE CBC W/AUTO DIFF WBC: CPT

## 2024-01-22 LAB
C-ANCA TITR SER IF: NORMAL TITER
MYELOPEROXIDASE AB SER IA-ACNC: <0.2 UNITS (ref 0–0.9)
P-ANCA ATYPICAL TITR SER IF: NORMAL TITER
P-ANCA TITR SER IF: NORMAL TITER
PROTEINASE3 AB SER IA-ACNC: <0.2 UNITS (ref 0–0.9)

## 2024-01-23 LAB — CRYOGLOB SER QL 1D COLD INC: POSITIVE

## 2024-01-27 ENCOUNTER — OFFICE VISIT (OUTPATIENT)
Dept: URGENT CARE | Facility: CLINIC | Age: 84
End: 2024-01-27
Payer: MEDICARE

## 2024-01-27 VITALS
OXYGEN SATURATION: 99 % | HEART RATE: 70 BPM | TEMPERATURE: 97.7 F | SYSTOLIC BLOOD PRESSURE: 98 MMHG | RESPIRATION RATE: 18 BRPM | DIASTOLIC BLOOD PRESSURE: 60 MMHG

## 2024-01-27 DIAGNOSIS — R22.0 FACIAL SWELLING: Primary | ICD-10-CM

## 2024-01-27 PROCEDURE — 99213 OFFICE O/P EST LOW 20 MIN: CPT | Performed by: PHYSICIAN ASSISTANT

## 2024-01-27 PROCEDURE — G0463 HOSPITAL OUTPT CLINIC VISIT: HCPCS | Performed by: PHYSICIAN ASSISTANT

## 2024-01-27 RX ORDER — AMOXICILLIN AND CLAVULANATE POTASSIUM 875; 125 MG/1; MG/1
1 TABLET, FILM COATED ORAL EVERY 12 HOURS SCHEDULED
Qty: 14 TABLET | Refills: 0 | Status: SHIPPED | OUTPATIENT
Start: 2024-01-27 | End: 2024-02-03

## 2024-01-27 NOTE — PATIENT INSTRUCTIONS
Take the antibiotic twice a day for 1 week.  Follow-up with her PCP for evaluation of the purple nodule found today.

## 2024-01-27 NOTE — PROGRESS NOTES
[facility]        NAME: Suri Patel is a 83 y.o. female  : 1940    MRN: [id number]  DATE: 2024  TIME: 3:12 PM    Assessment and Plan   Facial swelling [R22.0]  1. Facial swelling  amoxicillin-clavulanate (AUGMENTIN) 875-125 mg per tablet            Patient Instructions     Patient Instructions   Take the antibiotic twice a day for 1 week.  Follow-up with her PCP for evaluation of the purple nodule found today.      Follow up with PCP in 3-5 days.  Proceed to  ER if symptoms worsen.    Chief Complaint     Chief Complaint   Patient presents with    Facial Swelling     Patient with right sided facial swelling that was noticed last night.          History of Present Illness       The patient is a 83-year-old female with a past medical history of chronic venous hypertension and dementia presenting today for swelling of her right lower jaw.  Her caregiver reports a history of poor dentition.  Reports that she has seen a dentist recently who did not recommend getting her teeth out.  No fevers.  No other symptoms.        Review of Systems   Review of Systems   Constitutional:  Negative for activity change, appetite change, chills, fatigue and fever.   HENT:  Positive for facial swelling. Negative for congestion, ear pain, rhinorrhea, sinus pressure, sinus pain and sore throat.    Eyes:  Negative for pain and visual disturbance.   Respiratory:  Negative for cough, chest tightness and shortness of breath.    Cardiovascular:  Negative for chest pain and palpitations.   Gastrointestinal:  Negative for abdominal pain, diarrhea, nausea and vomiting.   Genitourinary:  Negative for dysuria and hematuria.   Musculoskeletal:  Negative for arthralgias, back pain and myalgias.   Skin:  Negative for color change, pallor and rash.   Neurological:  Negative for seizures, syncope and headaches.   All other systems reviewed and are negative.        Current Medications       Current Outpatient Medications:      amoxicillin-clavulanate (AUGMENTIN) 875-125 mg per tablet, Take 1 tablet by mouth every 12 (twelve) hours for 7 days, Disp: 14 tablet, Rfl: 0    Incontinence Supplies (Wings Incontinence Pants S/M) MISC, Use 6 (six) times a day, Disp: 200 each, Rfl: 5    clotrimazole-betamethasone (LOTRISONE) 1-0.05 % cream, Apply topically 2 (two) times a day (Patient not taking: Reported on 2024), Disp: 45 g, Rfl: 0    Cod Liver Oil 1000 MG CAPS, Take by mouth (Patient not taking: Reported on 2024), Disp: , Rfl:     NON FORMULARY, SPM (Patient not taking: Reported on 2024), Disp: , Rfl:     NON FORMULARY, TUDCA (Patient not taking: Reported on 2024), Disp: , Rfl:     NON FORMULARY, PQQ (Patient not taking: Reported on 2024), Disp: , Rfl:     Probiotic Product (PROBIOTIC-10 PO), Take by mouth (Patient not taking: Reported on 2024), Disp: , Rfl:     Current Allergies     Allergies as of 2024 - Reviewed 2024   Allergen Reaction Noted    Prednisone Other (See Comments) 2022            The following portions of the patient's history were reviewed and updated as appropriate: allergies, current medications, past family history, past medical history, past social history, past surgical history and problem list.     Past Medical History:   Diagnosis Date    Cellulitis of right lower extremity 2022    Dementia (HCC)     Memory loss 2018    Mild protein-calorie malnutrition (HCC)  2021       Past Surgical History:   Procedure Laterality Date    ANKLE FRACTURE SURGERY         Family History   Problem Relation Age of Onset    Dementia Sister         Suri's sister is     Cancer Father          Medications have been verified.        Objective   BP 98/60   Pulse 70   Temp 97.7 °F (36.5 °C)   Resp 18   SpO2 99%        Physical Exam     Physical Exam  Constitutional:       General: She is not in acute distress.     Appearance: Normal appearance. She is normal weight. She  is not ill-appearing, toxic-appearing or diaphoretic.   HENT:      Head: Normocephalic and atraumatic.      Nose: Nose normal. No congestion or rhinorrhea.      Mouth/Throat:      Mouth: Mucous membranes are moist.      Pharynx: Oropharynx is clear. No oropharyngeal exudate or posterior oropharyngeal erythema.      Comments: Swelling of R lower jaw   Erythema of the gums   Small purple nodule seen on the mucosa of the right cheek.  Poor dentition   Cardiovascular:      Rate and Rhythm: Normal rate and regular rhythm.      Heart sounds: Normal heart sounds. No murmur heard.     No friction rub. No gallop.   Pulmonary:      Effort: Pulmonary effort is normal. No respiratory distress.      Breath sounds: Normal breath sounds. No stridor. No wheezing, rhonchi or rales.   Chest:      Chest wall: No tenderness.   Abdominal:      General: Abdomen is flat. Bowel sounds are normal.      Palpations: Abdomen is soft.   Musculoskeletal:         General: Normal range of motion.   Skin:     General: Skin is warm and dry.      Capillary Refill: Capillary refill takes less than 2 seconds.   Neurological:      Mental Status: She is alert.

## 2024-01-31 ENCOUNTER — PATIENT MESSAGE (OUTPATIENT)
Dept: FAMILY MEDICINE CLINIC | Facility: CLINIC | Age: 84
End: 2024-01-31

## 2024-01-31 DIAGNOSIS — R21 RASH: Primary | ICD-10-CM

## 2024-02-05 ENCOUNTER — APPOINTMENT (OUTPATIENT)
Dept: LAB | Facility: CLINIC | Age: 84
End: 2024-02-05
Payer: MEDICARE

## 2024-02-05 DIAGNOSIS — R79.89 ABNORMAL LFTS: ICD-10-CM

## 2024-02-05 DIAGNOSIS — Z86.19 HISTORY OF HEPATITIS: ICD-10-CM

## 2024-02-05 DIAGNOSIS — R74.01 ELEVATION OF LEVELS OF LIVER TRANSAMINASE LEVELS: ICD-10-CM

## 2024-02-05 DIAGNOSIS — Z86.19 HISTORY OF HEPATITIS: Primary | ICD-10-CM

## 2024-02-05 PROCEDURE — 36415 COLL VENOUS BLD VENIPUNCTURE: CPT

## 2024-02-05 PROCEDURE — 80074 ACUTE HEPATITIS PANEL: CPT

## 2024-02-06 DIAGNOSIS — Z86.19 HISTORY OF HEPATITIS: Primary | ICD-10-CM

## 2024-02-06 LAB
HAV IGM SER QL: ABNORMAL
HBV CORE IGM SER QL: ABNORMAL
HBV SURFACE AG SER QL: ABNORMAL
HCV AB SER QL: ABNORMAL

## 2024-02-06 RX ORDER — TRIAMCINOLONE ACETONIDE 1 MG/G
CREAM TOPICAL 2 TIMES DAILY
Qty: 45 G | Refills: 1 | Status: SHIPPED | OUTPATIENT
Start: 2024-02-06

## 2024-02-19 ENCOUNTER — APPOINTMENT (OUTPATIENT)
Dept: LAB | Facility: CLINIC | Age: 84
End: 2024-02-19
Payer: MEDICARE

## 2024-02-19 DIAGNOSIS — Z86.19 HISTORY OF HEPATITIS: ICD-10-CM

## 2024-02-19 LAB — HAV AB SER QL IA: REACTIVE

## 2024-02-19 PROCEDURE — 86708 HEPATITIS A ANTIBODY: CPT

## 2024-02-19 PROCEDURE — 36415 COLL VENOUS BLD VENIPUNCTURE: CPT

## 2024-02-20 ENCOUNTER — APPOINTMENT (OUTPATIENT)
Dept: LAB | Facility: CLINIC | Age: 84
End: 2024-02-20
Payer: MEDICARE

## 2024-02-20 DIAGNOSIS — R76.8 HEPATITIS A ANTIBODY POSITIVE: ICD-10-CM

## 2024-02-20 DIAGNOSIS — R76.8 HEPATITIS A ANTIBODY POSITIVE: Primary | ICD-10-CM

## 2024-02-20 LAB — HAV IGM SER QL: ABNORMAL

## 2024-02-20 PROCEDURE — 36415 COLL VENOUS BLD VENIPUNCTURE: CPT

## 2024-02-20 PROCEDURE — 86709 HEPATITIS A IGM ANTIBODY: CPT

## 2024-02-22 DIAGNOSIS — Z86.19 HISTORY OF HEPATITIS: Primary | ICD-10-CM

## 2024-03-06 ENCOUNTER — APPOINTMENT (OUTPATIENT)
Dept: LAB | Facility: CLINIC | Age: 84
End: 2024-03-06
Payer: MEDICARE

## 2024-03-06 DIAGNOSIS — Z86.19 HISTORY OF HEPATITIS: ICD-10-CM

## 2024-03-06 PROCEDURE — 86709 HEPATITIS A IGM ANTIBODY: CPT

## 2024-03-06 PROCEDURE — 36415 COLL VENOUS BLD VENIPUNCTURE: CPT

## 2024-03-07 LAB — HAV IGM SER QL: REACTIVE

## 2024-03-08 DIAGNOSIS — R76.8 HEPATITIS A ANTIBODY POSITIVE: Primary | ICD-10-CM

## 2024-03-14 ENCOUNTER — CONSULT (OUTPATIENT)
Dept: GASTROENTEROLOGY | Facility: CLINIC | Age: 84
End: 2024-03-14
Payer: MEDICARE

## 2024-03-14 VITALS
DIASTOLIC BLOOD PRESSURE: 76 MMHG | HEIGHT: 69 IN | BODY MASS INDEX: 20.14 KG/M2 | RESPIRATION RATE: 18 BRPM | WEIGHT: 136 LBS | SYSTOLIC BLOOD PRESSURE: 122 MMHG | OXYGEN SATURATION: 98 % | TEMPERATURE: 98 F | HEART RATE: 61 BPM

## 2024-03-14 DIAGNOSIS — R76.8 HEPATITIS A ANTIBODY POSITIVE: ICD-10-CM

## 2024-03-14 PROCEDURE — 99203 OFFICE O/P NEW LOW 30 MIN: CPT | Performed by: INTERNAL MEDICINE

## 2024-03-14 NOTE — PROGRESS NOTES
[facility] - Outpatient Note  Suri Patel 83 y.o. female MRN: [id number]  Encounter: [id number]      ASSESSMENT AND PLAN:    Suri Patel is a 83 y.o. old pleasant female with PMH of dementia, venous hypertension with ulcer who presents for consultation for positive hepatitis A IgM.    Positive hepatitis A IgM-likely false positive with prolonged persistence of IgM.  Her liver enzymes are essentially normal.  No clinical symptoms.  Imaging of the liver last year CAT scan was normal.  She did have travel in the past to your [place] as a [place] and contracted hepatitis A at that time.  No further workup at this time  No precautions necessary  Monitor clinically    Constipation-moderate stool burden on CAT scan last year.  Recommend patient increase fluid and fiber.  1. Hepatitis A antibody positive    - Ambulatory referral to Gastroenterology    ______________________________________________________________________    SUBJECTIVE: Patient presents for positive hep A antibody.  Essentially asymptomatic.  Denies abdominal pain nausea vomiting constipation diarrhea.  History was obtained through daughters in room.  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  Otherwise feels well overall.      I reviewed prior external notes    I reviewed previous lab results and images      REVIEW OF SYSTEMS:     REVIEW OF ALL OTHER SYSTEMS IS OTHERWISE NEGATIVE.      Historical Information   Past Medical History:   Diagnosis Date    Cellulitis of right lower extremity 6/21/2022    Dementia (HCC)     Memory loss 2018    Mild protein-calorie malnutrition (HCC)  7/28/2021     Past Surgical History:   Procedure Laterality Date    ANKLE FRACTURE SURGERY  1996     Social History   Social History     Substance and Sexual Activity   Alcohol Use Not Currently     Social History     Substance and Sexual Activity   Drug Use No     Social History     Tobacco Use   Smoking Status  "Never   Smokeless Tobacco Never     Family History   Problem Relation Age of Onset    Dementia Sister         Suri's sister is     Cancer Father        Meds/Allergies       Current Outpatient Medications:     Incontinence Supplies (Wings Incontinence Pants S/M) MISC    triamcinolone (KENALOG) 0.1 % cream    Allergies   Allergen Reactions    Prednisone Other (See Comments)     Insomnia, Delirium/Anger -- was given oral steroid for rash while hospitalized, required 1:1 and restraints            Objective     Blood pressure 122/76, pulse 61, temperature 98 °F (36.7 °C), temperature source Tympanic, resp. rate 18, height 5' 9\" (1.753 m), weight 61.7 kg (136 lb), SpO2 98%. Body mass index is 20.08 kg/m².      PHYSICAL EXAM:      General Appearance:   Alert, cooperative, no distress   HEENT:   Normocephalic, atraumatic, anicteric.     Neck:  Supple, symmetrical, trachea midline   Lungs:   Clear to auscultation bilaterally; no rales, rhonchi or wheezing; respirations unlabored    Heart::   Regular rate and rhythm; no murmur, rub, or gallop.   Abdomen:   Soft, non-tender, non-distended; normal bowel sounds; no masses, no organomegaly    Genitalia:   Deferred    Rectal:   Deferred    Extremities:  No cyanosis, clubbing or edema    Pulses:  2+ and symmetric    Skin:  No jaundice, rashes, or lesions    Lymph nodes:  No palpable cervical lymphadenopathy        Lab Results:   No visits with results within 1 Day(s) from this visit.   Latest known visit with results is:   Appointment on 2024   Component Date Value    Hep A IgM 2024 Reactive (A)          Radiology Results:   No results found.    Answers submitted by the patient for this visit:  Abdominal Pain Questionnaire (Submitted on 3/11/2024)  Chief Complaint: Abdominal pain  Pain - numeric: 0/10  anorexia: No  arthralgias: No  belching: No  constipation: No  diarrhea: No  fever: No  flatus: No  headaches: No  hematochezia: No  hematuria: No  melena: " No  myalgias: No  nausea: No  weight loss: No  vomiting: No

## 2024-06-05 DIAGNOSIS — Z13.1 SCREENING FOR DIABETES MELLITUS: ICD-10-CM

## 2024-06-05 DIAGNOSIS — F03.90 DEMENTIA WITHOUT BEHAVIORAL DISTURBANCE, PSYCHOTIC DISTURBANCE, MOOD DISTURBANCE, OR ANXIETY, UNSPECIFIED DEMENTIA SEVERITY, UNSPECIFIED DEMENTIA TYPE (HCC): Primary | ICD-10-CM

## 2024-06-05 DIAGNOSIS — E55.9 VITAMIN D DEFICIENCY: ICD-10-CM

## 2024-06-09 NOTE — PROGRESS NOTES
"Ambulatory Visit  Name: Suri Patel      : 1940      MRN: [id number]  Encounter Provider: Aline Vincent DO  Encounter Date: 6/10/2024   Encounter department: [facility]    Assessment & Plan   1. Rash  Assessment & Plan:  Persistent, worsening rash on LE (L>R). Discussed exam looks somewhat concerning for multiple etiologies including heat rash, venous stasis changes, possible eczema vs fungal. Discussed taking a break from compression stockings during the day in case there is a component of irritation from heat/dampness. Will trial topical Lotrison, as pt previously responded well to this on her back. F/u with Derm as scheduled for re-evaluation.   Orders:  -     clotrimazole-betamethasone (LOTRISONE) 1-0.05 % cream; Apply topically 2 (two) times a day  2. Dementia with other behavioral disturbance, unspecified dementia severity, unspecified dementia type (HCC)  Assessment & Plan:  Reviewed possible options to help with dementia symptoms including low-dose Lexapro for anxiety, Remeron or Seroquel for sleep. Reviewed mechanisms of action, possible ADRs. Pt's care givers defer at this time, but will send V[employer]cher[employer] message if they would like to pursue anything, more likely Lexapro, as pt is more so experiencing anxiety intermittently throughout the day.   Encouraged regular routine.      History of Present Illness     HPI    Pt presents with her caregivers to re-evaluate chronic rash  Rash on her legs has worsened -- it is larger, spreading and now pruritic   Started topical anti-fungal for a few days and does note a bit of improvement with that     Her caregiver's also note that she has intermittent increases in anxiety, she gets \"rammy\" in the afternoon. Most of the time she sleeps well, though there are some nights she wakes up in the middle of night and can't get back to sleep. She is very active throughout the day. Pt does have known diagnosis of dementia.     Review of Systems " "  Skin:  Positive for rash.       Objective     /76   Pulse 72   Temp 97.6 °F (36.4 °C)   Ht 5' 9\" (1.753 m)   Wt 61.1 kg (134 lb 9.6 oz)   SpO2 97%   BMI 19.88 kg/m²     Physical Exam  Vitals and nursing note reviewed.   Constitutional:       General: She is not in acute distress.     Appearance: Normal appearance.   Pulmonary:      Effort: Pulmonary effort is normal. No respiratory distress.   Musculoskeletal:        Feet:    Skin:         Neurological:      Mental Status: She is alert. Mental status is at baseline.   Psychiatric:         Mood and Affect: Mood normal.       Administrative Statements           "

## 2024-06-10 ENCOUNTER — OFFICE VISIT (OUTPATIENT)
Dept: FAMILY MEDICINE CLINIC | Facility: CLINIC | Age: 84
End: 2024-06-10
Payer: MEDICARE

## 2024-06-10 VITALS
SYSTOLIC BLOOD PRESSURE: 116 MMHG | HEART RATE: 72 BPM | TEMPERATURE: 97.6 F | BODY MASS INDEX: 19.93 KG/M2 | HEIGHT: 69 IN | DIASTOLIC BLOOD PRESSURE: 76 MMHG | OXYGEN SATURATION: 97 % | WEIGHT: 134.6 LBS

## 2024-06-10 DIAGNOSIS — R21 RASH: Primary | ICD-10-CM

## 2024-06-10 DIAGNOSIS — F03.918 DEMENTIA WITH OTHER BEHAVIORAL DISTURBANCE, UNSPECIFIED DEMENTIA SEVERITY, UNSPECIFIED DEMENTIA TYPE (HCC): ICD-10-CM

## 2024-06-10 PROCEDURE — 99214 OFFICE O/P EST MOD 30 MIN: CPT | Performed by: FAMILY MEDICINE

## 2024-06-10 PROCEDURE — G2211 COMPLEX E/M VISIT ADD ON: HCPCS | Performed by: FAMILY MEDICINE

## 2024-06-10 RX ORDER — CLOTRIMAZOLE AND BETAMETHASONE DIPROPIONATE 10; .64 MG/G; MG/G
CREAM TOPICAL 2 TIMES DAILY
Qty: 90 G | Refills: 2 | Status: SHIPPED | OUTPATIENT
Start: 2024-06-10

## 2024-06-10 NOTE — ASSESSMENT & PLAN NOTE
Reviewed possible options to help with dementia symptoms including low-dose Lexapro for anxiety, Remeron or Seroquel for sleep. Reviewed mechanisms of action, possible ADRs. Pt's care givers defer at this time, but will send mychart message if they would like to pursue anything, more likely Lexapro, as pt is more so experiencing anxiety intermittently throughout the day.   Encouraged regular routine.

## 2024-06-10 NOTE — ASSESSMENT & PLAN NOTE
Persistent, worsening rash on LE (L>R). Discussed exam looks somewhat concerning for multiple etiologies including heat rash, venous stasis changes, possible eczema vs fungal. Discussed taking a break from compression stockings during the day in case there is a component of irritation from heat/dampness. Will trial topical Lotrison, as pt previously responded well to this on her back. F/u with Derm as scheduled for re-evaluation.

## 2024-07-11 ENCOUNTER — CONSULT (OUTPATIENT)
Dept: DERMATOLOGY | Facility: CLINIC | Age: 84
End: 2024-07-11

## 2024-07-11 VITALS — HEIGHT: 69 IN | WEIGHT: 134 LBS | BODY MASS INDEX: 19.85 KG/M2 | TEMPERATURE: 98.4 F

## 2024-07-11 DIAGNOSIS — R21 RASH: ICD-10-CM

## 2024-07-11 PROCEDURE — 88313 SPECIAL STAINS GROUP 2: CPT | Performed by: STUDENT IN AN ORGANIZED HEALTH CARE EDUCATION/TRAINING PROGRAM

## 2024-07-11 PROCEDURE — 88305 TISSUE EXAM BY PATHOLOGIST: CPT | Performed by: STUDENT IN AN ORGANIZED HEALTH CARE EDUCATION/TRAINING PROGRAM

## 2024-07-11 RX ORDER — CLOTRIMAZOLE AND BETAMETHASONE DIPROPIONATE 10; .64 MG/G; MG/G
CREAM TOPICAL 2 TIMES DAILY
Qty: 45 G | Refills: 0 | Status: SHIPPED | OUTPATIENT
Start: 2024-07-11

## 2024-07-11 NOTE — PATIENT INSTRUCTIONS
"INFORMED CONSENT DISCUSSION AND POST-OPERATIVE INSTRUCTIONS FOR PATIENT    I.  RATIONALE FOR PROCEDURE  I understand that a skin biopsy allows the Dermatologist to test a lesion or rash under the microscope to obtain a diagnosis.  It usually involves numbing the area with numbing medication and removing a small piece of skin; sometimes the area will be closed with sutures. In this specific procedure, sutures are not usually needed.  If any sutures are placed, then they are usually need to be removed in 2 weeks or less.    I understand that my Dermatologist recommends that a skin \"shave\" biopsy be performed today.  A local anesthetic, similar to the kind that a dentist uses when filling a cavity, will be injected with a very small needle into the skin area to be sampled.  The injected skin and tissue underneath \"will go to sleep” and become numb so no pain should be felt afterwards.  An instrument shaped like a tiny \"razor blade\" (shave biopsy instrument) will be used to cut a small piece of tissue and skin from the area so that a sample of tissue can be taken and examined more closely under the microscope.  A slight amount of bleeding will occur, but it will be stopped with direct pressure and a pressure bandage and any other appropriate methods.  I understands that a scar will form where the wound was created.  Surgical ointment will be applied to help protect the wound.  Sutures are not usually needed.    II.  RISKS AND POTENTIAL COMPLICATIONS   I understand the risks and potential complications of a skin biopsy include but are not limited to the following:  Bleeding  Infection  Pain  Scar/keloid  Skin discoloration  Incomplete Removal  Recurrence  Nerve Damage/Numbness/Loss of Function  Allergic Reaction to Anesthesia  Biopsies are diagnostic procedures and based on findings additional treatment or evaluation may be required  Loss or destruction of specimen resulting in no additional findings    My Dermatologist " "has explained to me the nature of the condition, the nature of the procedure, and the benefits to be reasonably expected compared with alternative approaches.  My Dermatologist has discussed the likelihood of major risks or complications of this procedure including the specific risks listed above, such as bleeding, infection, and scarring/keloid.  I understand that a scar is expected after this procedure.  I understand that my physician cannot predict if the scar will form a \"keloid,\" which extends beyond the borders of the wound that is created.  A keloid is a thick, painful, and bumpy scar.  A keloid can be difficult to treat, as it does not always respond well to therapy, which includes injecting cortisone directly into the keloid every few weeks.  While this usually reduces the pain and size of the scar, it does not eliminate it.      I understand that photographs may be taken before and after the procedure.  These will be maintained as part of the medical providers confidential records and may not be made available to me.  I further authorize the medical provider to use the photographs for teaching purposes or to illustrate scientific papers, books, or lectures if in his/her judgment, medical research, education, or science may benefit from its use.    I have had an opportunity to fully inquire about the risks and benefits of this procedure and its alternatives.   I have been given ample time and opportunity to ask questions and to seek a second opinion if I wished to do so.  I acknowledge that there have specifically been no guarantees as to the cosmetic results from the procedure.  I am aware that with any procedure there is always the possibility of an unexpected complication.    III. POST-PROCEDURAL CARE (WHAT YOU WILL NEED TO DO \"AFTER THE BIOPSY\" TO OPTIMIZE HEALING)    Keep the area clean and dry.  Try NOT to remove the bandage or get it wet for the first 24 hours.    Gently clean the area and apply " "surgical ointment (such as Vaseline petrolatum ointment, which is available \"over the counter\" and not a prescription) to the biopsy site for up to 2 weeks straight.  This acts to protect the wound from the outside world.      Sutures are not usually placed in this procedure.  If any sutures were placed, return for suture removal as instructed (generally 1 week for the face, 2 weeks for the body).      Take Acetaminophen (Tylenol) for discomfort, if no contraindications.  Ibuprofen or aspirin could make bleeding worse.    Call our office immediately for signs of infection: fever, chills, increased redness, warmth, tenderness, discomfort/pain, or pus or foul smell coming from the wound.    WHAT TO DO IF THERE IS ANY BLEEDING?  If a small amount of bleeding is noticed, place a clean cloth over the area and apply firm pressure for ten minutes.  Check the wound after 10 minutes of direct pressure.  If bleeding persists, try one more time for an additional 10 minutes of direct pressure on the area.  If the bleeding becomes heavier or does not stop after the second attempt, or if you have any other questions about this procedure, then please call your [facility]'s Dermatologist by calling [phone] (SKIN).     Assessment and Plan:  Based on a thorough discussion of this condition and the management approach to it (including a comprehensive discussion of the known risks, side effects and potential benefits of treatment), the patient (family) agrees to implement the following specific plan:  Shave biopsy done in office today   Can continue Lotrisone cream twice a day as needed.  "

## 2024-07-11 NOTE — PROGRESS NOTES
"[facility] Dermatology Clinic Note     Patient Name: Suri Patel  Encounter Date: 07/11/2024    Have you been cared for by a [facility] Dermatologist in the last 3 years and, if so, which description applies to you?    NO.   I am considered a \"new\" patient and must complete all patient intake questions. I am FEMALE/of child-bearing potential.    REVIEW OF SYSTEMS:  Have you recently had or currently have any of the following? Recent fever or chills? No  Any non-healing wound? No  Are you pregnant or planning to become pregnant? No  Are you currently or planning to be nursing or breast feeding? No   PAST MEDICAL HISTORY:  Have you personally ever had or currently have any of the following?  If \"YES,\" then please provide more detail. Skin cancer (such as Melanoma, Basal Cell Carcinoma, Squamous Cell Carcinoma?  No  Tuberculosis, HIV/AIDS, Hepatitis B or C: No  Radiation Treatment No   HISTORY OF IMMUNOSUPPRESSION:   Do you have a history of any of the following:  Systemic Immunosuppression such as Diabetes, Biologic or Immunotherapy, Chemotherapy, Organ Transplantation, Bone Marrow Transplantation?  No    Answering \"YES\" requires the addition of the dotphrase \"IMMUNOSUPPRESSED\" as the first diagnosis of the patient's visit.   FAMILY HISTORY:  Any \"first degree relatives\" (parent, brother, sister, or child) with the following?    Skin Cancer, Pancreatic or Other Cancer? No   PATIENT EXPERIENCE:    Do you want the Dermatologist to perform a COMPLETE skin exam today including a clinical examination under the \"bra and underwear\" areas?  NO  If necessary, do we have your permission to call and leave a detailed message on your Preferred Phone number that includes your specific medical information?  Yes      Allergies   Allergen Reactions    Dye [Iodinated Contrast Media] Tachycardia    Prednisone Other (See Comments)     Insomnia, Delirium/Anger -- was given oral steroid for rash while hospitalized, required 1:1 and " restraints       Current Outpatient Medications:     clotrimazole-betamethasone (LOTRISONE) 1-0.05 % cream, Apply topically 2 (two) times a day, Disp: 90 g, Rfl: 2    Incontinence Supplies (Wings Incontinence Pants S/M) MISC, Use 6 (six) times a day, Disp: 200 each, Rfl: 5          Whom besides the patient is providing clinical information about today's encounter?   NO ADDITIONAL HISTORIAN (patient alone provided history)    Physical Exam and Assessment/Plan by Diagnosis:    CHIEF COMPLAINT    84 year old female patient presents today for New Patient.  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     Assessment and Plan:  Based on a thorough discussion of this condition and the management approach to it (including a comprehensive discussion of the known risks, side effects and potential benefits of treatment), the patient (family) agrees to implement the following specific plan:  Shave biopsy done in office today   Can continue Lotrisone cream twice a day as needed.  PROCEDURE TANGENTIAL (SHAVE) BIOPSY NOTE:    Performing Physician:    Anatomic Location; Clinical Description with size (cm); Pre-Op Diagnosis:   Left lower leg; Bilateral lower legs; with crusted pink purple nodules, some clustered; diffdx; lipo dermatosclerosis versus SCC   Post-op diagnosis: Same     Local anesthesia: 1% xylocaine with epi      Topical anesthesia: None    Hemostasis: Aluminum chloride       After obtaining informed consent  at which time there was a  "discussion about the purpose of biopsy  and low risks of infection and bleeding.  The area was prepped and draped in the usual fashion. Anesthesia was obtained with 1% lidocaine with epinephrine. A shave biopsy to an appropriate sampling depth was obtained by Shave (Dermablade or 15 blade) The resulting wound was covered with surgical ointment and bandaged appropriately.     The patient tolerated the procedure well without complications and was without signs of functional compromise.      Specimen has been sent for review by Dermatopathology.    Standard post-procedure care has been explained and has been included in written form within the patient's copy of Informed Consent.    INFORMED CONSENT DISCUSSION AND POST-OPERATIVE INSTRUCTIONS FOR PATIENT    I.  RATIONALE FOR PROCEDURE  I understand that a skin biopsy allows the Dermatologist to test a lesion or rash under the microscope to obtain a diagnosis.  It usually involves numbing the area with numbing medication and removing a small piece of skin; sometimes the area will be closed with sutures. In this specific procedure, sutures are not usually needed.  If any sutures are placed, then they are usually need to be removed in 2 weeks or less.    I understand that my Dermatologist recommends that a skin \"shave\" biopsy be performed today.  A local anesthetic, similar to the kind that a dentist uses when filling a cavity, will be injected with a very small needle into the skin area to be sampled.  The injected skin and tissue underneath \"will go to sleep” and become numb so no pain should be felt afterwards.  An instrument shaped like a tiny \"razor blade\" (shave biopsy instrument) will be used to cut a small piece of tissue and skin from the area so that a sample of tissue can be taken and examined more closely under the microscope.  A slight amount of bleeding will occur, but it will be stopped with direct pressure and a pressure bandage and any other appropriate " "methods.  I understands that a scar will form where the wound was created.  Surgical ointment will be applied to help protect the wound.  Sutures are not usually needed.    II.  RISKS AND POTENTIAL COMPLICATIONS   I understand the risks and potential complications of a skin biopsy include but are not limited to the following:  Bleeding  Infection  Pain  Scar/keloid  Skin discoloration  Incomplete Removal  Recurrence  Nerve Damage/Numbness/Loss of Function  Allergic Reaction to Anesthesia  Biopsies are diagnostic procedures and based on findings additional treatment or evaluation may be required  Loss or destruction of specimen resulting in no additional findings    My Dermatologist has explained to me the nature of the condition, the nature of the procedure, and the benefits to be reasonably expected compared with alternative approaches.  My Dermatologist has discussed the likelihood of major risks or complications of this procedure including the specific risks listed above, such as bleeding, infection, and scarring/keloid.  I understand that a scar is expected after this procedure.  I understand that my physician cannot predict if the scar will form a \"keloid,\" which extends beyond the borders of the wound that is created.  A keloid is a thick, painful, and bumpy scar.  A keloid can be difficult to treat, as it does not always respond well to therapy, which includes injecting cortisone directly into the keloid every few weeks.  While this usually reduces the pain and size of the scar, it does not eliminate it.      I understand that photographs may be taken before and after the procedure.  These will be maintained as part of the medical providers confidential records and may not be made available to me.  I further authorize the medical provider to use the photographs for teaching purposes or to illustrate scientific papers, books, or lectures if in his/her judgment, medical research, education, or science may " "benefit from its use.    I have had an opportunity to fully inquire about the risks and benefits of this procedure and its alternatives.   I have been given ample time and opportunity to ask questions and to seek a second opinion if I wished to do so.  I acknowledge that there have specifically been no guarantees as to the cosmetic results from the procedure.  I am aware that with any procedure there is always the possibility of an unexpected complication.    III. POST-PROCEDURAL CARE (WHAT YOU WILL NEED TO DO \"AFTER THE BIOPSY\" TO OPTIMIZE HEALING)    Keep the area clean and dry.  Try NOT to remove the bandage or get it wet for the first 24 hours.    Gently clean the area and apply surgical ointment (such as Vaseline petrolatum ointment, which is available \"over the counter\" and not a prescription) to the biopsy site for up to 2 weeks straight.  This acts to protect the wound from the outside world.      Sutures are not usually placed in this procedure.  If any sutures were placed, return for suture removal as instructed (generally 1 week for the face, 2 weeks for the body).      Take Acetaminophen (Tylenol) for discomfort, if no contraindications.  Ibuprofen or aspirin could make bleeding worse.    Call our office immediately for signs of infection: fever, chills, increased redness, warmth, tenderness, discomfort/pain, or pus or foul smell coming from the wound.    WHAT TO DO IF THERE IS ANY BLEEDING?  If a small amount of bleeding is noticed, place a clean cloth over the area and apply firm pressure for ten minutes.  Check the wound after 10 minutes of direct pressure.  If bleeding persists, try one more time for an additional 10 minutes of direct pressure on the area.  If the bleeding becomes heavier or does not stop after the second attempt, or if you have any other questions about this procedure, then please call your . [place]'s Dermatologist by calling [phone] (SKIN).     I hereby acknowledge that I have " reviewed and verified the site with my Dermatologist and have requested and authorized my Dermatologist to proceed with the procedure.            Scribe Attestation      I,:  Virginie Oh MA am acting as a scribe while in the presence of the attending physician.:       I,:  Celi Benedict MD personally performed the services described in this documentation    as scribed in my presence.:

## 2024-07-15 PROCEDURE — 88313 SPECIAL STAINS GROUP 2: CPT | Performed by: STUDENT IN AN ORGANIZED HEALTH CARE EDUCATION/TRAINING PROGRAM

## 2024-07-15 PROCEDURE — 88305 TISSUE EXAM BY PATHOLOGIST: CPT | Performed by: STUDENT IN AN ORGANIZED HEALTH CARE EDUCATION/TRAINING PROGRAM

## 2024-07-17 DIAGNOSIS — L43.0 HYPERTROPHIC LICHEN PLANUS: Primary | ICD-10-CM

## 2024-07-17 RX ORDER — CLOBETASOL PROPIONATE 0.5 MG/G
OINTMENT TOPICAL 2 TIMES DAILY
Qty: 60 G | Refills: 2 | Status: SHIPPED | OUTPATIENT
Start: 2024-07-17

## 2024-07-17 NOTE — RESULT ENCOUNTER NOTE
DERMATOPATHOLOGY RESULT NOTE    Results reviewed by ordering physician.  Reviewed. Sent [employer] message.      Instructions for Clinical Derm Team:   (remember to route Result Note to appropriate staff):    None    Result & Plan by Specimen:    Specimen A: benign  Plan: hepatitis C negative 2/2024, clobetasol

## 2024-07-30 DIAGNOSIS — R21 RASH: ICD-10-CM

## 2024-07-30 RX ORDER — CLOTRIMAZOLE AND BETAMETHASONE DIPROPIONATE 10; .64 MG/G; MG/G
CREAM TOPICAL 2 TIMES DAILY
Qty: 45 G | Refills: 0 | Status: SHIPPED | OUTPATIENT
Start: 2024-07-30

## 2024-08-05 ENCOUNTER — APPOINTMENT (OUTPATIENT)
Dept: LAB | Facility: CLINIC | Age: 84
End: 2024-08-05
Payer: MEDICARE

## 2024-08-05 DIAGNOSIS — F03.90 DEMENTIA WITHOUT BEHAVIORAL DISTURBANCE, PSYCHOTIC DISTURBANCE, MOOD DISTURBANCE, OR ANXIETY, UNSPECIFIED DEMENTIA SEVERITY, UNSPECIFIED DEMENTIA TYPE (HCC): ICD-10-CM

## 2024-08-05 DIAGNOSIS — E55.9 VITAMIN D DEFICIENCY: ICD-10-CM

## 2024-08-05 DIAGNOSIS — Z13.1 SCREENING FOR DIABETES MELLITUS: ICD-10-CM

## 2024-08-05 LAB
25(OH)D3 SERPL-MCNC: 56 NG/ML (ref 30–100)
ALBUMIN SERPL BCG-MCNC: 4.1 G/DL (ref 3.5–5)
ALP SERPL-CCNC: 78 U/L (ref 34–104)
ALT SERPL W P-5'-P-CCNC: 13 U/L (ref 7–52)
ANION GAP SERPL CALCULATED.3IONS-SCNC: 8 MMOL/L (ref 4–13)
AST SERPL W P-5'-P-CCNC: 23 U/L (ref 13–39)
BASOPHILS # BLD AUTO: 0.06 THOUSANDS/ÂΜL (ref 0–0.1)
BASOPHILS NFR BLD AUTO: 1 % (ref 0–1)
BILIRUB SERPL-MCNC: 1.13 MG/DL (ref 0.2–1)
BUN SERPL-MCNC: 28 MG/DL (ref 5–25)
CALCIUM SERPL-MCNC: 9.8 MG/DL (ref 8.4–10.2)
CHLORIDE SERPL-SCNC: 107 MMOL/L (ref 96–108)
CO2 SERPL-SCNC: 26 MMOL/L (ref 21–32)
CREAT SERPL-MCNC: 0.81 MG/DL (ref 0.6–1.3)
EOSINOPHIL # BLD AUTO: 0.11 THOUSAND/ÂΜL (ref 0–0.61)
EOSINOPHIL NFR BLD AUTO: 2 % (ref 0–6)
ERYTHROCYTE [DISTWIDTH] IN BLOOD BY AUTOMATED COUNT: 14.6 % (ref 11.6–15.1)
GFR SERPL CREATININE-BSD FRML MDRD: 65 ML/MIN/1.73SQ M
GLUCOSE P FAST SERPL-MCNC: 76 MG/DL (ref 65–99)
HCT VFR BLD AUTO: 38 % (ref 34.8–46.1)
HGB BLD-MCNC: 12.5 G/DL (ref 11.5–15.4)
IMM GRANULOCYTES # BLD AUTO: 0.01 THOUSAND/UL (ref 0–0.2)
IMM GRANULOCYTES NFR BLD AUTO: 0 % (ref 0–2)
LYMPHOCYTES # BLD AUTO: 0.96 THOUSANDS/ÂΜL (ref 0.6–4.47)
LYMPHOCYTES NFR BLD AUTO: 21 % (ref 14–44)
MCH RBC QN AUTO: 34.4 PG (ref 26.8–34.3)
MCHC RBC AUTO-ENTMCNC: 32.9 G/DL (ref 31.4–37.4)
MCV RBC AUTO: 105 FL (ref 82–98)
MONOCYTES # BLD AUTO: 0.46 THOUSAND/ÂΜL (ref 0.17–1.22)
MONOCYTES NFR BLD AUTO: 10 % (ref 4–12)
NEUTROPHILS # BLD AUTO: 3.05 THOUSANDS/ÂΜL (ref 1.85–7.62)
NEUTS SEG NFR BLD AUTO: 66 % (ref 43–75)
NRBC BLD AUTO-RTO: 0 /100 WBCS
PLATELET # BLD AUTO: 176 THOUSANDS/UL (ref 149–390)
PMV BLD AUTO: 11.1 FL (ref 8.9–12.7)
POTASSIUM SERPL-SCNC: 3.6 MMOL/L (ref 3.5–5.3)
PROT SERPL-MCNC: 7 G/DL (ref 6.4–8.4)
RBC # BLD AUTO: 3.63 MILLION/UL (ref 3.81–5.12)
SODIUM SERPL-SCNC: 141 MMOL/L (ref 135–147)
T4 FREE SERPL-MCNC: 0.88 NG/DL (ref 0.61–1.12)
TSH SERPL DL<=0.05 MIU/L-ACNC: 4.59 UIU/ML (ref 0.45–4.5)
VIT B12 SERPL-MCNC: 691 PG/ML (ref 180–914)
WBC # BLD AUTO: 4.65 THOUSAND/UL (ref 4.31–10.16)

## 2024-08-05 PROCEDURE — 84443 ASSAY THYROID STIM HORMONE: CPT

## 2024-08-05 PROCEDURE — 80053 COMPREHEN METABOLIC PANEL: CPT

## 2024-08-05 PROCEDURE — 84439 ASSAY OF FREE THYROXINE: CPT

## 2024-08-05 PROCEDURE — 82306 VITAMIN D 25 HYDROXY: CPT

## 2024-08-05 PROCEDURE — 36415 COLL VENOUS BLD VENIPUNCTURE: CPT

## 2024-08-05 PROCEDURE — 85025 COMPLETE CBC W/AUTO DIFF WBC: CPT

## 2024-08-05 PROCEDURE — 82607 VITAMIN B-12: CPT

## 2024-08-12 ENCOUNTER — OFFICE VISIT (OUTPATIENT)
Dept: FAMILY MEDICINE CLINIC | Facility: CLINIC | Age: 84
End: 2024-08-12
Payer: MEDICARE

## 2024-08-12 VITALS
BODY MASS INDEX: 19.36 KG/M2 | HEART RATE: 62 BPM | DIASTOLIC BLOOD PRESSURE: 72 MMHG | OXYGEN SATURATION: 97 % | HEIGHT: 69 IN | TEMPERATURE: 97.6 F | WEIGHT: 130.7 LBS | SYSTOLIC BLOOD PRESSURE: 112 MMHG

## 2024-08-12 DIAGNOSIS — Z00.00 MEDICARE ANNUAL WELLNESS VISIT, SUBSEQUENT: Primary | ICD-10-CM

## 2024-08-12 DIAGNOSIS — F03.918 DEMENTIA WITH OTHER BEHAVIORAL DISTURBANCE, UNSPECIFIED DEMENTIA SEVERITY, UNSPECIFIED DEMENTIA TYPE (HCC): ICD-10-CM

## 2024-08-12 DIAGNOSIS — R94.6 ABNORMAL RESULTS OF THYROID FUNCTION STUDIES: ICD-10-CM

## 2024-08-12 PROCEDURE — G0439 PPPS, SUBSEQ VISIT: HCPCS | Performed by: FAMILY MEDICINE

## 2024-08-12 RX ORDER — ALPRAZOLAM 0.25 MG
0.25 TABLET ORAL DAILY PRN
Qty: 10 TABLET | Refills: 0 | Status: SHIPPED | OUTPATIENT
Start: 2024-08-12

## 2024-08-12 NOTE — PROGRESS NOTES
Ambulatory Visit  Name: Suri Patel      : 1940      MRN: [id number]  Encounter Provider: Aline Vincent DO  Encounter Date: 2024   Encounter department: [facility]    Assessment & Plan   1. Medicare annual wellness visit, subsequent  2. Dementia with other behavioral disturbance, unspecified dementia severity, unspecified dementia type (HCC)  Assessment & Plan:  Pt's caregiver's are not interested in daily medication for pt, but would like to try something PRN (feel they would need it once weekly). Discussed trial of low-dose Xanax -- reviewed possible ADRs including worsening confusion, fatigue, dizziness. Encouraged pt to be monitored after taking first time to monitor for side effects. Otherwise, they feel they are managing well with changes in their approach.   Orders:  -     ALPRAZolam (XANAX) 0.25 mg tablet; Take 1 tablet (0.25 mg total) by mouth daily as needed for anxiety  3. Abnormal results of thyroid function studies  Assessment & Plan:  Will repeat in 4 weeks -- if persistently out of range, could consider low dose levothyroxine   Orders:  -     TSH, 3rd generation with Free T4 reflex; Future       Preventive health issues were discussed with patient, and age appropriate screening tests were ordered as noted in patient's After Visit Summary. Personalized health advice and appropriate referrals for health education or preventive services given if needed, as noted in patient's After Visit Summary.    History of Present Illness     HPI     Pt presents with her nieces for AWV    TSH 4.59 (high)/T4 WNL  Cr 0.81/GFR 65 (improved)  Bili 1.13, otherwise LFTs WNL   Glucose 76  RBC low ()   D 56, B12 691       Patient Care Team:  Aline Vincent DO as PCP - General (Family Medicine)    Review of Systems   Unable to perform ROS: Dementia (With assistance of caregivers)   Constitutional:  Negative for appetite change and unexpected weight change.   HENT:  Negative  "for congestion, ear pain, rhinorrhea and sore throat.    Eyes:  Negative for visual disturbance.   Respiratory:  Positive for shortness of breath (intermittent -- her caregivers feel it is due to anxiety). Negative for cough.    Cardiovascular:  Negative for chest pain, palpitations and leg swelling.   Gastrointestinal:         \"As far as we're aware everything's moving\"   Psychiatric/Behavioral:  Positive for confusion.      Medical History Reviewed by provider this encounter:  Tobacco  Allergies  Meds  Problems  Med Hx  Surg Hx  Fam Hx       Annual Wellness Visit Questionnaire   Suri is here for her Subsequent Wellness visit.     Historian  Patient cannot answer questions due to cognitive impairment, intelluctual disability, or expressive limitations. Information provided by: caregiver.    Health Risk Assessment:   Patient feels that their physical health rating is same. Patient is satisfied with their life. Eyesight was rated as same. Hearing was rated as same. Patient feels that their emotional and mental health rating is same. Patients states they are never, rarely angry. Patient states they are never, rarely unusually tired/fatigued. Pain experienced in the last 7 days has been none. Patient states that she has experienced no weight loss or gain in last 6 months.     Depression Screening:   PHQ-2 Score: 0      Fall Risk Screening:   In the past year, patient has experienced: history of falling in past year    Number of falls: 2 or more  Injured during fall?: Yes    Feels unsteady when standing or walking?: No    Worried about falling?: No      Urinary Incontinence Screening:   Patient has leaked urine accidently in the last six months.     Home Safety:  Patient has trouble with stairs inside or outside of their home. Patient has working smoke alarms and has working carbon monoxide detector. Home safety hazards include: none. Patient limits stair exposure     Nutrition:   Current diet is Regular and " Low Carb.     Medications:   Patient is not currently taking any over-the-counter supplements. Patient is not able to manage medications. Medication managed by caretakers     Activities of Daily Living (ADLs)/Instrumental Activities of Daily Living (IADLs):   Walk and transfer into and out of bed and chair?: Yes  Dress and groom yourself?: No    Bathe or shower yourself?: No    Feed yourself? No  Do your laundry/housekeeping?: No  Manage your money, pay your bills and track your expenses?: No  Make your own meals?: No    Do your own shopping?: No    Durable Medical Equipment Suppliers  N/A    Previous Hospitalizations:   Any hospitalizations or ED visits within the last 12 months?: No      Advance Care Planning:     Durable POA for healthcare: Yes      Cognitive Screening:   Provider or family/friend/caregiver concerned regarding cognition?: No    PREVENTIVE SCREENINGS      Cardiovascular Screening:    General: Screening Current      Diabetes Screening:     General: Screening Current      Colorectal Cancer Screening:     General: Screening Not Indicated      Breast Cancer Screening:     General: Screening Not Indicated      Cervical Cancer Screening:    General: Screening Not Indicated      Osteoporosis Screening:    General: Screening Not Indicated      Abdominal Aortic Aneurysm (AAA) Screening:        General: Screening Not Indicated      Lung Cancer Screening:     General: Screening Not Indicated      Hepatitis C Screening:    General: Screening Current    Screening, Brief Intervention, and Referral to Treatment (SBIRT)    Screening  Typical number of drinks in a day: 0  Typical number of drinks in a week: 0  Interpretation: Low risk drinking behavior.    Single Item Drug Screening:  How often have you used an illegal drug (including marijuana) or a prescription medication for non-medical reasons in the past year? never    Single Item Drug Screen Score: 0  Interpretation: Negative screen for possible drug use  "disorder    Social Determinants of Health     Financial Resource Strain: Low Risk  (8/8/2023)    Overall Financial Resource Strain (CARDIA)    • Difficulty of Paying Living Expenses: Not hard at all   Food Insecurity: No Food Insecurity (8/12/2024)    Hunger Vital Sign    • Worried About Running Out of Food in the Last Year: Never true    • Ran Out of Food in the Last Year: Never true   Transportation Needs: No Transportation Needs (8/12/2024)    PRAPARE - Transportation    • Lack of Transportation (Medical): No    • Lack of Transportation (Non-Medical): No   Housing Stability: Low Risk  (8/12/2024)    Housing Stability Vital Sign    • Unable to Pay for Housing in the Last Year: No    • Number of Times Moved in the Last Year: 1    • Homeless in the Last Year: No   Utilities: Not At Risk (8/12/2024)    [facility] Utilities    • Threatened with loss of utilities: No     No results found.    Objective     /72   Pulse 62   Temp 97.6 °F (36.4 °C)   Ht 5' 9\" (1.753 m)   Wt 59.3 kg (130 lb 11.2 oz)   SpO2 97%   BMI 19.30 kg/m²     Physical Exam  Vitals and nursing note reviewed.   Constitutional:       General: She is not in acute distress.     Appearance: She is well-developed.   HENT:      Head: Normocephalic and atraumatic.      Right Ear: Tympanic membrane, ear canal and external ear normal.      Left Ear: Tympanic membrane, ear canal and external ear normal.      Nose: Nose normal. No rhinorrhea.      Mouth/Throat:      Mouth: Mucous membranes are moist.      Pharynx: No oropharyngeal exudate or posterior oropharyngeal erythema.   Eyes:      Conjunctiva/sclera: Conjunctivae normal.   Neck:      Thyroid: No thyromegaly.   Cardiovascular:      Rate and Rhythm: Normal rate and regular rhythm.   Pulmonary:      Effort: Pulmonary effort is normal. No respiratory distress.      Breath sounds: Normal breath sounds.   Abdominal:      General: Bowel sounds are normal. There is no distension.      Palpations: Abdomen is " soft.      Tenderness: There is no abdominal tenderness.   Musculoskeletal:      Right lower leg: No edema.      Left lower leg: No edema.   Lymphadenopathy:      Cervical: No cervical adenopathy.   Skin:     General: Skin is warm and dry.      Comments: Chronic venous ulcer on R shin and scattered rash on B/L LE, previously evaluated by myself and Derm -- going to try medication breaks if pt not excoriating, but otherwise, can continue PRN use of Lotrisone   Neurological:      Mental Status: She is alert. Mental status is at baseline.      Comments: Not able to respond to questions    Psychiatric:         Mood and Affect: Mood normal.

## 2024-08-12 NOTE — ASSESSMENT & PLAN NOTE
Pt's caregiver's are not interested in daily medication for pt, but would like to try something PRN (feel they would need it once weekly). Discussed trial of low-dose Xanax -- reviewed possible ADRs including worsening confusion, fatigue, dizziness. Encouraged pt to be monitored after taking first time to monitor for side effects. Otherwise, they feel they are managing well with changes in their approach.

## 2024-09-26 DIAGNOSIS — R32 URINARY INCONTINENCE, UNSPECIFIED TYPE: ICD-10-CM

## 2024-10-22 ENCOUNTER — APPOINTMENT (OUTPATIENT)
Dept: LAB | Facility: CLINIC | Age: 84
End: 2024-10-22
Payer: MEDICARE

## 2024-10-31 DIAGNOSIS — R21 RASH: ICD-10-CM

## 2024-10-31 RX ORDER — CLOTRIMAZOLE AND BETAMETHASONE DIPROPIONATE 10; .64 MG/G; MG/G
CREAM TOPICAL 2 TIMES DAILY
Qty: 90 G | Refills: 1 | Status: SHIPPED | OUTPATIENT
Start: 2024-10-31

## 2024-11-22 ENCOUNTER — APPOINTMENT (EMERGENCY)
Dept: RADIOLOGY | Facility: HOSPITAL | Age: 84
DRG: 481 | End: 2024-11-22
Payer: MEDICARE

## 2024-11-22 ENCOUNTER — APPOINTMENT (EMERGENCY)
Dept: CT IMAGING | Facility: HOSPITAL | Age: 84
DRG: 481 | End: 2024-11-22
Payer: MEDICARE

## 2024-11-22 ENCOUNTER — HOSPITAL ENCOUNTER (INPATIENT)
Facility: HOSPITAL | Age: 84
LOS: 5 days | Discharge: HOME HEALTH | DRG: 481 | End: 2024-11-27
Attending: EMERGENCY MEDICINE | Admitting: FAMILY MEDICINE
Payer: MEDICARE

## 2024-11-22 ENCOUNTER — ANESTHESIA EVENT (INPATIENT)
Dept: PERIOP | Facility: HOSPITAL | Age: 84
DRG: 481 | End: 2024-11-22
Payer: MEDICARE

## 2024-11-22 DIAGNOSIS — Z01.810 PREOP CARDIOVASCULAR EXAM: ICD-10-CM

## 2024-11-22 DIAGNOSIS — S72.90XA FEMUR FRACTURE (HCC): Primary | ICD-10-CM

## 2024-11-22 DIAGNOSIS — R21 RASH: ICD-10-CM

## 2024-11-22 DIAGNOSIS — S72.141A CLOSED DISPLACED INTERTROCHANTERIC FRACTURE OF RIGHT FEMUR, INITIAL ENCOUNTER (HCC): ICD-10-CM

## 2024-11-22 PROBLEM — S72.91XA FEMUR FRACTURE, RIGHT (HCC): Status: ACTIVE | Noted: 2024-11-22

## 2024-11-22 LAB
4HR DELTA HS TROPONIN: 158 NG/L
ABO GROUP BLD: NORMAL
ALBUMIN SERPL BCG-MCNC: 4 G/DL (ref 3.5–5)
ALP SERPL-CCNC: 101 U/L (ref 34–104)
ALT SERPL W P-5'-P-CCNC: 23 U/L (ref 7–52)
ANION GAP SERPL CALCULATED.3IONS-SCNC: 7 MMOL/L (ref 4–13)
APTT PPP: 27 SECONDS (ref 23–34)
AST SERPL W P-5'-P-CCNC: 33 U/L (ref 13–39)
ATRIAL RATE: 85 BPM
BASOPHILS # BLD AUTO: 0.03 THOUSANDS/ÂΜL (ref 0–0.1)
BASOPHILS NFR BLD AUTO: 0 % (ref 0–1)
BILIRUB SERPL-MCNC: 1.09 MG/DL (ref 0.2–1)
BLD GP AB SCN SERPL QL: NEGATIVE
BUN SERPL-MCNC: 33 MG/DL (ref 5–25)
CALCIUM SERPL-MCNC: 9.8 MG/DL (ref 8.4–10.2)
CARDIAC TROPONIN I PNL SERPL HS: 164 NG/L (ref ?–50)
CARDIAC TROPONIN I PNL SERPL HS: 6 NG/L (ref ?–50)
CHLORIDE SERPL-SCNC: 107 MMOL/L (ref 96–108)
CO2 SERPL-SCNC: 22 MMOL/L (ref 21–32)
CREAT SERPL-MCNC: 0.65 MG/DL (ref 0.6–1.3)
EOSINOPHIL # BLD AUTO: 0.01 THOUSAND/ÂΜL (ref 0–0.61)
EOSINOPHIL NFR BLD AUTO: 0 % (ref 0–6)
ERYTHROCYTE [DISTWIDTH] IN BLOOD BY AUTOMATED COUNT: 14.6 % (ref 11.6–15.1)
GFR SERPL CREATININE-BSD FRML MDRD: 80 ML/MIN/1.73SQ M
GLUCOSE SERPL-MCNC: 128 MG/DL (ref 65–140)
GLUCOSE SERPL-MCNC: 159 MG/DL (ref 65–140)
HCT VFR BLD AUTO: 34.6 % (ref 34.8–46.1)
HGB BLD-MCNC: 11.8 G/DL (ref 11.5–15.4)
IMM GRANULOCYTES # BLD AUTO: 0.05 THOUSAND/UL (ref 0–0.2)
IMM GRANULOCYTES NFR BLD AUTO: 0 % (ref 0–2)
INR PPP: 0.96 (ref 0.85–1.19)
LYMPHOCYTES # BLD AUTO: 0.58 THOUSANDS/ÂΜL (ref 0.6–4.47)
LYMPHOCYTES NFR BLD AUTO: 5 % (ref 14–44)
MAGNESIUM SERPL-MCNC: 2.1 MG/DL (ref 1.9–2.7)
MCH RBC QN AUTO: 34.1 PG (ref 26.8–34.3)
MCHC RBC AUTO-ENTMCNC: 34.1 G/DL (ref 31.4–37.4)
MCV RBC AUTO: 100 FL (ref 82–98)
MONOCYTES # BLD AUTO: 1.04 THOUSAND/ÂΜL (ref 0.17–1.22)
MONOCYTES NFR BLD AUTO: 8 % (ref 4–12)
NEUTROPHILS # BLD AUTO: 10.65 THOUSANDS/ÂΜL (ref 1.85–7.62)
NEUTS SEG NFR BLD AUTO: 87 % (ref 43–75)
NRBC BLD AUTO-RTO: 0 /100 WBCS
P AXIS: 67 DEGREES
PLATELET # BLD AUTO: 172 THOUSANDS/UL (ref 149–390)
PMV BLD AUTO: 10.4 FL (ref 8.9–12.7)
POTASSIUM SERPL-SCNC: 4.6 MMOL/L (ref 3.5–5.3)
PR INTERVAL: 174 MS
PROT SERPL-MCNC: 6.9 G/DL (ref 6.4–8.4)
PROTHROMBIN TIME: 13.5 SECONDS (ref 12.3–15)
QRS AXIS: 44 DEGREES
QRSD INTERVAL: 80 MS
QT INTERVAL: 398 MS
QTC INTERVAL: 473 MS
RBC # BLD AUTO: 3.46 MILLION/UL (ref 3.81–5.12)
RH BLD: POSITIVE
SODIUM SERPL-SCNC: 136 MMOL/L (ref 135–147)
SPECIMEN EXPIRATION DATE: NORMAL
T WAVE AXIS: 35 DEGREES
VENTRICULAR RATE: 85 BPM
WBC # BLD AUTO: 12.36 THOUSAND/UL (ref 4.31–10.16)

## 2024-11-22 PROCEDURE — 82948 REAGENT STRIP/BLOOD GLUCOSE: CPT

## 2024-11-22 PROCEDURE — 70450 CT HEAD/BRAIN W/O DYE: CPT

## 2024-11-22 PROCEDURE — 85025 COMPLETE CBC W/AUTO DIFF WBC: CPT | Performed by: EMERGENCY MEDICINE

## 2024-11-22 PROCEDURE — 96365 THER/PROPH/DIAG IV INF INIT: CPT

## 2024-11-22 PROCEDURE — 85610 PROTHROMBIN TIME: CPT | Performed by: EMERGENCY MEDICINE

## 2024-11-22 PROCEDURE — 72125 CT NECK SPINE W/O DYE: CPT

## 2024-11-22 PROCEDURE — 99285 EMERGENCY DEPT VISIT HI MDM: CPT

## 2024-11-22 PROCEDURE — 80053 COMPREHEN METABOLIC PANEL: CPT | Performed by: EMERGENCY MEDICINE

## 2024-11-22 PROCEDURE — 96372 THER/PROPH/DIAG INJ SC/IM: CPT

## 2024-11-22 PROCEDURE — 93005 ELECTROCARDIOGRAM TRACING: CPT

## 2024-11-22 PROCEDURE — 71045 X-RAY EXAM CHEST 1 VIEW: CPT

## 2024-11-22 PROCEDURE — 99223 1ST HOSP IP/OBS HIGH 75: CPT | Performed by: STUDENT IN AN ORGANIZED HEALTH CARE EDUCATION/TRAINING PROGRAM

## 2024-11-22 PROCEDURE — 84484 ASSAY OF TROPONIN QUANT: CPT | Performed by: EMERGENCY MEDICINE

## 2024-11-22 PROCEDURE — 36415 COLL VENOUS BLD VENIPUNCTURE: CPT | Performed by: EMERGENCY MEDICINE

## 2024-11-22 PROCEDURE — 86900 BLOOD TYPING SEROLOGIC ABO: CPT | Performed by: EMERGENCY MEDICINE

## 2024-11-22 PROCEDURE — 86850 RBC ANTIBODY SCREEN: CPT | Performed by: EMERGENCY MEDICINE

## 2024-11-22 PROCEDURE — 83735 ASSAY OF MAGNESIUM: CPT | Performed by: EMERGENCY MEDICINE

## 2024-11-22 PROCEDURE — 74177 CT ABD & PELVIS W/CONTRAST: CPT

## 2024-11-22 PROCEDURE — 93010 ELECTROCARDIOGRAM REPORT: CPT | Performed by: INTERNAL MEDICINE

## 2024-11-22 PROCEDURE — 99223 1ST HOSP IP/OBS HIGH 75: CPT | Performed by: ORTHOPAEDIC SURGERY

## 2024-11-22 PROCEDURE — 85730 THROMBOPLASTIN TIME PARTIAL: CPT | Performed by: EMERGENCY MEDICINE

## 2024-11-22 PROCEDURE — 99285 EMERGENCY DEPT VISIT HI MDM: CPT | Performed by: EMERGENCY MEDICINE

## 2024-11-22 PROCEDURE — 71260 CT THORAX DX C+: CPT

## 2024-11-22 PROCEDURE — 73552 X-RAY EXAM OF FEMUR 2/>: CPT

## 2024-11-22 PROCEDURE — 84484 ASSAY OF TROPONIN QUANT: CPT | Performed by: STUDENT IN AN ORGANIZED HEALTH CARE EDUCATION/TRAINING PROGRAM

## 2024-11-22 PROCEDURE — 86901 BLOOD TYPING SEROLOGIC RH(D): CPT | Performed by: EMERGENCY MEDICINE

## 2024-11-22 RX ORDER — ACETAMINOPHEN 325 MG/1
975 TABLET ORAL EVERY 8 HOURS SCHEDULED
Status: DISCONTINUED | OUTPATIENT
Start: 2024-11-22 | End: 2024-11-23

## 2024-11-22 RX ORDER — ALPRAZOLAM 0.25 MG/1
0.25 TABLET ORAL DAILY PRN
Status: DISCONTINUED | OUTPATIENT
Start: 2024-11-22 | End: 2024-11-27 | Stop reason: HOSPADM

## 2024-11-22 RX ORDER — SENNOSIDES 8.6 MG
1 TABLET ORAL DAILY
Status: DISCONTINUED | OUTPATIENT
Start: 2024-11-22 | End: 2024-11-27 | Stop reason: HOSPADM

## 2024-11-22 RX ORDER — CHLORHEXIDINE GLUCONATE ORAL RINSE 1.2 MG/ML
15 SOLUTION DENTAL ONCE
Status: COMPLETED | OUTPATIENT
Start: 2024-11-22 | End: 2024-11-22

## 2024-11-22 RX ORDER — HALOPERIDOL 5 MG/ML
5 INJECTION INTRAMUSCULAR ONCE
Status: COMPLETED | OUTPATIENT
Start: 2024-11-22 | End: 2024-11-22

## 2024-11-22 RX ORDER — ENOXAPARIN SODIUM 100 MG/ML
30 INJECTION SUBCUTANEOUS ONCE
Status: COMPLETED | OUTPATIENT
Start: 2024-11-22 | End: 2024-11-22

## 2024-11-22 RX ORDER — TRANEXAMIC ACID 10 MG/ML
1000 INJECTION, SOLUTION INTRAVENOUS ONCE
Status: COMPLETED | OUTPATIENT
Start: 2024-11-22 | End: 2024-11-22

## 2024-11-22 RX ORDER — ACETAMINOPHEN 10 MG/ML
1000 INJECTION, SOLUTION INTRAVENOUS ONCE
Status: COMPLETED | OUTPATIENT
Start: 2024-11-22 | End: 2024-11-22

## 2024-11-22 RX ADMIN — TRANEXAMIC ACID 1000 MG: 10 INJECTION, SOLUTION INTRAVENOUS at 15:06

## 2024-11-22 RX ADMIN — IOHEXOL 100 ML: 350 INJECTION, SOLUTION INTRAVENOUS at 13:14

## 2024-11-22 RX ADMIN — HALOPERIDOL LACTATE 5 MG: 5 INJECTION, SOLUTION INTRAMUSCULAR at 13:02

## 2024-11-22 RX ADMIN — ENOXAPARIN SODIUM 30 MG: 30 INJECTION SUBCUTANEOUS at 21:11

## 2024-11-22 RX ADMIN — CHLORHEXIDINE GLUCONATE 0.12% ORAL RINSE 15 ML: 1.2 LIQUID ORAL at 21:15

## 2024-11-22 RX ADMIN — ACETAMINOPHEN 975 MG: 325 TABLET, FILM COATED ORAL at 21:09

## 2024-11-22 RX ADMIN — ACETAMINOPHEN 1000 MG: 10 INJECTION INTRAVENOUS at 13:28

## 2024-11-22 RX ADMIN — SODIUM CHLORIDE 1000 ML: 0.9 INJECTION, SOLUTION INTRAVENOUS at 13:29

## 2024-11-22 NOTE — H&P
H&P - Hospitalist   Name: Suri Patel 84 y.o. female I MRN: [id number]  Unit/Bed#: ED 12 I Date of Admission: 11/22/2024   Date of Service: 11/22/2024 I Hospital Day: 0     Assessment & Plan  Femur fracture, right (HCC)  TOR poe 2/2 this   Unwitnessed mechanical fall right femoral neck  Was very mobile prior to fall  Ortho consulted  Pain meds administered  Will keep n.p.o. for now until Ortho evaluation  Medically optimized  Refusal of blood product  Healthcare proxy is at bedside states that patient does not want blood even in emergency situations, have paperwork with her signature  Dementia (HCC)  At baseline  Rash  Patient has had it for months, caretaker with Vaseline and antifungal  Some areas correlating to appointment placement are very red and tender looking-will monitor  Derm consult      VTE Pharmacologic Prophylaxis:   Moderate Risk (Score 3-4) - Pharmacological DVT Prophylaxis Contraindicated. Sequential Compression Devices Ordered.  Code Status: Level 3 - DNAR and DNI   Discussion with family: Updated  (caretakers) at bedside.    Anticipated Length of Stay: Patient will be admitted on an inpatient basis with an anticipated length of stay of greater than 2 midnights secondary to hip fracture.    History of Present Illness   Chief Complaint: Fall    Suri Patel is a 84 y.o. female with a PMH of dementia   who presents after mechanical fall at home this morning.    Review of Systems   Unable to perform ROS: Dementia       Historical Information   Past Medical History:   Diagnosis Date    Cellulitis of right lower extremity 6/21/2022    Dementia (HCC)     Memory loss 2018    Mild protein-calorie malnutrition (HCC)  7/28/2021     Past Surgical History:   Procedure Laterality Date    ANKLE FRACTURE SURGERY  1996     Social History     Tobacco Use    Smoking status: Never    Smokeless tobacco: Never   Vaping Use    Vaping status: Never Used   Substance and Sexual Activity    Alcohol  use: Not Currently    Drug use: No    Sexual activity: Not Currently     Partners: Male     Birth control/protection: None     E-Cigarette/Vaping    E-Cigarette Use Never User      E-Cigarette/Vaping Substances     Family history non-contributory  Social History:  Marital Status:      Meds/Allergies   I have reviewed home medications with patient family member.  Prior to Admission medications    Medication Sig Start Date End Date Taking? Authorizing Provider   ALPRAZolam (XANAX) 0.25 mg tablet Take 1 tablet (0.25 mg total) by mouth daily as needed for anxiety  Patient not taking: Reported on 11/22/2024 8/12/24   Aline Vincent, DO   clotrimazole-betamethasone (LOTRISONE) 1-0.05 % cream Apply topically 2 (two) times a day 10/31/24   Aline Vincent, DO   Incontinence Supplies (Wings Incontinence Pants S/M) MISC Use 6 (six) times a day 9/27/24   Aline Vincent, DO     Allergies   Allergen Reactions    Dye [Iodinated Contrast Media] Tachycardia    Prednisone Other (See Comments)     Insomnia, Delirium/Anger -- was given oral steroid for rash while hospitalized, required 1:1 and restraints        Objective :  Temp:  [97.5 °F (36.4 °C)] 97.5 °F (36.4 °C)  HR:  [] 115  BP: (135-141)/() 141/100  Resp:  [19-22] 22  SpO2:  [91 %-100 %] 97 %  O2 Device: None (Room air)    Physical Exam  Constitutional:       General: She is not in acute distress.  HENT:      Head: Normocephalic and atraumatic.   Cardiovascular:      Rate and Rhythm: Normal rate and regular rhythm.      Heart sounds: Normal heart sounds.   Pulmonary:      Effort: Pulmonary effort is normal.   Abdominal:      General: There is no distension.      Palpations: Abdomen is soft.      Tenderness: There is no abdominal tenderness.   Musculoskeletal:      Comments: RLE shortened, externally rotated   Skin:     Findings: Rash present.      Comments: Two types of rashes, UE- small papular, erythematic  LE large crusted, erythematous lesions   "  Neurological:      Mental Status: She is alert. Mental status is at baseline. She is disoriented.              Lab Results: I have reviewed the following results:  Results from last 7 days   Lab Units 11/22/24  1248   WBC Thousand/uL 12.36*   HEMOGLOBIN g/dL 11.8   HEMATOCRIT % 34.6*   PLATELETS Thousands/uL 172   SEGS PCT % 87*   LYMPHO PCT % 5*   MONO PCT % 8   EOS PCT % 0     Results from last 7 days   Lab Units 11/22/24  1248   SODIUM mmol/L 136   POTASSIUM mmol/L 4.6   CHLORIDE mmol/L 107   CO2 mmol/L 22   BUN mg/dL 33*   CREATININE mg/dL 0.65   ANION GAP mmol/L 7   CALCIUM mg/dL 9.8   ALBUMIN g/dL 4.0   TOTAL BILIRUBIN mg/dL 1.09*   ALK PHOS U/L 101   ALT U/L 23   AST U/L 33   GLUCOSE RANDOM mg/dL 159*     Results from last 7 days   Lab Units 11/22/24  1248   INR  0.96     Results from last 7 days   Lab Units 11/22/24  1256   POC GLUCOSE mg/dl 128     No results found for: \"HGBA1C\"        Imaging Results Review: I reviewed radiology reports from this admission including: xray(s).  Other Study Results Review: EKG was reviewed.     Administrative Statements     ** Please Note: This note has been constructed using a voice recognition system. **    "

## 2024-11-22 NOTE — ASSESSMENT & PLAN NOTE
Patient has had it for months, caretaker with Vaseline and antifungal  Some areas correlating to appointment placement are very red and tender looking-will monitor  Derm consult

## 2024-11-22 NOTE — ASSESSMENT & PLAN NOTE
SIRS lui 2/2 this   Unwitnessed mechanical fall right femoral neck  Was very mobile prior to fall  Ortho consulted  Pain meds administered  Will keep n.p.o. for now until Ortho evaluation  Medically optimized

## 2024-11-22 NOTE — ASSESSMENT & PLAN NOTE
- Plan for OR tomorrow with Dr Castorena for intramedullary fixation right hip  - SLIM clearance to be obtained  - Refusal of blood products  - Consent obtained  - NPO 0001 11/23/24  - pain control per primary  - DVT ppx: lovenox 30 mg one dose tonight, hold thereafter  - Dispo: will follow patient post operatively

## 2024-11-22 NOTE — CONSULTS
Consultation - Orthopedics   Name: Suri Patel 84 y.o. female I MRN: [id number]  Unit/Bed#: 2 E 254-01 I Date of Admission: 11/22/2024   Date of Service: 11/22/2024 I Hospital Day: 0   Inpatient consult to Orthopedic Surgery  Consult performed by: Noel Jara PA-C  Consult ordered by: Mervin Hunter MD        Physician Requesting Evaluation: Mervin Hunter MD   Reason for Evaluation / Principal Problem: right proximal femur fracture    Assessment & Plan  Femur fracture, right (HCC)  - Plan for OR tomorrow with Dr Castorena for intramedullary fixation right hip  - SLIM clearance to be obtained  - Refusal of blood products  - Consent obtained  - NPO 0001 11/23/24  - pain control per primary  - DVT ppx: lovenox 30 mg one dose tonight, hold thereafter  - Dispo: will follow patient post operatively      Refusal of blood product  - Patient does not want blood in any situation, paperwork available with patient signature  Dementia ([facility])    Rash    I have discussed the above management plan in detail with the primary service.     History of Present Illness   HPI: Suri Patel is a 84 y.o. year old female with PMH of dementia who presents with right hip pain and inability to ambulate following a fall at home. History is provided by 2 of her cousin's children who are present today. Patient lives alone in a duplex with other family nearby with frequent monitoring. At baseline she ambulates without assistance. The fall was unwitnessed but patient was found on the ground unable to ambulate and was taken to the ED. Previous surgical history includes ORIF right ankle without complications. Denies history of diabetes. Denies smoking history. Denies blood thinner. Patient is unable to verbalize pain or to answer questions secondary to dementia.    Review of Systems significant for findings described in the HPI.  I have reviewed the patient's PMH, PSH, Social History, Family History, Meds, and Allergies    Objective :  Temp:   "[97.5 °F (36.4 °C)] 97.5 °F (36.4 °C)  HR:  [] 115  BP: (135-141)/() 141/100  Resp:  [19-22] 22  SpO2:  [91 %-100 %] 97 %  O2 Device: None (Room air)  Physical ExamOrtho Exam     Right lower extremity    Patient resting comfortably in bed  Right lower extremity shortened and externally rotated  No erythema or ecchymosis to the hip  Mild discomfort with palpation of the greater trochanter  Discomfort with log roll  Fires EPL, FPL, FHL  Unable to assess sensation secondary to dementia  Palpable DP pulse        Lab Results: I have reviewed the following results:   Recent Labs     11/22/24  1248   WBC 12.36*   HGB 11.8   HCT 34.6*      BUN 33*   CREATININE 0.65   PTT 27   INR 0.96     Blood Culture:   Lab Results   Component Value Date    BLOODCX No Growth After 5 Days. 06/21/2022     Wound Culture: No results found for: \"WOUNDCULT\"    Imaging Results Review: I personally reviewed the following image studies/reports in PACS and discussed pertinent findings with Radiology: xray(s). My interpretation of the radiology images/reports is: xray of the right femur taken today, 11/22/24 demonstrates mildly displaced intertrochanteric fracture of the right femur. No other acute fracture or dislocation is demonstrated..    "

## 2024-11-22 NOTE — ASSESSMENT & PLAN NOTE
Healthcare proxy is at bedside states that patient does not want blood even in emergency situations, have paperwork with her signature

## 2024-11-23 ENCOUNTER — APPOINTMENT (INPATIENT)
Dept: RADIOLOGY | Facility: HOSPITAL | Age: 84
DRG: 481 | End: 2024-11-23
Payer: MEDICARE

## 2024-11-23 ENCOUNTER — TELEPHONE (OUTPATIENT)
Dept: DERMATOLOGY | Facility: CLINIC | Age: 84
End: 2024-11-23

## 2024-11-23 ENCOUNTER — ANESTHESIA (INPATIENT)
Dept: PERIOP | Facility: HOSPITAL | Age: 84
DRG: 481 | End: 2024-11-23
Payer: MEDICARE

## 2024-11-23 PROBLEM — R79.89 ELEVATED TROPONIN: Status: ACTIVE | Noted: 2024-11-23

## 2024-11-23 LAB
2HR DELTA HS TROPONIN: 79 NG/L
ANION GAP SERPL CALCULATED.3IONS-SCNC: 4 MMOL/L (ref 4–13)
BASOPHILS # BLD AUTO: 0.03 THOUSANDS/ÂΜL (ref 0–0.1)
BASOPHILS NFR BLD AUTO: 0 % (ref 0–1)
BUN SERPL-MCNC: 27 MG/DL (ref 5–25)
CALCIUM SERPL-MCNC: 8.9 MG/DL (ref 8.4–10.2)
CARDIAC TROPONIN I PNL SERPL HS: 85 NG/L (ref ?–50)
CHLORIDE SERPL-SCNC: 109 MMOL/L (ref 96–108)
CO2 SERPL-SCNC: 24 MMOL/L (ref 21–32)
CREAT SERPL-MCNC: 0.58 MG/DL (ref 0.6–1.3)
EOSINOPHIL # BLD AUTO: 0.01 THOUSAND/ÂΜL (ref 0–0.61)
EOSINOPHIL NFR BLD AUTO: 0 % (ref 0–6)
ERYTHROCYTE [DISTWIDTH] IN BLOOD BY AUTOMATED COUNT: 15.3 % (ref 11.6–15.1)
GFR SERPL CREATININE-BSD FRML MDRD: 83 ML/MIN/1.73SQ M
GLUCOSE SERPL-MCNC: 129 MG/DL (ref 65–140)
HCT VFR BLD AUTO: 32.8 % (ref 34.8–46.1)
HGB BLD-MCNC: 11.1 G/DL (ref 11.5–15.4)
IMM GRANULOCYTES # BLD AUTO: 0.04 THOUSAND/UL (ref 0–0.2)
IMM GRANULOCYTES NFR BLD AUTO: 1 % (ref 0–2)
LYMPHOCYTES # BLD AUTO: 0.66 THOUSANDS/ÂΜL (ref 0.6–4.47)
LYMPHOCYTES NFR BLD AUTO: 8 % (ref 14–44)
MCH RBC QN AUTO: 34.5 PG (ref 26.8–34.3)
MCHC RBC AUTO-ENTMCNC: 33.8 G/DL (ref 31.4–37.4)
MCV RBC AUTO: 102 FL (ref 82–98)
MONOCYTES # BLD AUTO: 0.84 THOUSAND/ÂΜL (ref 0.17–1.22)
MONOCYTES NFR BLD AUTO: 10 % (ref 4–12)
NEUTROPHILS # BLD AUTO: 6.67 THOUSANDS/ÂΜL (ref 1.85–7.62)
NEUTS SEG NFR BLD AUTO: 81 % (ref 43–75)
NRBC BLD AUTO-RTO: 0 /100 WBCS
PLATELET # BLD AUTO: 152 THOUSANDS/UL (ref 149–390)
PMV BLD AUTO: 9.9 FL (ref 8.9–12.7)
POTASSIUM SERPL-SCNC: 3.8 MMOL/L (ref 3.5–5.3)
RBC # BLD AUTO: 3.22 MILLION/UL (ref 3.81–5.12)
SODIUM SERPL-SCNC: 137 MMOL/L (ref 135–147)
WBC # BLD AUTO: 8.25 THOUSAND/UL (ref 4.31–10.16)

## 2024-11-23 PROCEDURE — C1713 ANCHOR/SCREW BN/BN,TIS/BN: HCPCS | Performed by: ORTHOPAEDIC SURGERY

## 2024-11-23 PROCEDURE — 99232 SBSQ HOSP IP/OBS MODERATE 35: CPT | Performed by: STUDENT IN AN ORGANIZED HEALTH CARE EDUCATION/TRAINING PROGRAM

## 2024-11-23 PROCEDURE — NC001 PR NO CHARGE: Performed by: DERMATOLOGY

## 2024-11-23 PROCEDURE — C1769 GUIDE WIRE: HCPCS | Performed by: ORTHOPAEDIC SURGERY

## 2024-11-23 PROCEDURE — 85025 COMPLETE CBC W/AUTO DIFF WBC: CPT | Performed by: STUDENT IN AN ORGANIZED HEALTH CARE EDUCATION/TRAINING PROGRAM

## 2024-11-23 PROCEDURE — 73502 X-RAY EXAM HIP UNI 2-3 VIEWS: CPT

## 2024-11-23 PROCEDURE — NC001 PR NO CHARGE: Performed by: ORTHOPAEDIC SURGERY

## 2024-11-23 PROCEDURE — 0QS606Z REPOSITION RIGHT UPPER FEMUR WITH INTRAMEDULLARY INTERNAL FIXATION DEVICE, OPEN APPROACH: ICD-10-PCS | Performed by: ORTHOPAEDIC SURGERY

## 2024-11-23 PROCEDURE — 27245 TREAT THIGH FRACTURE: CPT | Performed by: ORTHOPAEDIC SURGERY

## 2024-11-23 PROCEDURE — 84484 ASSAY OF TROPONIN QUANT: CPT | Performed by: STUDENT IN AN ORGANIZED HEALTH CARE EDUCATION/TRAINING PROGRAM

## 2024-11-23 PROCEDURE — 80048 BASIC METABOLIC PNL TOTAL CA: CPT | Performed by: STUDENT IN AN ORGANIZED HEALTH CARE EDUCATION/TRAINING PROGRAM

## 2024-11-23 PROCEDURE — 99221 1ST HOSP IP/OBS SF/LOW 40: CPT | Performed by: INTERNAL MEDICINE

## 2024-11-23 PROCEDURE — NC001 PR NO CHARGE: Performed by: INTERNAL MEDICINE

## 2024-11-23 PROCEDURE — 27245 TREAT THIGH FRACTURE: CPT

## 2024-11-23 DEVICE — LOCKING SCREW FOR IM NAIL Ø 5MM/ 36MM/ XL25/ STERILE: Type: IMPLANTABLE DEVICE | Site: TROCHANTER | Status: FUNCTIONAL

## 2024-11-23 DEVICE — TFNA FENESTRATED HELICAL BLADE 95MM - STERILE
Type: IMPLANTABLE DEVICE | Site: TROCHANTER | Status: FUNCTIONAL
Brand: TFN-ADVANCE

## 2024-11-23 DEVICE — 10MM/130 DEG TI CANN TFNA 170MM - STERILE
Type: IMPLANTABLE DEVICE | Site: TROCHANTER | Status: FUNCTIONAL
Brand: TFN-ADVANCE

## 2024-11-23 RX ORDER — LIDOCAINE HYDROCHLORIDE 20 MG/ML
INJECTION, SOLUTION EPIDURAL; INFILTRATION; INTRACAUDAL; PERINEURAL AS NEEDED
Status: DISCONTINUED | OUTPATIENT
Start: 2024-11-23 | End: 2024-11-23

## 2024-11-23 RX ORDER — ONDANSETRON 2 MG/ML
INJECTION INTRAMUSCULAR; INTRAVENOUS AS NEEDED
Status: DISCONTINUED | OUTPATIENT
Start: 2024-11-23 | End: 2024-11-23

## 2024-11-23 RX ORDER — ONDANSETRON 2 MG/ML
4 INJECTION INTRAMUSCULAR; INTRAVENOUS ONCE AS NEEDED
Status: DISCONTINUED | OUTPATIENT
Start: 2024-11-23 | End: 2024-11-23 | Stop reason: HOSPADM

## 2024-11-23 RX ORDER — CEFAZOLIN SODIUM 2 G/50ML
2000 SOLUTION INTRAVENOUS ONCE
Status: COMPLETED | OUTPATIENT
Start: 2024-11-23 | End: 2024-11-23

## 2024-11-23 RX ORDER — SODIUM CHLORIDE, SODIUM LACTATE, POTASSIUM CHLORIDE, CALCIUM CHLORIDE 600; 310; 30; 20 MG/100ML; MG/100ML; MG/100ML; MG/100ML
INJECTION, SOLUTION INTRAVENOUS CONTINUOUS PRN
Status: DISCONTINUED | OUTPATIENT
Start: 2024-11-23 | End: 2024-11-23

## 2024-11-23 RX ORDER — ROCURONIUM BROMIDE 10 MG/ML
INJECTION, SOLUTION INTRAVENOUS AS NEEDED
Status: DISCONTINUED | OUTPATIENT
Start: 2024-11-23 | End: 2024-11-23

## 2024-11-23 RX ORDER — FENTANYL CITRATE/PF 50 MCG/ML
25 SYRINGE (ML) INJECTION
Status: DISCONTINUED | OUTPATIENT
Start: 2024-11-23 | End: 2024-11-23 | Stop reason: HOSPADM

## 2024-11-23 RX ORDER — ENOXAPARIN SODIUM 100 MG/ML
30 INJECTION SUBCUTANEOUS
Status: DISCONTINUED | OUTPATIENT
Start: 2024-11-23 | End: 2024-11-27 | Stop reason: HOSPADM

## 2024-11-23 RX ORDER — DEXAMETHASONE SODIUM PHOSPHATE 10 MG/ML
INJECTION, SOLUTION INTRAMUSCULAR; INTRAVENOUS AS NEEDED
Status: DISCONTINUED | OUTPATIENT
Start: 2024-11-23 | End: 2024-11-23

## 2024-11-23 RX ORDER — CEFAZOLIN SODIUM 2 G/50ML
2000 SOLUTION INTRAVENOUS EVERY 8 HOURS
Status: COMPLETED | OUTPATIENT
Start: 2024-11-23 | End: 2024-11-24

## 2024-11-23 RX ORDER — FENTANYL CITRATE 50 UG/ML
INJECTION, SOLUTION INTRAMUSCULAR; INTRAVENOUS AS NEEDED
Status: DISCONTINUED | OUTPATIENT
Start: 2024-11-23 | End: 2024-11-23

## 2024-11-23 RX ORDER — TRANEXAMIC ACID 10 MG/ML
1000 INJECTION, SOLUTION INTRAVENOUS ONCE
Status: COMPLETED | OUTPATIENT
Start: 2024-11-23 | End: 2024-11-23

## 2024-11-23 RX ORDER — ACETAMINOPHEN 10 MG/ML
1000 INJECTION, SOLUTION INTRAVENOUS EVERY 6 HOURS SCHEDULED
Status: DISCONTINUED | OUTPATIENT
Start: 2024-11-23 | End: 2024-11-27 | Stop reason: HOSPADM

## 2024-11-23 RX ORDER — PROPOFOL 10 MG/ML
INJECTION, EMULSION INTRAVENOUS AS NEEDED
Status: DISCONTINUED | OUTPATIENT
Start: 2024-11-23 | End: 2024-11-23

## 2024-11-23 RX ORDER — MAGNESIUM HYDROXIDE 1200 MG/15ML
LIQUID ORAL AS NEEDED
Status: DISCONTINUED | OUTPATIENT
Start: 2024-11-23 | End: 2024-11-23 | Stop reason: HOSPADM

## 2024-11-23 RX ADMIN — ACETAMINOPHEN 1000 MG: 10 INJECTION INTRAVENOUS at 17:44

## 2024-11-23 RX ADMIN — MORPHINE SULFATE 2 MG: 2 INJECTION, SOLUTION INTRAMUSCULAR; INTRAVENOUS at 16:26

## 2024-11-23 RX ADMIN — FENTANYL CITRATE 25 MCG: 50 INJECTION, SOLUTION INTRAMUSCULAR; INTRAVENOUS at 09:43

## 2024-11-23 RX ADMIN — PHENYLEPHRINE HYDROCHLORIDE 40 MCG/MIN: 10 INJECTION INTRAVENOUS at 09:34

## 2024-11-23 RX ADMIN — MORPHINE SULFATE 2 MG: 2 INJECTION, SOLUTION INTRAMUSCULAR; INTRAVENOUS at 00:40

## 2024-11-23 RX ADMIN — ENOXAPARIN SODIUM 30 MG: 30 INJECTION SUBCUTANEOUS at 20:39

## 2024-11-23 RX ADMIN — CEFAZOLIN SODIUM 2000 MG: 2 SOLUTION INTRAVENOUS at 16:52

## 2024-11-23 RX ADMIN — LIDOCAINE HYDROCHLORIDE 50 MG: 20 INJECTION, SOLUTION EPIDURAL; INFILTRATION; INTRACAUDAL; PERINEURAL at 09:22

## 2024-11-23 RX ADMIN — FENTANYL CITRATE 25 MCG: 50 INJECTION, SOLUTION INTRAMUSCULAR; INTRAVENOUS at 09:20

## 2024-11-23 RX ADMIN — TRANEXAMIC ACID 1000 MG: 10 INJECTION, SOLUTION INTRAVENOUS at 09:47

## 2024-11-23 RX ADMIN — SUGAMMADEX 200 MG: 100 INJECTION, SOLUTION INTRAVENOUS at 10:32

## 2024-11-23 RX ADMIN — PROPOFOL 140 MG: 10 INJECTION, EMULSION INTRAVENOUS at 09:22

## 2024-11-23 RX ADMIN — ROCURONIUM BROMIDE 20 MG: 10 INJECTION, SOLUTION INTRAVENOUS at 09:28

## 2024-11-23 RX ADMIN — ROCURONIUM BROMIDE 30 MG: 10 INJECTION, SOLUTION INTRAVENOUS at 09:22

## 2024-11-23 RX ADMIN — ONDANSETRON 4 MG: 2 INJECTION INTRAMUSCULAR; INTRAVENOUS at 09:50

## 2024-11-23 RX ADMIN — DEXAMETHASONE SODIUM PHOSPHATE 10 MG: 10 INJECTION INTRAMUSCULAR; INTRAVENOUS at 09:43

## 2024-11-23 RX ADMIN — SODIUM CHLORIDE, SODIUM LACTATE, POTASSIUM CHLORIDE, AND CALCIUM CHLORIDE: .6; .31; .03; .02 INJECTION, SOLUTION INTRAVENOUS at 09:18

## 2024-11-23 RX ADMIN — ACETAMINOPHEN 1000 MG: 10 INJECTION INTRAVENOUS at 13:12

## 2024-11-23 RX ADMIN — CEFAZOLIN SODIUM 2000 MG: 2 SOLUTION INTRAVENOUS at 09:28

## 2024-11-23 NOTE — ASSESSMENT & PLAN NOTE
-Patient with recent fall where she sustained right hip fracture  - Orthopedic surgery plan for surgical intervention  - Given patient's dementia she is at moderate risk for the planned procedure  - Per her family she is very sensitive to medications, therefore we will hold off on any adjustments to her medications at this time

## 2024-11-23 NOTE — ASSESSMENT & PLAN NOTE
- Plan for OR today the with Dr Castorena for intramedullary fixation right hip pending SLIM clearance   - Pt troponin was at 164, SLIM plans to repeat Troponin prior to OR   NPO status confirmed  Preoperative antibiotics ordered  Intraoperative TXA ordered  - Refusal of blood products  - Consent obtained  - NPO 0001 11/23/24  -DVT ppx: lovenox 30 mg one dose tonight, hold thereafter.  Await further DVT prophylaxis recommendations pending or  Pain control per primary team  Incentive spirometry  PT/OT as tolerated  - Dispo: will follow patient post operatively

## 2024-11-23 NOTE — ASSESSMENT & PLAN NOTE
-Elevated with peak of 164, has since trended down  - Likely secondary to fall and femur fracture  - Discussed with Kerrie family, will hold off on any medical management at this time, they also expressed wishes not to proceed with any type of invasive procedure such as coronary angiography and patient's history of dementia and advanced age

## 2024-11-23 NOTE — PROGRESS NOTES
Progress Note - Orthopedics   Name: Suri Patel 84 y.o. female I MRN: [id number]  Unit/Bed#: 2 E 254-01 I Date of Admission: 11/22/2024   Date of Service: 11/23/2024 I Hospital Day: 1    Assessment & Plan  Femur fracture, right (HCC)  - Plan for OR today the with Dr Castorena for intramedullary fixation right hip pending SLIM clearance   - Pt troponin was at 164, SLIM plans to repeat Troponin prior to OR   NPO status confirmed  Preoperative antibiotics ordered  Intraoperative TXA ordered  - Refusal of blood products  - Consent obtained  - NPO 0001 11/23/24  -DVT ppx: lovenox 30 mg one dose tonight, hold thereafter.  Await further DVT prophylaxis recommendations pending or  Pain control per primary team  Incentive spirometry  PT/OT as tolerated  - Dispo: will follow patient post operatively      Refusal of blood product  - Patient does not want blood in any situation, paperwork available with patient signature    Orthopedics service will follow.  Please contact the SecureChat role for the Orthopedics service with any questions/concerns.    Subjective   84 y.o.female seen examined at bedside with family present.  History provided by patient's family members given patient's altered mental status with dementia.  Per family no acute events overnight.  Patient does not seem to be complaining of pain.  Patient unable to answer questions secondary to dementia or verbalize pain.    Objective :  Temp:  [97.5 °F (36.4 °C)-98.5 °F (36.9 °C)] 98.5 °F (36.9 °C)  HR:  [] 106  BP: (108-141)/() 129/78  Resp:  [18-22] 18  SpO2:  [91 %-100 %] 97 %  O2 Device: Nasal cannula  Nasal Cannula O2 Flow Rate (L/min):  [2 L/min] 2 L/min    Physical Exam  Musculoskeletal: rightlower  Skin intact. No erythema or ecchymosis.  TTP diffusely over right hip  Sensation and motor exam not able to be completed due to patient's significant dementia  2+ dorsalis pedis pulse comparable contralateral side  Brisk capillary refill in all 5  "digits  Thigh and calf soft and easily compressible        Lab Results: I have reviewed the following results:  Recent Labs     11/22/24  1248 11/23/24  0617   WBC 12.36* 8.25   HGB 11.8 11.1*   HCT 34.6* 32.8*    152   BUN 33* 27*   CREATININE 0.65 0.58*   PTT 27  --    INR 0.96  --      Blood Culture:    Lab Results   Component Value Date    BLOODCX No Growth After 5 Days. 06/21/2022     Wound Culture: No results found for: \"WOUNDCULT\"  "

## 2024-11-23 NOTE — PROGRESS NOTES
Progress Note - Hospitalist   Name: Suri Patel 84 y.o. female I MRN: [id number]  Unit/Bed#: 2 E 254-01 I Date of Admission: 11/22/2024   Date of Service: 11/23/2024 I Hospital Day: 1    Assessment & Plan  Femur fracture, right (HCC)  Status post ORIF this morning  Lovenox for DVT prophylaxis for 4 weeks per Ortho  PT/OT  Pain control  Refusal of blood product  Healthcare proxy is at bedside states that patient does not want blood even in emergency situations, have paperwork with her signature  Dementia (HCC)  At baseline  Rash  Derm consulted, photos in epic  Elevated troponin  Significant delta Trope yesterday, repeated this morning  Cardio consulted, likely stress from fracture  No further wound workup indicated or desired per family    VTE Pharmacologic Prophylaxis:   Lovenox    Mobility:   Basic Mobility Inpatient Raw Score: 11  -HLM Goal: 4: Move to chair/commode  JH-HLM Achieved: 1: Laying in bed  JH-HLM Goal NOT achieved. Continue with multidisciplinary rounding and encourage appropriate mobility to improve upon JH-HLM goals.    Patient Centered Rounds: I performed bedside rounds with nursing staff today.   Discussions with Specialists or Other Care Team Provider: Ortho, cardio, Derm    Education and Discussions with Family / Patient: Updated  (caregivers) at bedside.    Current Length of Stay: 1 day(s)  Current Patient Status: Inpatient   Certification Statement: The patient will continue to require additional inpatient hospital stay due to postop, pt/ot  Discharge Plan: Anticipate discharge in 24-48 hrs to rehab facility.    Code Status: Level 3 - DNAR and DNI    Subjective   Sitting in bed, eating with caregivers at bedside.  Switching in and out of [place] and English.  Pleasantly confused.    Objective :  Temp:  [97.6 °F (36.4 °C)-99.3 °F (37.4 °C)] 97.7 °F (36.5 °C)  HR:  [] 83  BP: (108-155)/() 142/65  Resp:  [16-22] 16  SpO2:  [91 %-100 %] 96 %  O2 Device: None (Room  air)  Nasal Cannula O2 Flow Rate (L/min):  [2 L/min] 2 L/min    There is no height or weight on file to calculate BMI.     Input and Output Summary (last 24 hours):     Intake/Output Summary (Last 24 hours) at 11/23/2024 1351  Last data filed at 11/23/2024 1025  Gross per 24 hour   Intake 200 ml   Output 350 ml   Net -150 ml       Physical Exam  Constitutional:       Appearance: Normal appearance.   HENT:      Head: Normocephalic and atraumatic.   Cardiovascular:      Rate and Rhythm: Normal rate and regular rhythm.      Heart sounds: Normal heart sounds.   Abdominal:      General: There is no distension.      Palpations: Abdomen is soft.      Tenderness: There is no abdominal tenderness.   Musculoskeletal:      Comments: RLE with bandage   Skin:     General: Skin is warm.      Findings: Rash present.      Comments: 2 types of rashes noted  Erythematous, papular on right upper extremity and back  Lateral lower extremities with larger areas of raised erythematous, scaly lesions   Neurological:      Mental Status: She is alert. Mental status is at baseline. She is disoriented.         Lines/Drains:  Lines/Drains/Airways       Active Status       Name Placement date Placement time Site Days    External Urinary Catheter 11/22/24  1900  -- less than 1                            Lab Results: I have reviewed the following results:   Results from last 7 days   Lab Units 11/23/24  0617   WBC Thousand/uL 8.25   HEMOGLOBIN g/dL 11.1*   HEMATOCRIT % 32.8*   PLATELETS Thousands/uL 152   SEGS PCT % 81*   LYMPHO PCT % 8*   MONO PCT % 10   EOS PCT % 0     Results from last 7 days   Lab Units 11/23/24  0617 11/22/24  1248   SODIUM mmol/L 137 136   POTASSIUM mmol/L 3.8 4.6   CHLORIDE mmol/L 109* 107   CO2 mmol/L 24 22   BUN mg/dL 27* 33*   CREATININE mg/dL 0.58* 0.65   ANION GAP mmol/L 4 7   CALCIUM mg/dL 8.9 9.8   ALBUMIN g/dL  --  4.0   TOTAL BILIRUBIN mg/dL  --  1.09*   ALK PHOS U/L  --  101   ALT U/L  --  23   AST U/L  --  33    GLUCOSE RANDOM mg/dL 129 159*     Results from last 7 days   Lab Units 11/22/24  1248   INR  0.96     Results from last 7 days   Lab Units 11/22/24  1256   POC GLUCOSE mg/dl 128               Recent Cultures (last 7 days):         Last 24 Hours Medication List:     Current Facility-Administered Medications:     acetaminophen (Ofirmev) injection 1,000 mg, Q6H MISSY, Last Rate: 1,000 mg (11/23/24 1312)    ALPRAZolam (XANAX) tablet 0.25 mg, Daily PRN    ceFAZolin (ANCEF) IVPB (premix in dextrose) 2,000 mg 50 mL, Q8H    enoxaparin (LOVENOX) subcutaneous injection 30 mg, Q24H MISSY    lactated ringers bolus 1,000 mL, Once PRN **AND** lactated ringers bolus 1,000 mL, Once PRN    magnesium hydroxide (MILK OF MAGNESIA) oral suspension 30 mL, Daily PRN    morphine injection 2 mg, Q2H PRN    senna (SENOKOT) tablet 8.6 mg, Daily    sodium chloride 0.9 % bolus 1,000 mL, Once PRN **AND** sodium chloride 0.9 % bolus 1,000 mL, Once PRN    Administrative Statements   Today, Patient Was Seen By: Mervin Hunter MD      **Please Note: This note may have been constructed using a voice recognition system.**

## 2024-11-23 NOTE — PHYSICAL THERAPY NOTE
Physical Therapy Cancellation Note           11/23/24 0805   Note Type   Note type Cancelled Session   Cancel Reasons Patient to operating room   Additional Comments Pt to go to OR today for right IM nail, will follow up post op.     Braeden Ahumada, PT, DPT

## 2024-11-23 NOTE — PLAN OF CARE
Problem: Prexisting or High Potential for Compromised Skin Integrity  Goal: Skin integrity is maintained or improved  Description: INTERVENTIONS:  - Identify patients at risk for skin breakdown  - Assess and monitor skin integrity  - Assess and monitor nutrition and hydration status  - Monitor labs   - Assess for incontinence   - Turn and reposition patient  - Assist with mobility/ambulation  - Relieve pressure over bony prominences  - Avoid friction and shearing  - Provide appropriate hygiene as needed including keeping skin clean and dry  - Evaluate need for skin moisturizer/barrier cream  - Collaborate with interdisciplinary team   - Patient/family teaching  - Consider wound care consult   Outcome: Progressing     Problem: PAIN - ADULT  Goal: Verbalizes/displays adequate comfort level or baseline comfort level  Description: Interventions:  - Encourage patient to monitor pain and request assistance  - Assess pain using appropriate pain scale  - Administer analgesics based on type and severity of pain and evaluate response  - Implement non-pharmacological measures as appropriate and evaluate response  - Consider cultural and social influences on pain and pain management  - Notify physician/advanced practitioner if interventions unsuccessful or patient reports new pain  Outcome: Progressing     Problem: INFECTION - ADULT  Goal: Absence or prevention of progression during hospitalization  Description: INTERVENTIONS:  - Assess and monitor for signs and symptoms of infection  - Monitor lab/diagnostic results  - Monitor all insertion sites, i.e. indwelling lines, tubes, and drains  - Monitor endotracheal if appropriate and nasal secretions for changes in amount and color  - [place] appropriate cooling/warming therapies per order  - Administer medications as ordered  - Instruct and encourage patient and family to use good hand hygiene technique  - Identify and instruct in appropriate isolation precautions for  identified infection/condition  Outcome: Progressing  Goal: Absence of fever/infection during neutropenic period  Description: INTERVENTIONS:  - Monitor WBC    Outcome: Progressing     Problem: SAFETY ADULT  Goal: Patient will remain free of falls  Description: INTERVENTIONS:  - Educate patient/family on patient safety including physical limitations  - Instruct patient to call for assistance with activity   - Consult OT/PT to assist with strengthening/mobility   - Keep Call bell within reach  - Keep bed low and locked with side rails adjusted as appropriate  - Keep care items and personal belongings within reach  - Initiate and maintain comfort rounds  - Make Fall Risk Sign visible to staff  - Offer Toileting every 2 Hours, in advance of need  - Initiate/Maintain bed alarm  - Obtain necessary fall risk management equipment  - Apply yellow socks and bracelet for high fall risk patients  - Consider moving patient to room near nurses station  Outcome: Progressing  Goal: Maintain or return to baseline ADL function  Description: INTERVENTIONS:  -  Assess patient's ability to carry out ADLs; assess patient's baseline for ADL function and identify physical deficits which impact ability to perform ADLs (bathing, care of mouth/teeth, toileting, grooming, dressing, etc.)  - Assess/evaluate cause of self-care deficits   - Assess range of motion  - Assess patient's mobility; develop plan if impaired  - Assess patient's need for assistive devices and provide as appropriate  - Encourage maximum independence but intervene and supervise when necessary  - Involve family in performance of ADLs  - Assess for home care needs following discharge   - Consider OT consult to assist with ADL evaluation and planning for discharge  - Provide patient education as appropriate  Outcome: Progressing  Goal: Maintains/Returns to pre admission functional level  Description: INTERVENTIONS:  - Perform AM-PAC 6 Click Basic Mobility/ Daily Activity  assessment daily.  - Set and communicate daily mobility goal to care team and patient/family/caregiver.   - Collaborate with rehabilitation services on mobility goals if consulted  - Perform Range of Motion 4 times a day.  - Reposition patient every 2 hours.  - Dangle patient 4 times a day  - Stand patient 4 times a day  - Ambulate patient 3 times a day  - Out of bed to chair 3 times a day   - Out of bed for meals 3 times a day  - Out of bed for toileting  - Record patient progress and toleration of activity level   Outcome: Progressing     Problem: DISCHARGE PLANNING  Goal: Discharge to home or other facility with appropriate resources  Description: INTERVENTIONS:  - Identify barriers to discharge w/patient and caregiver  - Arrange for needed discharge resources and transportation as appropriate  - Identify discharge learning needs (meds, wound care, etc.)  - Arrange for interpretive services to assist at discharge as needed  - Refer to Case Management Department for coordinating discharge planning if the patient needs post-hospital services based on physician/advanced practitioner order or complex needs related to functional status, cognitive ability, or social support system  Outcome: Progressing     Problem: Knowledge Deficit  Goal: Patient/family/caregiver demonstrates understanding of disease process, treatment plan, medications, and discharge instructions  Description: Complete learning assessment and assess knowledge base.  Interventions:  - Provide teaching at level of understanding  - Provide teaching via preferred learning methods  Outcome: Progressing

## 2024-11-23 NOTE — OCCUPATIONAL THERAPY NOTE
Occupational Therapy         Patient Name: Suri Patel  Today's Date: 11/23/2024 11/23/24 0800   OT Last Visit   OT Visit Date 11/23/24   Note Type   Note type Cancelled Session   Cancel Reasons Patient to operating room   Additional Comments Pt to go to OR today for right IM nail, will follow up post op.

## 2024-11-23 NOTE — ANESTHESIA PREPROCEDURE EVALUATION
Procedure:  INSERTION NAIL IM FEMUR ANTEGRADE (TROCHANTERIC) (Right: Leg Upper)    Relevant Problems   ANESTHESIA (within normal limits)      CARDIO (within normal limits)   (+) Chronic venous hypertension (idiopathic) with ulcer of right lower extremity (HCC)   (-) CAD (coronary artery disease)   (-) MI (myocardial infarction) (HCC)      ENDO (within normal limits)   (-) Diabetes mellitus, type 2 (HCC)      GI/HEPATIC (within normal limits)  NPO confirmed with family  BMI 19.2   (-) Gastroesophageal reflux disease      /RENAL (within normal limits)      NEURO/PSYCH   (+) Dementia (HCC)   (-) CVA (cerebral vascular accident) ([facility])   (-) Seizures (HCC)      PULMONARY (within normal limits)   (-) Asthma   (-) Chronic obstructive pulmonary disease (HCC)   (-) Oxygen dependent   (-) Sleep apnea   (-) URI (upper respiratory infection)      Care Coordination   (+) Refusal of blood product      Orthopedic/Musculoskeletal   (+) Femur fracture, right (HCC)   -mild increased troponin this AM, no hx cardiac disease, tachycardic prior, cardiology consulted by [facility] in PACU, suspect demand in setting of tachycardia and pain, no EKG changes     Allergies   Allergen Reactions    Dye [Iodinated Contrast Media] Tachycardia    Prednisone Other (See Comments)     Insomnia, Delirium/Anger -- was given oral steroid for rash while hospitalized, required 1:1 and restraints      Social History     Tobacco Use    Smoking status: Never    Smokeless tobacco: Never   Vaping Use    Vaping status: Never Used   Substance Use Topics    Alcohol use: Not Currently    Drug use: No     Current Outpatient Medications   Medication Instructions    ALPRAZolam (XANAX) 0.25 mg, Oral, Daily PRN    clotrimazole-betamethasone (LOTRISONE) 1-0.05 % cream Topical, 2 times daily    Incontinence Supplies (Wings Incontinence Pants S/M) MISC Does not apply, 6 times daily     Lab Results   Component Value Date    WBC 12.36 (H) 11/22/2024    HGB 11.8 11/22/2024    HCT  34.6 (L) 11/22/2024     11/22/2024    SODIUM 136 11/22/2024    K 4.6 11/22/2024     11/22/2024    CO2 22 11/22/2024    BUN 33 (H) 11/22/2024    CREATININE 0.65 11/22/2024    GLUC 159 (H) 11/22/2024    AST 33 11/22/2024    ALT 23 11/22/2024    ALKPHOS 101 11/22/2024    TBILI 1.09 (H) 11/22/2024    ALB 4.0 11/22/2024    PROTIME 13.5 11/22/2024    PTT 27 11/22/2024    INR 0.96 11/22/2024     Vitals:    11/22/24 1630   BP: 141/96   Pulse: 105   Resp: 18   Temp: 97.6 °F (36.4 °C)   SpO2: 100%     EKG 11/22/24  Normal sinus rhythm  Nonspecific ST and T wave abnormality  Abnormal ECG  When compared with ECG of 21-Aug-2023 12:42,  Vent. rate has increased by  40 bpm  ST now depressed in Inferior leads  ST now depressed in Anterior leads  Nonspecific T wave abnormality now evident in Inferior leads  Confirmed by Johnathan Landers ([id number]) on 11/22/2024 4:17:46 PM    Physical Exam    Airway    Mallampati score: II         Dental       Cardiovascular  Rhythm: regular, Rate: normal    Pulmonary   Breath sounds clear to auscultation    Other Findings  post-pubertal.    Blood Consent/Refusal:  Consents to: Limited consent:   Blood Product Accepts   Whole Blood no   Red Blood Cells (pRBC) no   Platelets (Plt) no   Plasma (FFP) no   Cryoprecipitate no   Albumin yes   Clotting Factors yes   Immune Globulins yes       Patients Own Blood     Apheresis no   Auto-Transfusion no   Heart Lung Machine no       Recombinant (artificial) Factors   Clotting factor yes   Erythropoietin (EPO) yes   Other      Anesthesia Plan  ASA Score- 2 Emergent    Anesthesia Type- general with ASA Monitors.         Additional Monitors:     Airway Plan: ETT.           Plan Factors-Exercise tolerance (METS): >4 METS.Exercise comment: Family reports excellent ambulatory ability.    Chart reviewed. EKG reviewed.  Existing labs reviewed. Patient summary reviewed.    Patient is not a current smoker.              Induction- intravenous.    Postoperative  Plan- Plan for postoperative opioid use.     Perioperative Resuscitation Plan -    current level 3, reversal to level 1 periprocedurally, discussed with family and in agreement.    Informed Consent- Anesthetic plan and risks discussed with patient and healthcare power of .  I personally reviewed this patient with the CRNA. Discussed and agreed on the Anesthesia Plan with the CRNA..

## 2024-11-23 NOTE — CONSULTS
Consultation - Cardiology   Name: Suri Patel 84 y.o. female I MRN: [id number]  Unit/Bed#: OR POOL I Date of Admission: 11/22/2024   Date of Service: 11/23/2024 I Hospital Day: 1   Consults  Physician Requesting Evaluation: Mervin Hunter MD   Reason for Evaluation / Principal Problem: Preoperative risk stratification    Assessment & Plan  Femur fracture, right (HCC)  -Patient with recent fall where she sustained right hip fracture  - Orthopedic surgery plan for surgical intervention  - Given patient's dementia she is at moderate risk for the planned procedure  - Per her family she is very sensitive to medications, therefore we will hold off on any adjustments to her medications at this time  Elevated troponin  -Elevated with peak of 164, has since trended down  - Likely secondary to fall and femur fracture  - Discussed with Kerrie family, will hold off on any medical management at this time, they also expressed wishes not to proceed with any type of invasive procedure such as coronary angiography and patient's history of dementia and advanced age  Dementia (HCC)  -Management per primary team  Rash  -Being evaluated as an outpatient      History of Present Illness   Suri Patel is a 84 y.o. female with a past medical history of dementia and rash who presented to the emergency room after a fall for which she sustained a right femur fracture.  She was evaluated by orthopedic surgery who would like to take her to the emergency room.  Cardiology was consulted for further restratification.  Currently the patient does have dementia and is not able to verbalize.  Her family was present in the room during patient's interview and they report that she is generally healthy aside from dementia and has no prior cardiac issues. She has offered no complaints of chest pain or shortness of breath to them and is very active.    At this time they wish to pursue only conservative management of her elevated troponin and reports that  she is very sensitive to medications, therefore no medications will be started at this point in time.      Review of Systems   Unable to perform ROS: Dementia     I have reviewed the patient's PMH, PSH, Social History, Family History, Meds, and Allergies    Objective :  Temp:  [97.5 °F (36.4 °C)-99.3 °F (37.4 °C)] 99.3 °F (37.4 °C)  HR:  [] 91  BP: (108-141)/() 133/65  Resp:  [18-22] 18  SpO2:  [91 %-100 %] 95 %  O2 Device: None (Room air)  Nasal Cannula O2 Flow Rate (L/min):  [2 L/min] 2 L/min  Orthostatic Blood Pressures      Flowsheet Row Most Recent Value   Blood Pressure 133/65 filed at 11/23/2024 0848   Patient Position - Orthostatic VS Lying filed at 11/23/2024 0700          First Weight:  There were no vitals filed for this visit.    Physical Exam  Vitals and nursing note reviewed.   Constitutional:       General: She is not in acute distress.     Appearance: She is well-developed.   HENT:      Head: Normocephalic and atraumatic.   Eyes:      Conjunctiva/sclera: Conjunctivae normal.   Cardiovascular:      Rate and Rhythm: Normal rate and regular rhythm.      Heart sounds: No murmur heard.  Pulmonary:      Effort: Pulmonary effort is normal. No respiratory distress.      Breath sounds: Normal breath sounds.   Abdominal:      Palpations: Abdomen is soft.      Tenderness: There is no abdominal tenderness.   Musculoskeletal:         General: No swelling.      Cervical back: Neck supple.   Skin:     General: Skin is warm and dry.      Findings: Rash present.   Neurological:      Mental Status: She is alert. She is disoriented.   Psychiatric:         Mood and Affect: Mood normal.           Lab Results: I have reviewed the following results:  Results from last 7 days   Lab Units 11/23/24  0617 11/22/24  1248   WBC Thousand/uL 8.25 12.36*   HEMOGLOBIN g/dL 11.1* 11.8   HEMATOCRIT % 32.8* 34.6*   PLATELETS Thousands/uL 152 172     Results from last 7 days   Lab Units 11/23/24  0617 11/22/24  1248  "  POTASSIUM mmol/L 3.8 4.6   CHLORIDE mmol/L 109* 107   CO2 mmol/L 24 22   BUN mg/dL 27* 33*   CREATININE mg/dL 0.58* 0.65   CALCIUM mg/dL 8.9 9.8     Results from last 7 days   Lab Units 11/22/24  1248   INR  0.96   PTT seconds 27     No results found for: \"HGBA1C\"  No results found for: \"CKTOTAL\", \"CKMB\", \"CKMBINDEX\", \"TROPONINI\"    "

## 2024-11-23 NOTE — OP NOTE
OPERATIVE REPORT  PATIENT NAME: Suri Patel    :  1940  MRN: [id number]  Pt Location: [facility] OR ROOM 05    SURGERY DATE: 2024    Surgeons and Role:     * Kang Castorena, DO - Primary     * Nathanael Alcantara PA-C - Assisting    Preop Diagnosis:  Closed displaced intertrochanteric fracture of right femur, initial encounter ([facility]) [S72.141A]    Post-Op Diagnosis Codes:     * Closed displaced intertrochanteric fracture of right femur, initial encounter ([facility]) [S72.141A]    Procedure(s):  Right - OPEN REDUCTION INTERAL FIXATION INTERTROCHANTERIC FEMUR FRACTURE WITH IM NAIL    Specimen(s):  * No specimens in log *    Estimated Blood Loss:   50 mL    Drains:  External Urinary Catheter (Active)   Output (mL) 300 mL 24 0500   Number of days: 1       Anesthesia Type:   General    Operative Indications:  Closed displaced intertrochanteric fracture of right femur, initial encounter ([facility]) [S72.141A]      Operative Findings:  Displaced right intertrochanteric femur fracture      Complications:   None    Procedure and Technique:    Antibiotics: 2 g Ancef  Implants: Synthes  x 10 mm nail, 95 mm blade, 36 mm distal interlocking screw    The patient was seen in the preoperative holding area and the appropriate site of surgery and procedure were confirmed and marked. Upon bringing the patient back to the operating room general anesthesia was applied.  Both feet were well-padded and secured in traction boots for Wickett table.  The patient was placed supine on the Wickett table with the perineal post in place.  The boots were appropriately fixed to the limbs of the Wickett table.  The well leg was positioned in a extended and gently a deducted position.   Reduction maneuver was applied to the surgical leg with in-line traction, adduction, and internal rotation. Appropriate reduction of the fracture were confirmed on fluoroscopy.    The right lower extremity was prepped and draped in the usual sterile fashion. A preoperative  time-out was performed to once again confirm the site of surgery and procedure. A 10 blade was used to make an incision through skin proximal to the tip of the greater trochanter. Sharp dissection was then carried down through the fascia. The tip of the greater trochanter was bluntly palpated. A starting pin was then inserted by hand onto the tip of the greater trochanter. The position was confirmed under fluoroscopy. A guide pin was inserted through the tip of the greater trochanter, distal to the lesser trochanter. Positioning of the guide pin was confirmed on both AP and lateral fluoroscopic images. An opening Reamer was used over the guide pin and reamed to the level of the lesser trochanter.  At this time the appropriate size nail of 170 x 10 mm was inserted across the fracture site and down the medullary canal. Appropriate positioning was confirmed on fluoroscopy.    Next the guide arm was used to place the appropriate helical blade. Incision was made through skin and fascia and the guide was placed down to bone. A drill tip guidewire was inserted across the fracture and into the femoral head. Appropriate position of the guide pin was confirmed on AP and lateral views. The appropriate size helical blade was measured to be 95 mm. This was then inserted over the guide pin after reaming the near cortex and drilling the appropriate length.  Setscrew was then tightened.  Next attention was turned to the distal locking screw.  Incision was made corresponding to distal screw drill guide.  Guide was placed about to bone.  Bicortical drilling was performed.  The appropriate screw was measured to be 36 mm. The screw was inserted and confirmed to be the appropriate length on both AP and lateral views. The guide was disengaged from the nail and all incisions were copiously irrigated and closed in a layered fashion. Sterile dressings were applied. The patient was transferred from the fracture table to the stretcher and to  the PACU without issue. The patient tolerated the procedure well.       I was present for the entire procedure. A qualified resident physician was not available, and A physician assistant, Nathanael Alcantara PA-C was required during the procedure for patient positioning, tissue retraction and handling, and suturing.    Postoperatively the patient be weightbearing as tolerated extremity.  Recommend Lovenox 40 mg daily for DVT prophylaxis for 4 weeks postoperatively.  We will obtain morning laboratory testing.  Patient should receive Ancef 2 g every 8 hours x 2 doses postoperatively.  Patient to follow-up in the office in 2 weeks for staple removal and new x-rays of right femur.    Patient Disposition:  PACU              SIGNATURE: Kang Castorena DO  DATE: November 23, 2024  TIME: 10:41 AM

## 2024-11-23 NOTE — DISCHARGE INSTR - AVS FIRST PAGE
Discharge Instructions - Orthopedics  Suri Patel 84 y.o. female MRN: [id number]  Unit/Bed#: [facility] OR MAIN    Weight Bearing Status:                                           Patient to be weightbearing as tolerated right lower extremity    DVT prophylaxis:  Lovenox  for 4 weeks upon discharge    Pain:  Continue analgesics as directed    Dressing Instructions:   -Please keep clean, dry and intact until follow up   -DO NOT REMOVE DRESSINGS OVER SURGICAL WOUNDS  -DO NOT SHOWER UNTIL AUTHORIZED BY OPERATING PHYSICIAN   -DO NOT REMOVE STAPLES/SUTURES UNLESS AUTHORIZED BY OPERATING PHYSICIAN  -If dressing become saturated/wet contact the treating orthopedic surgeon prior to removing dressings    Appt Instructions:   If you do not have your appointment, please call the clinic at [phone]  Otherwise follow up as scheduled.    Contact the office sooner if you experience any increased numbness/tingling in the extremities.      Miscellaneous:   Follow-up in 2 weeks with Dr. Castorena      Please take Tylenol as needed for mild pain  Please start taking oxycodone 2.5 mL every 6-8 hourly as needed for moderate to severe pain  Please get a referral from PCP for hematology/oncology for evaluation of Scattered lucencies involving the visualized cervical spine, indeterminate. While these may represent areas of focal osteopenia, or hemangiomas, more aggressive lytic lesion such as metastatic disease or myeloma cannot be excluded.

## 2024-11-23 NOTE — ANESTHESIA POSTPROCEDURE EVALUATION
Post-Op Assessment Note    CV Status:  Stable  Pain Score: 0    Pain management: adequate       Mental Status:  Alert and awake   Hydration Status:  Euvolemic   PONV Controlled:  Controlled   Airway Patency:  Patent     Post Op Vitals Reviewed: Yes    No anethesia notable event occurred.    Staff: CRNA           Last Filed PACU Vitals:  Vitals Value Taken Time   Temp 97.6    Pulse 70 11/23/24 1042   /61 11/23/24 1043   Resp 15 11/23/24 1042   SpO2 100 % 11/23/24 1042   Vitals shown include unfiled device data.    Modified Tiffany:  No data recorded

## 2024-11-23 NOTE — ASSESSMENT & PLAN NOTE
Status post ORIF this morning  Lovenox for DVT prophylaxis for 4 weeks per Ortho  PT/OT  Pain control

## 2024-11-24 LAB
ANION GAP SERPL CALCULATED.3IONS-SCNC: 3 MMOL/L (ref 4–13)
BASOPHILS # BLD AUTO: 0.02 THOUSANDS/ΜL (ref 0–0.1)
BASOPHILS NFR BLD AUTO: 0 % (ref 0–1)
BUN SERPL-MCNC: 28 MG/DL (ref 5–25)
CALCIUM SERPL-MCNC: 9.1 MG/DL (ref 8.4–10.2)
CHLORIDE SERPL-SCNC: 108 MMOL/L (ref 96–108)
CO2 SERPL-SCNC: 28 MMOL/L (ref 21–32)
CREAT SERPL-MCNC: 0.64 MG/DL (ref 0.6–1.3)
EOSINOPHIL # BLD AUTO: 0.04 THOUSAND/ΜL (ref 0–0.61)
EOSINOPHIL NFR BLD AUTO: 1 % (ref 0–6)
ERYTHROCYTE [DISTWIDTH] IN BLOOD BY AUTOMATED COUNT: 15.5 % (ref 11.6–15.1)
GFR SERPL CREATININE-BSD FRML MDRD: 81 ML/MIN/1.73SQ M
GLUCOSE SERPL-MCNC: 110 MG/DL (ref 65–140)
HCT VFR BLD AUTO: 30.6 % (ref 34.8–46.1)
HGB BLD-MCNC: 10.1 G/DL (ref 11.5–15.4)
IMM GRANULOCYTES # BLD AUTO: 0.04 THOUSAND/UL (ref 0–0.2)
IMM GRANULOCYTES NFR BLD AUTO: 1 % (ref 0–2)
LYMPHOCYTES # BLD AUTO: 0.77 THOUSANDS/ΜL (ref 0.6–4.47)
LYMPHOCYTES NFR BLD AUTO: 10 % (ref 14–44)
MCH RBC QN AUTO: 34.6 PG (ref 26.8–34.3)
MCHC RBC AUTO-ENTMCNC: 33 G/DL (ref 31.4–37.4)
MCV RBC AUTO: 105 FL (ref 82–98)
MONOCYTES # BLD AUTO: 1.03 THOUSAND/ΜL (ref 0.17–1.22)
MONOCYTES NFR BLD AUTO: 13 % (ref 4–12)
NEUTROPHILS # BLD AUTO: 6.02 THOUSANDS/ΜL (ref 1.85–7.62)
NEUTS SEG NFR BLD AUTO: 75 % (ref 43–75)
NRBC BLD AUTO-RTO: 0 /100 WBCS
PLATELET # BLD AUTO: 148 THOUSANDS/UL (ref 149–390)
PMV BLD AUTO: 10.1 FL (ref 8.9–12.7)
POTASSIUM SERPL-SCNC: 4.6 MMOL/L (ref 3.5–5.3)
RBC # BLD AUTO: 2.92 MILLION/UL (ref 3.81–5.12)
SODIUM SERPL-SCNC: 139 MMOL/L (ref 135–147)
WBC # BLD AUTO: 7.92 THOUSAND/UL (ref 4.31–10.16)

## 2024-11-24 PROCEDURE — 80048 BASIC METABOLIC PNL TOTAL CA: CPT | Performed by: STUDENT IN AN ORGANIZED HEALTH CARE EDUCATION/TRAINING PROGRAM

## 2024-11-24 PROCEDURE — 99024 POSTOP FOLLOW-UP VISIT: CPT

## 2024-11-24 PROCEDURE — 99232 SBSQ HOSP IP/OBS MODERATE 35: CPT | Performed by: STUDENT IN AN ORGANIZED HEALTH CARE EDUCATION/TRAINING PROGRAM

## 2024-11-24 PROCEDURE — 97530 THERAPEUTIC ACTIVITIES: CPT

## 2024-11-24 PROCEDURE — 97163 PT EVAL HIGH COMPLEX 45 MIN: CPT

## 2024-11-24 PROCEDURE — 85025 COMPLETE CBC W/AUTO DIFF WBC: CPT | Performed by: STUDENT IN AN ORGANIZED HEALTH CARE EDUCATION/TRAINING PROGRAM

## 2024-11-24 RX ORDER — WATER 10 ML/10ML
INJECTION INTRAMUSCULAR; INTRAVENOUS; SUBCUTANEOUS
Status: COMPLETED
Start: 2024-11-24 | End: 2024-11-24

## 2024-11-24 RX ORDER — OLANZAPINE 10 MG/2ML
2.5 INJECTION, POWDER, FOR SOLUTION INTRAMUSCULAR ONCE
Status: COMPLETED | OUTPATIENT
Start: 2024-11-24 | End: 2024-11-24

## 2024-11-24 RX ADMIN — ACETAMINOPHEN 1000 MG: 10 INJECTION INTRAVENOUS at 23:21

## 2024-11-24 RX ADMIN — WATER 2.1 ML: 1 INJECTION INTRAMUSCULAR; INTRAVENOUS; SUBCUTANEOUS at 13:18

## 2024-11-24 RX ADMIN — MORPHINE SULFATE 2 MG: 2 INJECTION, SOLUTION INTRAMUSCULAR; INTRAVENOUS at 13:00

## 2024-11-24 RX ADMIN — ACETAMINOPHEN 1000 MG: 10 INJECTION INTRAVENOUS at 00:12

## 2024-11-24 RX ADMIN — SENNOSIDES 8.6 MG: 8.6 TABLET, FILM COATED ORAL at 08:53

## 2024-11-24 RX ADMIN — ENOXAPARIN SODIUM 30 MG: 30 INJECTION SUBCUTANEOUS at 21:17

## 2024-11-24 RX ADMIN — OLANZAPINE 2.5 MG: 10 INJECTION, POWDER, FOR SOLUTION INTRAMUSCULAR at 13:17

## 2024-11-24 RX ADMIN — ACETAMINOPHEN 1000 MG: 10 INJECTION INTRAVENOUS at 05:04

## 2024-11-24 RX ADMIN — MORPHINE SULFATE 2 MG: 2 INJECTION, SOLUTION INTRAMUSCULAR; INTRAVENOUS at 01:14

## 2024-11-24 RX ADMIN — MORPHINE SULFATE 2 MG: 2 INJECTION, SOLUTION INTRAMUSCULAR; INTRAVENOUS at 18:51

## 2024-11-24 RX ADMIN — ACETAMINOPHEN 1000 MG: 10 INJECTION INTRAVENOUS at 18:21

## 2024-11-24 RX ADMIN — CEFAZOLIN SODIUM 2000 MG: 2 SOLUTION INTRAVENOUS at 01:27

## 2024-11-24 NOTE — UTILIZATION REVIEW
NOTIFICATION OF INPATIENT ADMISSION   AUTHORIZATION REQUEST   SERVICING FACILITY:   [place] [address]  Tax ID: [id number]  NPI: [id number] ATTENDING PROVIDER:  Attending Name and NPI#: Mervin Hunter Md [[id number]]  Address: [address] [place] [address]  Phone: [phone]     ADMISSION INFORMATION:  Place of Service: Inpatient [facility]  Place of Service Code: 21  Inpatient Admission Date/Time: 11/22/24  2:02 PM  Discharge Date/Time: No discharge date for patient encounter.  Admitting Diagnosis Code/Description:  Rash [R21]  Femur fracture (HCC) [S72.90XA]  Hip injury [S79.919A]     UTILIZATION REVIEW CONTACT:  Lay Olivarez Utilization   Network Utilization Review Department  Phone: [phone]  Fax [phone]  Email: [email or web address]  Contact for approvals/pending authorizations, clinical reviews, and discharge.     PHYSICIAN ADVISORY SERVICES:  Medical Necessity Denial & Rqrk-fd-Rcme Review  Phone: [phone]  Fax: [phone]  Email: [email or web address]     DISCHARGE SUPPORT TEAM:  For Patients Discharge Needs & Updates  Phone: [phone] opt. 2 Fax: [phone]  Email: [email or web address]

## 2024-11-24 NOTE — ASSESSMENT & PLAN NOTE
Status post insertion of right intramedullary femoral nail with Dr. Castorena (POD 1)  Patient to be weightbearing as tolerated right lower extremity  Range of motion as tolerated right lower extremity  Current hemoglobin at 10.1, remaining stable  Patient refuses blood products  PT/OT as tolerated  Incentive spirometry  Pain control per primary team  DVT prophylaxis per primary team while in house, recommend Lovenox x 4 weeks upon discharge  All other medical comorbidities to manage per primary team  Patient will need follow-up with Dr. Castorena in 2 weeks upon discharge

## 2024-11-24 NOTE — PLAN OF CARE
Problem: INFECTION - ADULT  Goal: Absence or prevention of progression during hospitalization  Description: INTERVENTIONS:  - Assess and monitor for signs and symptoms of infection  - Monitor lab/diagnostic results  - Monitor all insertion sites, i.e. indwelling lines, tubes, and drains  - Monitor endotracheal if appropriate and nasal secretions for changes in amount and color  - Eugene appropriate cooling/warming therapies per order  - Administer medications as ordered  - Instruct and encourage patient and family to use good hand hygiene technique  - Identify and instruct in appropriate isolation precautions for identified infection/condition  Outcome: Progressing

## 2024-11-24 NOTE — CASE MANAGEMENT
Case Management Assessment & Discharge Planning Note    Patient name Cheikh Patel  Location 2 [facility] 254/2 E 254-01 MRN [id number]  : 1940 Date 2024       Current Admission Date: 2024  Current Admission Diagnosis:Femur fracture, right (HCC)   Patient Active Problem List    Diagnosis Date Noted Date Diagnosed    Elevated troponin 2024     Femur fracture, right (HCC) 2024     Abnormal results of thyroid function studies 2024     Rash 06/10/2024     Body mass index (BMI) of 19 or less in adult 2022     Dementia (HCC)      Refusal of blood product 2021     Chronic venous hypertension (idiopathic) with ulcer of right lower extremity (HCC) 2021       LOS (days): 2  Geometric Mean LOS (GMLOS) (days):   Days to GMLOS:     OBJECTIVE:    Risk of Unplanned Readmission Score: 10.98      Current admission status: Inpatient     [facility]erred Pharmacy:   [id number] [place] [facility] [address] [place] [address] [address] [facility] [address]  Phone: [phone] Fax: [phone]    Primary Care Provider: Aline Vincent DO    Primary Insurance: MEDICARE  Secondary Insurance: [facility]    ASSESSMENT:  Active Health Care Proxies       Radha Valverde Health Care Agent - Relative   Primary Phone: [phone] (Mobile)                 Advance Directives  Does patient have a Health Care POA?: Yes  Does patient have Advance Directives?: Yes  Advance Directives: Power of  for health care, Power of  for finance  Primary Contact: Radha Valverde (family/cousin) [phone]    Readmission Root Cause  30 Day Readmission: No    Patient Information  Admitted from:: Home  Mental Status: Confused, Alert  During Assessment patient was accompanied by: Other-Comment (Shelby Garcia (family/cousins))  Assessment information provided by:: Other - please comment (Shelby Garcia (family/cousins))  Primary Caregiver:  Family  Caregiver's Name:: Shelby Valverde  Caregiver's Relationship to Patient:: Family Member  Caregiver's Telephone Number:: [phone]  Support Systems: Family members  County of Residence: [place]  What city do you live in?: [place]  Home entry access options. Select all that apply.: Stairs  Number of steps to enter home.: 4  Type of Current Residence: 2 story home (duplex - family lives next door & in other homes [place] the farm.)  Upon entering residence, is there a bedroom on the main floor (no further steps)?: Yes (2nd floor is blocked off for patient safety)  Upon entering residence, is there a bathroom on the main floor (no further steps)?: Yes  Living Arrangements: Lives Alone (Lives alone, however cameras are used & family is present on property.)  Is patient a ?: No    Activities of Daily Living Prior to Admission  Functional Status: Independent  Completes ADLs independently?: No  Level of ADL dependence: Assistance  Ambulates independently?: Yes  Does patient use assisted devices?: No  Does patient currently own DME?: No  Does patient have a history of Outpatient Therapy (PT/OT)?: No  Does the patient have a history of Short-Term Rehab?: No  Does patient have a history of HHC?: No  Does patient currently have HHC?: No    Patient Information Continued  Does patient have prescription coverage?: Yes  Does patient receive dialysis treatments?: No  Does patient have a history of substance abuse?: No  Does patient have a history of Mental Health Diagnosis?: No    Means of Transportation  Means of Transport to Appts:: Family transport          DISCHARGE DETAILS:    Discharge planning discussed with:: Radha Ryan (family) at bedside.  Freedom of Choice: Yes  Comments - Freedom of Choice: FOC maintained - CM introduced self and role.  Reviewed Level II rec from PT/OT.  Family in agreement that STR placement is needed.  Patient has 'late stage' dementia per family, and was present, but unable to  participate in discussion.  Patient lives alone in her unit, [place], surrounded by family.  Ramon lives next door in same duplex, Perry (primary CG) and Radha (POA) also both live within walking distance.  Patient requires ADL assistance at baseline.  Family has gone to great lengths to ensure patient's safety while maintaining her independence at home.  (2nd floor is blocked off to reduce risk of falls, no access to stove, gated in yard, etc.)  Preference is for patient to return home upon d/c from [facility].  Facility preference is [facility] due to proximity to home.  Patient does wander at home but family reports no bx concerns.  CM contacted family/caregiver?: Yes  Were Treatment Team discharge recommendations reviewed with patient/caregiver?: Yes  Did patient/caregiver verbalize understanding of patient care needs?: N/A- going to facility  Were patient/caregiver advised of the risks associated with not following Treatment Team discharge recommendations?: Yes    Contacts  Patient Contacts: aRdha Amador  Relationship to Patient:: Family  Contact Method: In Person  Reason/Outcome: Continuity of Care, Discharge Planning, Referral    Requested Home Health Care         Is the patient interested in HHC at discharge?: No    DME Referral Provided  Referral made for DME?: No    Other Referral/Resources/Interventions Provided:  Interventions: Short Term Rehab  Referral Comments: [facility] referral opened in [facility].    Treatment Team Recommendation: Short Term Rehab  Discharge Destination Plan:: Short Term Rehab  Transport at Discharge : [facility]

## 2024-11-24 NOTE — PROGRESS NOTES
Progress Note - Hospitalist   Name: Suri Patel 84 y.o. female I MRN: [id number]  Unit/Bed#: 2 E 254-01 I Date of Admission: 11/22/2024   Date of Service: 11/24/2024 I Hospital Day: 2    Assessment & Plan  Femur fracture, right (HCC)  Status post ORIF postop day 2  Lovenox for DVT prophylaxis for 4 weeks per Ortho  PT/OT-attempted but patient very hesitant to participate, will likely need rehab  Pain control  Refusal of blood product  Healthcare proxy states that patient does not want blood even in emergency situations, have paperwork with her signature  Dementia (HCC)  At baseline  S/p zyprexa for behavior disturbance  Rash  Derm consulted, photos in epic  Recommended medications are not available inpatient, she will have outpatient follow-up  Elevated troponin  Cardio consulted, likely stress from fracture  No further wound workup indicated or desired per family    VTE Pharmacologic Prophylaxis: VTE Score: 10 Moderate Risk (Score 3-4) - Pharmacological DVT Prophylaxis Ordered: enoxaparin (Lovenox).    Mobility:   Basic Mobility Inpatient Raw Score: 11  -HLM Goal: 4: Move to chair/commode  JH-HLM Achieved: 2: Bed activities/Dependent transfer  JH-HLM Goal achieved. Continue to encourage appropriate mobility.    Patient Centered Rounds: I performed bedside rounds with nursing staff today.   Discussions with Specialists or Other Care Team Provider: None    Education and Discussions with Family / Patient: Attempted to update  (caregiver) via phone. Unable to contact.    Current Length of Stay: 2 day(s)  Current Patient Status: Inpatient   Certification Statement: The patient will continue to require additional inpatient hospital stay due to status post hip fracture  Discharge Plan: Anticipate discharge in 24-48 hrs to rehab facility.    Code Status: Level 3 - DNAR and DNI    Subjective   Pleasant, sitting at the side of the bed.  Nursing in the room, attempting to reorient and get patient back in  bed.  Patient with some hitting, confused.    Objective :  Temp:  [98.5 °F (36.9 °C)-98.6 °F (37 °C)] 98.6 °F (37 °C)  HR:  [] 115  BP: ()/(53-74) 97/67  Resp:  [16] 16  SpO2:  [96 %-100 %] 98 %  O2 Device: Nasal cannula  Nasal Cannula O2 Flow Rate (L/min):  [2 L/min] 2 L/min    Body mass index is 19.2 kg/m².     Input and Output Summary (last 24 hours):     Intake/Output Summary (Last 24 hours) at 11/24/2024 1307  Last data filed at 11/24/2024 0127  Gross per 24 hour   Intake 680 ml   Output 425 ml   Net 255 ml       Physical Exam  Constitutional:       General: She is not in acute distress.  Cardiovascular:      Rate and Rhythm: Normal rate and regular rhythm.      Heart sounds: Normal heart sounds.   Pulmonary:      Effort: Pulmonary effort is normal.      Breath sounds: Normal breath sounds.   Abdominal:      General: There is no distension.      Palpations: Abdomen is soft.      Tenderness: There is no abdominal tenderness.   Skin:     Comments: Erythema much improved from admission   Neurological:      Mental Status: She is alert. She is disoriented.       Lines/Drains:  Lines/Drains/Airways       Active Status       Name Placement date Placement time Site Days    External Urinary Catheter 11/22/24  1900  -- 1                      Lab Results: I have reviewed the following results:   Results from last 7 days   Lab Units 11/24/24  0620   WBC Thousand/uL 7.92   HEMOGLOBIN g/dL 10.1*   HEMATOCRIT % 30.6*   PLATELETS Thousands/uL 148*   SEGS PCT % 75   LYMPHO PCT % 10*   MONO PCT % 13*   EOS PCT % 1     Results from last 7 days   Lab Units 11/24/24  0620 11/23/24  0617 11/22/24  1248   SODIUM mmol/L 139   < > 136   POTASSIUM mmol/L 4.6   < > 4.6   CHLORIDE mmol/L 108   < > 107   CO2 mmol/L 28   < > 22   BUN mg/dL 28*   < > 33*   CREATININE mg/dL 0.64   < > 0.65   ANION GAP mmol/L 3*   < > 7   CALCIUM mg/dL 9.1   < > 9.8   ALBUMIN g/dL  --   --  4.0   TOTAL BILIRUBIN mg/dL  --   --  1.09*   ALK PHOS U/L   --   --  101   ALT U/L  --   --  23   AST U/L  --   --  33   GLUCOSE RANDOM mg/dL 110   < > 159*    < > = values in this interval not displayed.     Results from last 7 days   Lab Units 11/22/24  1248   INR  0.96     Results from last 7 days   Lab Units 11/22/24  1256   POC GLUCOSE mg/dl 128               Recent Cultures (last 7 days):         Last 24 Hours Medication List:     Current Facility-Administered Medications:     acetaminophen (Ofirmev) injection 1,000 mg, Q6H MISSY, Last Rate: 1,000 mg (11/24/24 0504)    ALPRAZolam (XANAX) tablet 0.25 mg, Daily PRN    enoxaparin (LOVENOX) subcutaneous injection 30 mg, Q24H MISSY    magnesium hydroxide (MILK OF MAGNESIA) oral suspension 30 mL, Daily PRN    morphine injection 2 mg, Q2H PRN    senna (SENOKOT) tablet 8.6 mg, Daily    Administrative Statements   Today, Patient Was Seen By: Mervin Hunter MD      **Please Note: This note may have been constructed using a voice recognition system.**

## 2024-11-24 NOTE — ASSESSMENT & PLAN NOTE
Status post ORIF postop day 2  Lovenox for DVT prophylaxis for 4 weeks per Ortho  PT/OT-attempted but patient very hesitant to participate, will likely need rehab  Pain control

## 2024-11-24 NOTE — CONSULTS
DERMATOLOGY:  CONSULTATION   Suri Patel 84 y.o. female MRN: [id number]  Unit/Bed#: 2 E 254-01 Encounter: [id number]        Attending and resident physician present VIRTUALLY. Patient photos reviewed. Patient in hospital setting. Assessment can only be provided based on clinical images received and symptoms conveyed.       Assessment/Recommendations   Based on a thorough discussion of this condition and the management approach to it (including a comprehensive discussion of the known risks, side effects and potential benefits of treatment), the patient (family) agrees to implement the following specific plan:    - Given patient's presentation and photographs reviewed, rash suspicious for tinea corporis. This will require an outpatient visit with KOH prep to confirm.  - Start terbinafine cream BID to the affected areas on the lower extremities for 2 weeks   - Start urea cream BID to feet for hyperkeratosis.   - We will arrange an outpatient appointment for the patient.   - If rash significantly worsens or systemic symptoms develop, return to nearest emergency room.        Please set up discharge follow up by calling our office: [phone] ([phone])     Thank you for involving me in the care of your patient. Please call with questions, change in clinical status or if tests recommended above are abnormal.     Discussed with the primary service.      History:     HISTORY OF PRESENT ILLNESS:    Reason for Consult: rash     HPI: Suri Patel is a 84 y.o. year old female who was admitted for right femoral fracture s/p fall. Dermatology was consulted for rash that was present for weeks-months. Per primary team, patient's family reports rash stars as red spot that progresses to scaly spots. 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 Patient was prescribed clobetasol at the time, and unclear if patient was still applying.

## 2024-11-24 NOTE — PLAN OF CARE
Problem: PHYSICAL THERAPY ADULT  Goal: Performs mobility at highest level of function for planned discharge setting.  See evaluation for individualized goals.  Description: Treatment/Interventions: Functional transfer training, LE strengthening/ROM, Therapeutic exercise, Endurance training, Patient/family training, Equipment eval/education, Bed mobility, Continued evaluation, Spoke to nursing, Spoke to case management          See flowsheet documentation for full assessment, interventions and recommendations.  11/24/2024 1338 by Melissa Barajas PT  Note: Prognosis: Guarded  Problem List: Decreased strength, Decreased range of motion, Decreased endurance, Impaired balance, Decreased mobility, Decreased cognition, Decreased safety awareness, Pain, Orthopedic restrictions  Assessment: Pt is 84 y.o. female seen for PT evaluation on 11/24/2024 s/p admit to [facility] on 11/22/2024 w/ Femur fracture, right (HCC). POD 1 insertion of right intramedullary femoral nail. PT was consulted to assess pt's functional mobility and d/c needs. Order placed for PT eval and tx. Please reference PLOF and home s/u information above, pt poor historian. At time of eval, pt requiring max assist x2 for bed mobility, transfers. Upon evaluation, pt presenting with impaired functional mobility d/t decreased strength, decreased ROM, decreased endurance, impaired balance, decreased mobility, decreased cognition, decreased safety awareness, orthopedic restrictions of WBAT R LE, pain, and activity intolerance. Pertinent PMHx and current co-morbidities affecting pt's physical performance at time of assessment include: dementia, rash, elevated troponin, chronic venous hypertension, abnormal results of thyroid function studies. Personal factors affecting pt at time of eval include: stairs to enter home, inability to ambulate household distances, inability to navigate level surfaces w/o external assistance, unable to perform dynamic tasks in  community, decreased cognition, positive fall history, decreased initiation and engagement, inability to perform IADLs, inability to perform ADLs, and inability to live alone. The following objective measures performed on IE also reveal limitations: Barthel Index: 10/100, Modified [place]: 5 (severe disability), and AM-PAC 6-Clicks: 6/24. Pt's clinical presentation is currently unstable/unpredictable seen in pt's presentation of advanced age, abnormal lab value(s), need for input for task focus and mobility technique, and ongoing medical assessment. Overall, pt's rehab potential and prognosis to return to PLOF is guarded as impacted by objective findings, warranting pt to receive further skilled PT interventions to address identified impairments, activity limitation(s), and participation restriction(s). Pt to benefit from continued PT tx to address deficits as defined above and maximize level of functional independent mobility. From PT/mobility standpoint, recommend level 2, moderate resource intensity in order to facilitate return to PLOF.  Barriers to Discharge: Inaccessible home environment     Rehab Resource Intensity Level, PT: II (Moderate Resource Intensity)    See flowsheet documentation for full assessment.     11/24/2024 1338 by Melissa Barajas PT  Note: Prognosis: Guarded  Problem List: Decreased strength, Decreased range of motion, Decreased endurance, Impaired balance, Decreased mobility, Decreased cognition, Decreased safety awareness, Pain, Orthopedic restrictions  Assessment: Pt is 84 y.o. female seen for PT evaluation on 11/24/2024 s/p admit to [facility] on 11/22/2024 w/ Femur fracture, right (HCC). POD 1 insertion of right intramedullary femoral nail. PT was consulted to assess pt's functional mobility and d/c needs. Order placed for PT eval and tx. Please reference PLOF and home s/u information above, pt poor historian. At time of eval, pt requiring max assist x2 for bed mobility, transfers.  Upon evaluation, pt presenting with impaired functional mobility d/t decreased strength, decreased ROM, decreased endurance, impaired balance, decreased mobility, decreased cognition, decreased safety awareness, orthopedic restrictions of WBAT R LE, pain, and activity intolerance. Pertinent PMHx and current co-morbidities affecting pt's physical performance at time of assessment include: dementia, rash, elevated troponin, chronic venous hypertension, abnormal results of thyroid function studies. Personal factors affecting pt at time of eval include: stairs to enter home, inability to ambulate household distances, inability to navigate level surfaces w/o external assistance, unable to perform dynamic tasks in community, decreased cognition, positive fall history, decreased initiation and engagement, inability to perform IADLs, inability to perform ADLs, and inability to live alone. The following objective measures performed on IE also reveal limitations: Barthel Index: 10/100, Modified [place]: 5 (severe disability), and AM-PAC 6-Clicks: 6/24. Pt's clinical presentation is currently unstable/unpredictable seen in pt's presentation of advanced age, abnormal lab value(s), need for input for task focus and mobility technique, and ongoing medical assessment. Overall, pt's rehab potential and prognosis to return to PLOF is guarded as impacted by objective findings, warranting pt to receive further skilled PT interventions to address identified impairments, activity limitation(s), and participation restriction(s). Pt to benefit from continued PT tx to address deficits as defined above and maximize level of functional independent mobility. From PT/mobility standpoint, recommend level 2, moderate resource intensity in order to facilitate return to PLOF.  Barriers to Discharge: Inaccessible home environment     Rehab Resource Intensity Level, PT: II (Moderate Resource Intensity)    See flowsheet documentation for full  assessment.

## 2024-11-24 NOTE — ASSESSMENT & PLAN NOTE
Derm consulted, photos in epic  Recommended medications are not available inpatient, she will have outpatient follow-up

## 2024-11-24 NOTE — PLAN OF CARE
Problem: Prexisting or High Potential for Compromised Skin Integrity  Goal: Skin integrity is maintained or improved  Description: INTERVENTIONS:  - Identify patients at risk for skin breakdown  - Assess and monitor skin integrity  - Assess and monitor nutrition and hydration status  - Monitor labs   - Assess for incontinence   - Turn and reposition patient  - Assist with mobility/ambulation  - Relieve pressure over bony prominences  - Avoid friction and shearing  - Provide appropriate hygiene as needed including keeping skin clean and dry  - Evaluate need for skin moisturizer/barrier cream  - Collaborate with interdisciplinary team   - Patient/family teaching  - Consider wound care consult   Outcome: Progressing     Problem: PAIN - ADULT  Goal: Verbalizes/displays adequate comfort level or baseline comfort level  Description: Interventions:  - Encourage patient to monitor pain and request assistance  - Assess pain using appropriate pain scale  - Administer analgesics based on type and severity of pain and evaluate response  - Implement non-pharmacological measures as appropriate and evaluate response  - Consider cultural and social influences on pain and pain management  - Notify physician/advanced practitioner if interventions unsuccessful or patient reports new pain  Outcome: Progressing     Problem: INFECTION - ADULT  Goal: Absence or prevention of progression during hospitalization  Description: INTERVENTIONS:  - Assess and monitor for signs and symptoms of infection  - Monitor lab/diagnostic results  - Monitor all insertion sites, i.e. indwelling lines, tubes, and drains  - Monitor endotracheal if appropriate and nasal secretions for changes in amount and color  - [place] appropriate cooling/warming therapies per order  - Administer medications as ordered  - Instruct and encourage patient and family to use good hand hygiene technique  - Identify and instruct in appropriate isolation precautions for  identified infection/condition  Outcome: Progressing  Goal: Absence of fever/infection during neutropenic period  Description: INTERVENTIONS:  - Monitor WBC    Outcome: Progressing     Problem: SAFETY ADULT  Goal: Patient will remain free of falls  Description: INTERVENTIONS:  - Educate patient/family on patient safety including physical limitations  - Instruct patient to call for assistance with activity   - Consult OT/PT to assist with strengthening/mobility   - Keep Call bell within reach  - Keep bed low and locked with side rails adjusted as appropriate  - Keep care items and personal belongings within reach  - Initiate and maintain comfort rounds  - Make Fall Risk Sign visible to staff  - Offer Toileting every  Hours, in advance of need  - Initiate/Maintain alarm  - Obtain necessary fall risk management equipment:   - Apply yellow socks and bracelet for high fall risk patients  - Consider moving patient to room near nurses station  Outcome: Progressing  Goal: Maintain or return to baseline ADL function  Description: INTERVENTIONS:  -  Assess patient's ability to carry out ADLs; assess patient's baseline for ADL function and identify physical deficits which impact ability to perform ADLs (bathing, care of mouth/teeth, toileting, grooming, dressing, etc.)  - Assess/evaluate cause of self-care deficits   - Assess range of motion  - Assess patient's mobility; develop plan if impaired  - Assess patient's need for assistive devices and provide as appropriate  - Encourage maximum independence but intervene and supervise when necessary  - Involve family in performance of ADLs  - Assess for home care needs following discharge   - Consider OT consult to assist with ADL evaluation and planning for discharge  - Provide patient education as appropriate  Outcome: Progressing  Goal: Maintains/Returns to pre admission functional level  Description: INTERVENTIONS:  - Perform AM-PAC 6 Click Basic Mobility/ Daily Activity  assessment daily.  - Set and communicate daily mobility goal to care team and patient/family/caregiver.   - Collaborate with rehabilitation services on mobility goals if consulted  - Perform Range of Motion  times a day.  - Reposition patient every  hours.  - Dangle patient  times a day  - Stand patient  times a day  - Ambulate patient  times a day  - Out of bed to chair  times a day   - Out of bed for meals times a day  - Out of bed for toileting  - Record patient progress and toleration of activity level   Outcome: Progressing     Problem: DISCHARGE PLANNING  Goal: Discharge to home or other facility with appropriate resources  Description: INTERVENTIONS:  - Identify barriers to discharge w/patient and caregiver  - Arrange for needed discharge resources and transportation as appropriate  - Identify discharge learning needs (meds, wound care, etc.)  - Arrange for interpretive services to assist at discharge as needed  - Refer to Case Management Department for coordinating discharge planning if the patient needs post-hospital services based on physician/advanced practitioner order or complex needs related to functional status, cognitive ability, or social support system  Outcome: Progressing     Problem: Knowledge Deficit  Goal: Patient/family/caregiver demonstrates understanding of disease process, treatment plan, medications, and discharge instructions  Description: Complete learning assessment and assess knowledge base.  Interventions:  - Provide teaching at level of understanding  - Provide teaching via preferred learning methods  Outcome: Progressing

## 2024-11-24 NOTE — UTILIZATION REVIEW
Initial Clinical Review    Admission: Date/Time/Statement:   Admission Orders (From admission, onward)       Ordered        11/22/24 1402  INPATIENT ADMISSION  Once                          Orders Placed This Encounter   Procedures    INPATIENT ADMISSION     Standing Status:   Standing     Number of Occurrences:   1     Level of Care:   Med Surg [16]     Estimated length of stay:   More than 2 Midnights     Certification:   I certify that inpatient services are medically necessary for this patient for a duration of greater than two midnights. See H&P and MD Progress Notes for additional information about the patient's course of treatment.     ED Arrival Information       Expected   -    Arrival   11/22/2024 12:21    Acuity   Urgent              Means of arrival   Ambulance    Escorted by   [facility]    Service   Hospitalist    Admission type   Emergency              Arrival complaint   -             Chief Complaint   Patient presents with    Fall     Baseline dementia, patient reportedly had unwitnessed fall at home onto hardwood floor, found on right side complaining of right hip pain. Presents confused at their baseline per family, 1+ pedal pulses bilaterally (-) thinners or ASA       Initial Presentation: 84 y.o. female with a PMH of dementia presents to the ED s/p unwitnessed  fall at home this morning. Patient was found down by family at bedside on her right side this morning around 7 AM. They state that they do watch her with cameras and therefore she was not on the ground for any considerable length of time. At baseline she ambulates without assistance. . Patient is currently at her mental baseline and is mostly nonverbal.  Family reports patient has had rash for months. ED imaging revealed R femur fracture.  ED labs showed WBC of 12, slight troponin elevation.  Exam:  Alert. Disoriented. RLE shortened, externally rotated.  Rash UE small papular, erythematic. LE large, crusted erythematous  lesions.     11/22 Inpatient admission for evaluation and treatment of R femur fracture, rash:  NPO, Ortho consulted. Healthcare proxy is at bedside states that patient does not want blood even in emergency situations, have paperwork with her signature. Consult Dermatology.  Anticipated Length of Stay: Patient will be admitted on an inpatient basis with an anticipated length of stay of greater than 2 midnights secondary to hip fracture.     11/22 Orthopedics consult:  Plan for OR tomorrow for intramedullary fixation right hip. SLIM clearance to be obtained. Refusal of blood products.  NPO 0001 11/23/24,  pain control per primary  Exam:  Right lower extremity shortened and externally rotated. No erythema or ecchymosis to the hip. Mild discomfort with palpation of the greater trochanter. Discomfort with log roll. Fires EPL, FPL, FHL. Unable to assess sensation secondary to dementia. Palpable DP pulse.     11/23 Orthopedics:  Plan for OR today for intramedullary fixation right hip pending SLIM clearance. Cardiology consult:  Elevated troponin with peak of 164, has since trended down. Likely secondary to fall and femur fracture. Discussed with family, will hold off on any medical management at this time, they also expressed wishes not to proceed with any type of invasive procedure such as coronary angiography and patient's history of dementia and advanced age. Proceed with surgery, moderate cardiovascular risk.    11/23 Operative Report:      Procedure(s): Right - OPEN REDUCTION INTERAL FIXATION INTERTROCHANTERIC FEMUR FRACTURE WITH IM NAIL  Anesthesia Type: General  Operative Findings:   Displaced right intertrochanteric femur fracture    Postoperatively the patient be weightbearing as tolerated extremity. Recommend Lovenox 40 mg daily for DVT prophylaxis for 4 weeks postoperatively. We will obtain morning laboratory testing. Patient should receive Ancef 2 g every 8 hours x 2 doses postoperatively.      11/23  Dermatology consult:  Given patient's presentation and photographs reviewed, rash suspicious for tinea corporis. This will require an outpatient visit with KOH prep to confirm.  Start terbinafine cream BID to the affected areas on the lower extremities for 2 weeks   Start urea cream BID to feet for hyperkeratosis.  Will arrange an outpatient appointment for the patient.         ED Treatment-Medication Administration from 11/22/2024 1221 to 11/22/2024 1533         Date/Time Order Dose Route Action     11/22/2024 1329 sodium chloride 0.9 % bolus 1,000 mL 1,000 mL Intravenous New Bag     11/22/2024 1328 acetaminophen (Ofirmev) injection 1,000 mg 1,000 mg Intravenous New Bag     11/22/2024 1302 haloperidol lactate (HALDOL) injection 5 mg 5 mg Intramuscular Given     11/22/2024 1314 iohexol (OMNIPAQUE) 350 MG/ML injection (MULTI-DOSE) 100 mL 100 mL Intravenous Given     11/22/2024 1506 tranexamic acid (CYKLOKAPRON) 1000-0.7 MG/100ML-% injection 1,000 mg 1,000 mg Intravenous New Bag            Scheduled Medications:      acetaminophen, 1,000 mg, Intravenous, Q6H MISSY  enoxaparin, 30 mg, Subcutaneous, Q24H MISSY  senna, 1 tablet, Oral, Daily      acetaminophen (Ofirmev) injection 1,000 mg  Dose: 1,000 mg  Freq: Every 6 hours scheduled Route: IV  Last Dose: 1,000 mg (11/24/24 0504)  Start: 11/23/24 1200     acetaminophen (TYLENOL) tablet 975 mg x 1 dose  Dose: 975 mg  Freq: Every 8 hours scheduled Route: PO  Indications Comment: Around the clock pain.  Start: 11/22/24 2200 End: 11/23/24 1143      ceFAZolin (ANCEF) IVPB (premix in dextrose) 2,000 mg 50 mL x 2 doses  Dose: 2,000 mg  Freq: Every 8 hours Route: IV  Last Dose: 2,000 mg (11/24/24 0127)  Start: 11/23/24 1734 End: 11/24/24 0157    Continuous IV Infusions: None.      PRN Meds:      ALPRAZolam, 0.25 mg, Oral, Daily PRN  magnesium hydroxide, 30 mL, Oral, Daily PRN  morphine injection, 2 mg, Intravenous, Q2H PRN x 2 doses 11/23         ED Triage Vitals   Temperature Pulse  Respirations Blood Pressure SpO2 Pain Score   11/22/24 1225 11/22/24 1225 11/22/24 1225 11/22/24 1225 11/22/24 1225 11/22/24 2109   97.5 °F (36.4 °C) 83 19 135/64 100 % 9     Weight (last 2 days)       Date/Time Weight    11/23/24 1133 59 (130)            Vital Signs (last 3 days)       Date/Time Temp Pulse Resp BP MAP (mmHg) SpO2 Calculated FIO2 (%) - Nasal Cannula Nasal Cannula O2 Flow Rate (L/min) O2 Device [place] Coma Scale Score Pain    11/23/24 2215 98.6 °F (37 °C) 55 16 151/71 102 100 % -- -- None (Room air) -- --    11/23/24 1943 -- -- -- -- -- -- -- -- -- 14 No Pain    11/23/24 1907 -- 58 -- 106/53 76 99 % -- -- -- -- --    11/23/24 1747 -- 78 -- 142/69 95 97 % -- -- -- -- --    11/23/24 1626 -- -- -- -- -- -- -- -- -- -- 8    11/23/24 1345 -- 78 -- 143/62 90 96 % -- -- -- -- --    11/23/24 1245 -- 83 -- 142/65 94 96 % -- -- -- -- --    11/23/24 1230 -- 82 -- 143/75 102 95 % -- -- -- -- --    11/23/24 1215 -- 70 -- 145/71 100 97 % -- -- -- -- --    11/23/24 1200 -- 65 -- 138/67 95 95 % -- -- -- -- --    11/23/24 1145 -- 68 -- 145/64 95 94 % -- -- -- -- --    11/23/24 1133 97.3 °F (36.3 °C) 70 16 147/77 104 94 % -- -- None (Room air) -- --    11/23/24 1100 -- -- -- 155/65 93 -- -- -- -- 14 --    11/23/24 1045 -- -- -- -- -- -- -- -- None (Room air) 14 --    11/23/24 1044 97.7 °F (36.5 °C) -- -- -- -- -- -- -- None (Room air) 14 --    11/23/24 0848 99.3 °F (37.4 °C) 91 18 133/65 -- 95 % -- -- None (Room air) -- --    11/23/24 0800 -- -- -- -- -- -- -- -- -- 14 --    11/23/24 0700 -- 107 19 125/56 80 97 % -- -- None (Room air) -- --    11/23/24 0042 98.5 °F (36.9 °C) 106 -- 129/78 -- 97 % 28 2 L/min Nasal cannula -- --    11/23/24 0040 -- -- -- -- -- -- -- -- -- -- 9 11/22/24 2300 98 °F (36.7 °C) 106 -- 122/58 83 92 % -- -- None (Room air) -- --    11/22/24 2109 -- -- -- 119/80 88 -- -- -- -- -- 9 11/22/24 2057 -- -- -- -- -- -- -- -- -- 14 --    11/22/24 1900 98.3 °F (36.8 °C) 94 -- 108/54 78 93 % --  -- None (Room air) -- --    11/22/24 1630 97.6 °F (36.4 °C) 105 18 141/96 99 100 % -- -- None (Room air) -- --    11/22/24 1500 -- 115 22 141/100 113 97 % -- -- None (Room air) 14 --    11/22/24 1415 -- 104 20 -- -- 91 % -- -- -- -- --    11/22/24 1225 97.5 °F (36.4 °C) 83 19 135/64 -- 100 % -- -- None (Room air) 14 --              Pertinent Labs/Diagnostic Test Results:       Radiology:  XR chest 1 view portable   ED Interpretation by Ruth Ann Carrington DO (11/22 1324)   NAD      Final Interpretation by Felicia Kumar MD (11/22 1326)      No acute cardiopulmonary disease.            Workstation performed: [id number]         XR femur 2 views RIGHT   ED Interpretation by Ruth Ann Carrington DO (11/22 1324)   Impacted femoral neck fracture      Final Interpretation by Rito Robles MD (11/22 1328)      Acute fracture of the proximal right femur with moderate varus angulation at the fracture site which is along the intertrochanteric line         Computerized Assisted Algorithm (CAA) may have been used to analyze all applicable images.         Workstation performed: [id number]         CT head without contrast   Final Interpretation by Felicia Kumar MD (11/22 1335)      No acute intracranial abnormality. Mild chronic white matter microangiopathic changes.                  Workstation performed: [id number]         CT spine cervical without contrast   Final Interpretation by Felicia Kumar MD (11/22 1334)      No cervical spine fracture identified.      Chronic compression deformity involving the T4 vertebral body resulting in approximately 40 to 50% height loss.      Scattered lucencies involving the visualized cervical spine, indeterminate. While these may represent areas of focal osteopenia, or hemangiomas, more aggressive lytic lesion such as metastatic disease or myeloma cannot be excluded. Correlation with the    patient's clinical and past medical history is recommended, and recommend further evaluation with  cervical spine MRI without and with contrast on a nonemergent basis.      The study was marked in EPIC for immediate notification.            Workstation performed: [id number]         CT chest abdomen pelvis w contrast   Final Interpretation by Felicia Kumar MD (11/22 1862)      No consolidation pleural effusion or pneumothorax identified.      Constipation. No bowel wall thickening or bowel obstruction.      No intraperitoneal free air or ascites noted. No active intravasation of contrast noted.      Acute, comminuted intertrochanteric right femoral fracture with varus angulation.      Healing, subacute to chronic left sacral insufficiency fracture with sclerosis. Healed fracture involving the left lateral 10th rib.      Stable chronic compression deformities of the T4 vertebral body.      Pectus excavatum deformity again demonstrated.      The study was marked in EPIC for immediate notification.         Workstation performed: [id number]           Cardiology:      ECG 12 lead   Final Result by Johnathan Landers MD (11/22 8967)   Normal sinus rhythm   Nonspecific ST and T wave abnormality   Abnormal ECG   When compared with ECG of 21-Aug-2023 12:42,   Vent. rate has increased by  40 bpm   ST now depressed in Inferior leads   ST now depressed in Anterior leads   Nonspecific T wave abnormality now evident in Inferior leads   Confirmed by Johnathan Landers ([id number]) on 11/22/2024 4:17:46 PM                  Results from last 7 days   Lab Units 11/23/24  0617 11/22/24  1248   WBC Thousand/uL 8.25 12.36*   HEMOGLOBIN g/dL 11.1* 11.8   HEMATOCRIT % 32.8* 34.6*   PLATELETS Thousands/uL 152 172   TOTAL NEUT ABS Thousands/µL 6.67 10.65*         Results from last 7 days   Lab Units 11/23/24  0617 11/22/24  1248   SODIUM mmol/L 137 136   POTASSIUM mmol/L 3.8 4.6   CHLORIDE mmol/L 109* 107   CO2 mmol/L 24 22   ANION GAP mmol/L 4 7   BUN mg/dL 27* 33*   CREATININE mg/dL 0.58* 0.65   EGFR ml/min/1.73sq m 83 80   CALCIUM mg/dL 8.9 9.8    MAGNESIUM mg/dL  --  2.1     Results from last 7 days   Lab Units 11/22/24  1248   AST U/L 33   ALT U/L 23   ALK PHOS U/L 101   TOTAL PROTEIN g/dL 6.9   ALBUMIN g/dL 4.0   TOTAL BILIRUBIN mg/dL 1.09*     Results from last 7 days   Lab Units 11/22/24  1256   POC GLUCOSE mg/dl 128     Results from last 7 days   Lab Units 11/23/24  0617 11/22/24  1248   GLUCOSE RANDOM mg/dL 129 159*               Results from last 7 days   Lab Units 11/23/24  0857 11/22/24  1657 11/22/24  1248   HS TNI 0HR ng/L  --   --  6   HS TNI 2HR ng/L 85*  --   --    HSTNI D2 ng/L 79*  --   --    HS TNI 4HR ng/L  --  164*  --    HSTNI D4 ng/L  --  158*  --          Results from last 7 days   Lab Units 11/22/24  1248   PROTIME seconds 13.5   INR  0.96   PTT seconds 27             Past Medical History:   Diagnosis Date    Cellulitis of right lower extremity 6/21/2022    Dementia (HCC)     Memory loss 2018    Mild protein-calorie malnutrition (HCC)  7/28/2021     Present on Admission:   Refusal of blood product   Dementia (HCC)   Rash      Admitting Diagnosis: Rash [R21]  Femur fracture (HCC) [S72.90XA]  Hip injury [S79.919A]  Age/Sex: 84 y.o. female    Network Utilization Review Department  ATTENTION: Please call with any questions or concerns to [phone] and carefully listen to the prompts so that you are directed to the right person. All voicemails are confidential.   For Discharge needs, contact Care Management DC Support Team at [phone] opt. 2  Send all requests for admission clinical reviews, approved or denied determinations and any other requests to dedicated fax number below belonging to the campus where the patient is receiving treatment. List of dedicated fax numbers for the Facilities:  FACILITY NAME UR FAX NUMBER   ADMISSION DENIALS (Administrative/Medical Necessity) [phone]   DISCHARGE SUPPORT TEAM (NETWORK) [phone]   PARENT CHILD HEALTH (Maternity/NICU/Pediatrics) [phone]   [facility]  [place] [phone]   [facility] [phone]   [facility] [phone]   [facility] [phone]   [facility] [phone]   [facility] [phone]   [facility] [phone]   [facility] [phone]   [facility] [phone]   [facility] [phone]   [facility] [phone]   [facility] [phone]

## 2024-11-24 NOTE — PHYSICAL THERAPY NOTE
Physical Therapy Evaluation     Patient's Name: Suri Patel    Admitting Diagnosis  Rash [R21]  Femur fracture (HCC) [S72.90XA]  Hip injury [S79.919A]    Problem List  Patient Active Problem List   Diagnosis    Chronic venous hypertension (idiopathic) with ulcer of right lower extremity (HCC)    Refusal of blood product    Dementia (HCC)    Body mass index (BMI) of 19 or less in adult    Rash    Abnormal results of thyroid function studies    Femur fracture, right (HCC)    Elevated troponin       Past Medical History  Past Medical History:   Diagnosis Date    Cellulitis of right lower extremity 6/21/2022    Dementia (HCC)     Memory loss 2018    Mild protein-calorie malnutrition (HCC)  7/28/2021       Past Surgical History  Past Surgical History:   Procedure Laterality Date    ANKLE FRACTURE SURGERY  1996 11/24/24 1010   PT Last Visit   PT Visit Date 11/24/24   Note Type   Note type Evaluation   Pain Assessment   Pain Assessment Tool FLACC   Pain Rating: FLACC (Rest) - Face 1   Pain Rating: FLACC (Rest) - Legs 0   Pain Rating: FLACC (Rest) - Activity 0   Pain Rating: FLACC (Rest) - Cry 1   Pain Rating: FLACC (Rest) - Consolability 0   Score: FLACC (Rest) 2   Pain Rating: FLACC (Activity) - Face 2   Pain Rating: FLACC (Activity) - Legs 1   Pain Rating: FLACC (Activity) - Activity 1   Pain Rating: FLACC (Activity) - Cry 2   Pain Rating: FLACC (Activity) - Consolability 1   Score: FLACC (Activity) 7   Restrictions/Precautions   Weight Bearing Precautions Per Order Yes   RLE Weight Bearing Per Order WBAT   Other Precautions Cognitive;Bed Alarm;Fall Risk;Pain   Home Living   Type of Home House   Home Layout Two level;Stairs to enter with rails;Performs ADLs on one level  (4 RACHAEL; 2nd floor blocked off)   Home Equipment   (none)   Prior Function   Level of [place] Independent with functional mobility;Needs assistance with ADLs;Needs assistance with IADLS   Lives With Alone   Receives Help From  "Family  (family live on the property; have cameras set up in house)   IADLs Family/Friend/Other provides transportation;Family/Friend/Other provides meals;Family/Friend/Other provides medication management   Falls in the last 6 months 1 to 4   Comments Information listed above obtained from family at bedside, as well as with chart review. \"Patient [place] in [place]  Ramon lives next door in same duplex, Perry (primary CG) and Radha (POA) also both live within walking distance.  Patient requires ADL assistance at baseline.  Family has gone to great lengths to ensure patient's safety while maintaining her independence at home.  (2nd floor is blocked off to reduce risk of falls, no access to stove, gated in yard, etc.)  Preference is for patient to return home upon d/c from STR.  Facility preference is [place] due to proximity to home.  Patient does wander at home but family reports no bx concerns.\"   General   Family/Caregiver Present Yes   Cognition   Overall Cognitive Status Impaired   Arousal/Participation Arousable   Orientation Level Disoriented to person;Disoriented to place;Disoriented to time;Disoriented to situation   Memory Decreased recall of precautions;Decreased recall of recent events;Decreased short term memory;Decreased recall of biographical information;Decreased long term memory   Following Commands Follows one step commands inconsistently   RLE Assessment   RLE Assessment   (Grossly 3-/5 observed with functional mobility)   LLE Assessment   LLE Assessment   (Grossly 3-/5 observed with functional mobility)   Bed Mobility   Supine to Sit 2  Maximal assistance   Additional items Assist x 2;HOB elevated;Bedrails;Increased time required;Verbal cues;LE management   Sit to Supine 2  Maximal assistance   Additional items Assist x 2;HOB elevated;Bedrails;Increased time required;Verbal cues;LE management   Transfers   Sit to Stand 2  Maximal assistance "   Additional items Assist x 2;Armrests;Increased time required;Verbal cues   Stand to Sit 2  Maximal assistance   Additional items Assist x 2;Armrests;Increased time required;Verbal cues   Additional Comments B/L HHA, pt achieved full buttock clearance, unable to demonstrate full upright posture   Ambulation/Elevation   Gait pattern Not tested   Balance   Static Sitting Fair -   Dynamic Sitting Poor +   Static Standing Poor -   Endurance Deficit   Endurance Deficit Yes   Activity Tolerance   Activity Tolerance Patient limited by fatigue;Treatment limited secondary to medical complications (Comment)  (cognition)   Medical Staff Made Aware ROBERTA Patino   Nurse Made Aware MERRILL Lorenzana   Assessment   Prognosis Guarded   Problem List Decreased strength;Decreased range of motion;Decreased endurance;Impaired balance;Decreased mobility;Decreased cognition;Decreased safety awareness;Pain;Orthopedic restrictions   Assessment Pt is 84 y.o. female seen for PT evaluation on 11/24/2024 s/p admit to [facility] on 11/22/2024 w/ Femur fracture, right (HCC). POD 1 insertion of right intramedullary femoral nail. PT was consulted to assess pt's functional mobility and d/c needs. Order placed for PT eval and tx. Please reference PLOF and home s/u information above, pt poor historian. At time of eval, pt requiring max assist x2 for bed mobility, transfers. Upon evaluation, pt presenting with impaired functional mobility d/t decreased strength, decreased ROM, decreased endurance, impaired balance, decreased mobility, decreased cognition, decreased safety awareness, orthopedic restrictions of WBAT R LE, pain, and activity intolerance. Pertinent PMHx and current co-morbidities affecting pt's physical performance at time of assessment include: dementia, rash, elevated troponin, chronic venous hypertension, abnormal results of thyroid function studies. Personal factors affecting pt at time of eval include: stairs to enter home, inability to  ambulate household distances, inability to navigate level surfaces w/o external assistance, unable to perform dynamic tasks in community, decreased cognition, positive fall history, decreased initiation and engagement, inability to perform IADLs, inability to perform ADLs, and inability to live alone. The following objective measures performed on IE also reveal limitations: Barthel Index: 10/100, Modified [place]: 5 (severe disability), and AM-PAC 6-Clicks: 6/24. Pt's clinical presentation is currently unstable/unpredictable seen in pt's presentation of advanced age, abnormal lab value(s), need for input for task focus and mobility technique, and ongoing medical assessment. Overall, pt's rehab potential and prognosis to return to PLOF is guarded as impacted by objective findings, warranting pt to receive further skilled PT interventions to address identified impairments, activity limitation(s), and participation restriction(s). Pt to benefit from continued PT tx to address deficits as defined above and maximize level of functional independent mobility. From PT/mobility standpoint, recommend level 2, moderate resource intensity in order to facilitate return to PLOF.   Barriers to Discharge Inaccessible home environment   Goals   Patient Goals none expressed   STG Expiration Date 12/04/24   Short Term Goal #1 In 7-10 days: Increase bilateral LE strength 1/2 grade to facilitate independent mobility, Perform all bed mobility tasks with min A of 1 to decrease caregiver burden, Perform all transfers with min A of 1 to improve independence, Increase static sitting, dynamic sitting, and static standing balance 1/2 grade to decrease risk for falls, Tolerate seated at EOB 15 minutes to facilitate functional task performance, Improve Barthel Index score to 25 or greater to facilitate independence, and PT to see and establish goals for dynamic standing/ambulatory balance, ambulation, elevations/stairs when appropriate   PT  Treatment Day 1   Plan   Treatment/Interventions Functional transfer training;LE strengthening/ROM;Therapeutic exercise;Endurance training;Patient/family training;Equipment eval/education;Bed mobility;Continued evaluation;Spoke to nursing;Spoke to case management   PT Frequency 4-6x/wk   Discharge Recommendation   Rehab Resource Intensity Level, PT II (Moderate Resource Intensity)   AM-PAC Basic Mobility Inpatient   Turning in Flat Bed Without Bedrails 1   Lying on Back to Sitting on Edge of Flat Bed Without Bedrails 1   Moving Bed to Chair 1   Standing Up From Chair Using Arms 1   Walk in Room 1   Climb 3-5 Stairs With Railing 1   Basic Mobility Inpatient Raw Score 6   Turning Head Towards Sound 2   Follow Simple Instructions 1   Low Function Basic Mobility Raw Score  9   Low Function Basic Mobility Standardized Score  12.55   [facility] Level Of Mobility   [facility] Goal 2: Bed activities/Dependent transfer   [facility] Achieved 3: Sit at edge of bed   Modified [place] Scale   Modified [place] Scale 5   Barthel Index   Feeding 5   Bathing 0   Grooming Score 0   Dressing Score 0   Bladder Score 0   Bowels Score 0   Toilet Use Score 0   Transfers (Bed/Chair) Score 5   Mobility (Level Surface) Score 0   Stairs Score 0   Barthel Index Score 10   Additional Treatment Session   Start Time 1015   End Time 1025   Treatment Assessment Pt seen for PT treatment session this date s/p PT eval, consisting of ther act focused on sit to stand transfer technique, standing balance/posture facilitation for 3 sit to stand attempts . Current goals and POC remain appropriate, pt continues to have rehab potential and is making progress towards STGs. Pt prognosis for achieving goals is guarded, pending pt progress with hospitalization/medical status improvements. Pt limited d/t poor orientation and fear of falling. PT recommends level 2, moderate resource intensity upon discharge. Pt continues to be functioning below baseline level, and  remains limited 2* factors listed above. PT will continue to see pt during current hospitalization in order to address the deficits listed above and provide interventions consistent w/ POC in effort to achieve STGs.           Melissa Barajas, PT

## 2024-11-24 NOTE — PROGRESS NOTES
Progress Note - Orthopedics   Name: Suri Patel 84 y.o. female I MRN: [id number]  Unit/Bed#: 2 E 254-01 I Date of Admission: 11/22/2024   Date of Service: 11/24/2024 I Hospital Day: 2    Assessment & Plan  Femur fracture, right (HCC)  Status post insertion of right intramedullary femoral nail with Dr. Castorena (POD 1)  Patient to be weightbearing as tolerated right lower extremity  Range of motion as tolerated right lower extremity  Current hemoglobin at 10.1, remaining stable  Patient refuses blood products  PT/OT as tolerated  Incentive spirometry  Pain control per primary team  DVT prophylaxis per primary team while in house, recommend Lovenox x 4 weeks upon discharge  All other medical comorbidities to manage per primary team  Patient will need follow-up with Dr. Castorena in 2 weeks upon discharge      Refusal of blood product  - Patient does not want blood in any situation, paperwork available with patient signature    Orthopedics service will follow.  Please contact the SecureChat role for the Orthopedics service with any questions/concerns.    Subjective   84 y.o.female seen and examined at bedside with family present.  Given patient's significant dementia and altered mental status history obtained from patient's daughter who states the patient seemed to be comfortable overnight with no acute events.  Further history limited due to mental status.    Objective :  Temp:  [97.3 °F (36.3 °C)-98.6 °F (37 °C)] 98.6 °F (37 °C)  HR:  [] 115  BP: ()/(53-77) 97/67  Resp:  [16] 16  SpO2:  [94 %-100 %] 98 %  O2 Device: Nasal cannula  Nasal Cannula O2 Flow Rate (L/min):  [2 L/min] 2 L/min    Physical Exam  Musculoskeletal: rightlower  Skin intact. No erythema or ecchymosis.  Dressing clean, dry, intact without evidence of strikethrough appreciated  TTP over right lateral hip  Sensation and motor exam not able to be assessed due to patient's cognitive dysfunction  Limb is well-perfused  Thigh and calf soft  "nasal compressible  Brisk capillary refill in all 5 digits      Lab Results: I have reviewed the following results:  Recent Labs     11/22/24  1248 11/23/24  0617 11/24/24  0620   WBC 12.36* 8.25 7.92   HGB 11.8 11.1* 10.1*   HCT 34.6* 32.8* 30.6*    152 148*   BUN 33* 27* 28*   CREATININE 0.65 0.58* 0.64   PTT 27  --   --    INR 0.96  --   --      Blood Culture:    Lab Results   Component Value Date    BLOODCX No Growth After 5 Days. 06/21/2022     Wound Culture: No results found for: \"WOUNDCULT\"  "

## 2024-11-24 NOTE — ASSESSMENT & PLAN NOTE
Healthcare proxy states that patient does not want blood even in emergency situations, have paperwork with her signature

## 2024-11-24 NOTE — ASSESSMENT & PLAN NOTE
Cardio consulted, likely stress from fracture  No further wound workup indicated or desired per family

## 2024-11-24 NOTE — TELEPHONE ENCOUNTER
"[facility] SCHEDULING REQUEST (route request to:  \"[facility] [place] [facility]\" [place])    Patient's Name:  Suri Patel  Patient's MRN:  [id number]      Every effort should be made to have [facility] follow-ups scheduled within the same week of the scheduling request.  The schedulers will add an Appointment Note documenting from whom this request originally came.    Time period within which you want patient scheduled (e.g., \"within 4 weeks\"):  within 1-3 weeks      Any special notes: Please schedule for appointment at office location closest to patient ([place] or [place])   "

## 2024-11-25 ENCOUNTER — TELEPHONE (OUTPATIENT)
Dept: DERMATOLOGY | Facility: CLINIC | Age: 84
End: 2024-11-25

## 2024-11-25 PROBLEM — D62 ACUTE BLOOD LOSS ANEMIA: Status: ACTIVE | Noted: 2021-07-28

## 2024-11-25 LAB
ANION GAP SERPL CALCULATED.3IONS-SCNC: 2 MMOL/L (ref 4–13)
BASOPHILS # BLD AUTO: 0.04 THOUSANDS/ΜL (ref 0–0.1)
BASOPHILS NFR BLD AUTO: 1 % (ref 0–1)
BUN SERPL-MCNC: 33 MG/DL (ref 5–25)
CALCIUM SERPL-MCNC: 8.7 MG/DL (ref 8.4–10.2)
CHLORIDE SERPL-SCNC: 105 MMOL/L (ref 96–108)
CO2 SERPL-SCNC: 29 MMOL/L (ref 21–32)
CREAT SERPL-MCNC: 0.64 MG/DL (ref 0.6–1.3)
EOSINOPHIL # BLD AUTO: 0.08 THOUSAND/ΜL (ref 0–0.61)
EOSINOPHIL NFR BLD AUTO: 1 % (ref 0–6)
ERYTHROCYTE [DISTWIDTH] IN BLOOD BY AUTOMATED COUNT: 15.3 % (ref 11.6–15.1)
GFR SERPL CREATININE-BSD FRML MDRD: 81 ML/MIN/1.73SQ M
GLUCOSE SERPL-MCNC: 110 MG/DL (ref 65–140)
HCT VFR BLD AUTO: 30.1 % (ref 34.8–46.1)
HGB BLD-MCNC: 9.9 G/DL (ref 11.5–15.4)
IMM GRANULOCYTES # BLD AUTO: 0.04 THOUSAND/UL (ref 0–0.2)
IMM GRANULOCYTES NFR BLD AUTO: 1 % (ref 0–2)
LYMPHOCYTES # BLD AUTO: 0.45 THOUSANDS/ΜL (ref 0.6–4.47)
LYMPHOCYTES NFR BLD AUTO: 5 % (ref 14–44)
MCH RBC QN AUTO: 34.7 PG (ref 26.8–34.3)
MCHC RBC AUTO-ENTMCNC: 32.9 G/DL (ref 31.4–37.4)
MCV RBC AUTO: 106 FL (ref 82–98)
MONOCYTES # BLD AUTO: 0.96 THOUSAND/ΜL (ref 0.17–1.22)
MONOCYTES NFR BLD AUTO: 11 % (ref 4–12)
NEUTROPHILS # BLD AUTO: 7.07 THOUSANDS/ΜL (ref 1.85–7.62)
NEUTS SEG NFR BLD AUTO: 81 % (ref 43–75)
NRBC BLD AUTO-RTO: 0 /100 WBCS
PLATELET # BLD AUTO: 158 THOUSANDS/UL (ref 149–390)
PMV BLD AUTO: 10.4 FL (ref 8.9–12.7)
POTASSIUM SERPL-SCNC: 4.1 MMOL/L (ref 3.5–5.3)
RBC # BLD AUTO: 2.85 MILLION/UL (ref 3.81–5.12)
SODIUM SERPL-SCNC: 136 MMOL/L (ref 135–147)
WBC # BLD AUTO: 8.64 THOUSAND/UL (ref 4.31–10.16)

## 2024-11-25 PROCEDURE — 97167 OT EVAL HIGH COMPLEX 60 MIN: CPT

## 2024-11-25 PROCEDURE — 99024 POSTOP FOLLOW-UP VISIT: CPT | Performed by: ORTHOPAEDIC SURGERY

## 2024-11-25 PROCEDURE — 85025 COMPLETE CBC W/AUTO DIFF WBC: CPT | Performed by: STUDENT IN AN ORGANIZED HEALTH CARE EDUCATION/TRAINING PROGRAM

## 2024-11-25 PROCEDURE — 99232 SBSQ HOSP IP/OBS MODERATE 35: CPT | Performed by: STUDENT IN AN ORGANIZED HEALTH CARE EDUCATION/TRAINING PROGRAM

## 2024-11-25 PROCEDURE — 97530 THERAPEUTIC ACTIVITIES: CPT

## 2024-11-25 PROCEDURE — 80048 BASIC METABOLIC PNL TOTAL CA: CPT | Performed by: STUDENT IN AN ORGANIZED HEALTH CARE EDUCATION/TRAINING PROGRAM

## 2024-11-25 RX ADMIN — ACETAMINOPHEN 1000 MG: 10 INJECTION INTRAVENOUS at 20:03

## 2024-11-25 RX ADMIN — ENOXAPARIN SODIUM 30 MG: 30 INJECTION SUBCUTANEOUS at 20:03

## 2024-11-25 RX ADMIN — ACETAMINOPHEN 1000 MG: 10 INJECTION INTRAVENOUS at 05:08

## 2024-11-25 RX ADMIN — MORPHINE SULFATE 2 MG: 2 INJECTION, SOLUTION INTRAMUSCULAR; INTRAVENOUS at 03:25

## 2024-11-25 RX ADMIN — SENNOSIDES 8.6 MG: 8.6 TABLET, FILM COATED ORAL at 08:27

## 2024-11-25 RX ADMIN — ACETAMINOPHEN 1000 MG: 10 INJECTION INTRAVENOUS at 12:17

## 2024-11-25 NOTE — ASSESSMENT & PLAN NOTE
Status post insertion of right intramedullary femoral nail with Dr. Castorena (POD 2)  Weightbearing as tolerated right lower extremity  Range of motion as tolerated right lower extremity  Current hemoglobin at 9.9 remaining stable  Patient refuses blood products  PT/OT as tolerated  Incentive spirometry  Pain control per primary team  DVT prophylaxis per primary team while in house, recommend Lovenox x 4 weeks upon discharge  All other medical comorbidities to manage per primary team  Patient will need follow-up with Dr. Castorena in 2 weeks upon discharge

## 2024-11-25 NOTE — PLAN OF CARE
Problem: Prexisting or High Potential for Compromised Skin Integrity  Goal: Skin integrity is maintained or improved  Description: INTERVENTIONS:  - Identify patients at risk for skin breakdown  - Assess and monitor skin integrity  - Assess and monitor nutrition and hydration status  - Monitor labs   - Assess for incontinence   - Turn and reposition patient  - Assist with mobility/ambulation  - Relieve pressure over bony prominences  - Avoid friction and shearing  - Provide appropriate hygiene as needed including keeping skin clean and dry  - Evaluate need for skin moisturizer/barrier cream  - Collaborate with interdisciplinary team   - Patient/family teaching  - Consider wound care consult   Outcome: Progressing     Problem: PAIN - ADULT  Goal: Verbalizes/displays adequate comfort level or baseline comfort level  Description: Interventions:  - Encourage patient to monitor pain and request assistance  - Assess pain using appropriate pain scale  - Administer analgesics based on type and severity of pain and evaluate response  - Implement non-pharmacological measures as appropriate and evaluate response  - Consider cultural and social influences on pain and pain management  - Notify physician/advanced practitioner if interventions unsuccessful or patient reports new pain  Outcome: Progressing     Problem: INFECTION - ADULT  Goal: Absence or prevention of progression during hospitalization  Description: INTERVENTIONS:  - Assess and monitor for signs and symptoms of infection  - Monitor lab/diagnostic results  - Monitor all insertion sites, i.e. indwelling lines, tubes, and drains  - Monitor endotracheal if appropriate and nasal secretions for changes in amount and color  - [place] appropriate cooling/warming therapies per order  - Administer medications as ordered  - Instruct and encourage patient and family to use good hand hygiene technique  - Identify and instruct in appropriate isolation precautions for  identified infection/condition  Outcome: Progressing  Goal: Absence of fever/infection during neutropenic period  Description: INTERVENTIONS:  - Monitor WBC    Outcome: Progressing     Problem: SAFETY ADULT  Goal: Patient will remain free of falls  Description: INTERVENTIONS:  - Educate patient/family on patient safety including physical limitations  - Instruct patient to call for assistance with activity   - Consult OT/PT to assist with strengthening/mobility   - Keep Call bell within reach  - Keep bed low and locked with side rails adjusted as appropriate  - Keep care items and personal belongings within reach  - Initiate and maintain comfort rounds  - Make Fall Risk Sign visible to staff  - Offer Toileting every  Hours, in advance of need  - Initiate/Maintain alarm  - Obtain necessary fall risk management equipment:   - Apply yellow socks and bracelet for high fall risk patients  - Consider moving patient to room near nurses station  Outcome: Progressing  Goal: Maintain or return to baseline ADL function  Description: INTERVENTIONS:  -  Assess patient's ability to carry out ADLs; assess patient's baseline for ADL function and identify physical deficits which impact ability to perform ADLs (bathing, care of mouth/teeth, toileting, grooming, dressing, etc.)  - Assess/evaluate cause of self-care deficits   - Assess range of motion  - Assess patient's mobility; develop plan if impaired  - Assess patient's need for assistive devices and provide as appropriate  - Encourage maximum independence but intervene and supervise when necessary  - Involve family in performance of ADLs  - Assess for home care needs following discharge   - Consider OT consult to assist with ADL evaluation and planning for discharge  - Provide patient education as appropriate  Outcome: Progressing  Goal: Maintains/Returns to pre admission functional level  Description: INTERVENTIONS:  - Perform AM-PAC 6 Click Basic Mobility/ Daily Activity  assessment daily.  - Set and communicate daily mobility goal to care team and patient/family/caregiver.   - Collaborate with rehabilitation services on mobility goals if consulted  - Perform Range of Motion  times a day.  - Reposition patient every  hours.  - Dangle patient  times a day  - Stand patient  times a day  - Ambulate patient  times a day  - Out of bed to chair  times a day   - Out of bed for meals times a day  - Out of bed for toileting  - Record patient progress and toleration of activity level   Outcome: Progressing     Problem: DISCHARGE PLANNING  Goal: Discharge to home or other facility with appropriate resources  Description: INTERVENTIONS:  - Identify barriers to discharge w/patient and caregiver  - Arrange for needed discharge resources and transportation as appropriate  - Identify discharge learning needs (meds, wound care, etc.)  - Arrange for interpretive services to assist at discharge as needed  - Refer to Case Management Department for coordinating discharge planning if the patient needs post-hospital services based on physician/advanced practitioner order or complex needs related to functional status, cognitive ability, or social support system  Outcome: Progressing     Problem: Knowledge Deficit  Goal: Patient/family/caregiver demonstrates understanding of disease process, treatment plan, medications, and discharge instructions  Description: Complete learning assessment and assess knowledge base.  Interventions:  - Provide teaching at level of understanding  - Provide teaching via preferred learning methods  Outcome: Progressing     Problem: SAFETY,RESTRAINT: NV/NON-SELF DESTRUCTIVE BEHAVIOR  Goal: Remains free of harm/injury (restraint for non violent/non self-detsructive behavior)  Description: INTERVENTIONS:  - Instruct patient/family regarding restraint use   - Assess and monitor physiologic and psychological status   - Provide interventions and comfort measures to meet assessed  patient needs   - Identify and implement measures to help patient regain control  - Assess readiness for release of restraint   Outcome: Progressing  Goal: Returns to optimal restraint-free functioning  Description: INTERVENTIONS:  - Assess the patient's behavior and symptoms that indicate continued need for restraint  - Identify and implement measures to help patient regain control  - Assess readiness for release of restraint   Outcome: Progressing     Problem: Nutrition/Hydration-ADULT  Goal: Nutrient/Hydration intake appropriate for improving, restoring or maintaining nutritional needs  Description: Monitor and assess patient's nutrition/hydration status for malnutrition. Collaborate with interdisciplinary team and initiate plan and interventions as ordered.  Monitor patient's weight and dietary intake as ordered or per policy. Utilize nutrition screening tool and intervene as necessary. Determine patient's food preferences and provide high-protein, high-caloric foods as appropriate.     INTERVENTIONS:  - Monitor oral intake, urinary output, labs, and treatment plans  - Assess nutrition and hydration status and recommend course of action  - Evaluate amount of meals eaten  - Assist patient with eating if necessary   - Allow adequate time for meals  - Recommend/ encourage appropriate diets, oral nutritional supplements, and vitamin/mineral supplements  - Order, calculate, and assess calorie counts as needed  - Recommend, monitor, and adjust tube feedings and TPN/PPN based on assessed needs  - Assess need for intravenous fluids  - Provide specific nutrition/hydration education as appropriate  - Include patient/family/caregiver in decisions related to nutrition  Outcome: Progressing

## 2024-11-25 NOTE — PROGRESS NOTES
Progress Note - Hospitalist   Name: Suri Patel 84 y.o. female I MRN: [id number]  Unit/Bed#: 2 E 254-01 I Date of Admission: 11/22/2024   Date of Service: 11/25/2024 I Hospital Day: 3    Assessment & Plan  Femur fracture, right (HCC)  Status post ORIF postop day 3  Lovenox for DVT prophylaxis for 4 weeks per Ortho  PT/OT-attempted but patient very hesitant to participate, will likely need rehab  Pain control  Acute blood loss anemia  Healthcare proxy states that patient does not want blood even in emergency situations, have paperwork with her signature- hgb stable   Dementia (HCC)  At baseline  S/p zyprexa for behavior disturbance  Rash  Derm consulted, photos in epic  Recommended medications are not available inpatient, she will have outpatient follow-up  Elevated troponin  Cardio consulted, likely stress from fracture  No further wound workup indicated or desired per family    VTE Pharmacologic Prophylaxis: VTE Score: 10 High Risk (Score >/= 5) - Pharmacological DVT Prophylaxis Ordered: enoxaparin (Lovenox). Sequential Compression Devices Ordered.    Mobility:   Basic Mobility Inpatient Raw Score: 6  JH-HLM Goal: 2: Bed activities/Dependent transfer  JH-HLM Achieved: 2: Bed activities/Dependent transfer  JH-HLM Goal achieved. Continue to encourage appropriate mobility.    Patient Centered Rounds: I performed bedside rounds with nursing staff today.   Discussions with Specialists or Other Care Team Provider: ortho    Education and Discussions with Family / Patient: Updated  (carerakers) at bedside.    Current Length of Stay: 3 day(s)  Current Patient Status: Inpatient   Certification Statement: The patient will continue to require additional inpatient hospital stay due to postop hip fracture, rehab placement  Discharge Plan: Anticipate discharge in 24-48 hrs to rehab facility.    Code Status: Level 3 - DNAR and DNI    Subjective   Laying in bed, pleasantly confused. Caretakers at bedside.  Deciding  whether she should go to rehab or come home with them with medical equipment at home.    Objective :  Temp:  [98.4 °F (36.9 °C)-98.6 °F (37 °C)] 98.4 °F (36.9 °C)  HR:  [] 76  BP: (112-123)/(55-86) 112/55  Resp:  [16-18] 18  SpO2:  [97 %-99 %] 99 %  O2 Device: Nasal cannula  Nasal Cannula O2 Flow Rate (L/min):  [2 L/min] 2 L/min    Body mass index is 19.2 kg/m².     Input and Output Summary (last 24 hours):     Intake/Output Summary (Last 24 hours) at 11/25/2024 1135  Last data filed at 11/25/2024 0508  Gross per 24 hour   Intake 250 ml   Output --   Net 250 ml       Physical Exam  Constitutional:       General: She is not in acute distress.  HENT:      Head: Normocephalic and atraumatic.   Cardiovascular:      Rate and Rhythm: Normal rate and regular rhythm.      Heart sounds: Normal heart sounds.   Pulmonary:      Effort: Pulmonary effort is normal.      Breath sounds: Normal breath sounds.   Abdominal:      General: There is no distension.      Palpations: Abdomen is soft.      Tenderness: There is no abdominal tenderness.   Musculoskeletal:      Comments: Rle with bandage   Skin:     Comments: Erythema much improved from admission   Neurological:      Mental Status: She is alert. She is disoriented.           Lab Results: I have reviewed the following results:   Results from last 7 days   Lab Units 11/25/24  0437   WBC Thousand/uL 8.64   HEMOGLOBIN g/dL 9.9*   HEMATOCRIT % 30.1*   PLATELETS Thousands/uL 158   SEGS PCT % 81*   LYMPHO PCT % 5*   MONO PCT % 11   EOS PCT % 1     Results from last 7 days   Lab Units 11/25/24  0437 11/23/24  0617 11/22/24  1248   SODIUM mmol/L 136   < > 136   POTASSIUM mmol/L 4.1   < > 4.6   CHLORIDE mmol/L 105   < > 107   CO2 mmol/L 29   < > 22   BUN mg/dL 33*   < > 33*   CREATININE mg/dL 0.64   < > 0.65   ANION GAP mmol/L 2*   < > 7   CALCIUM mg/dL 8.7   < > 9.8   ALBUMIN g/dL  --   --  4.0   TOTAL BILIRUBIN mg/dL  --   --  1.09*   ALK PHOS U/L  --   --  101   ALT U/L  --   --   23   AST U/L  --   --  33   GLUCOSE RANDOM mg/dL 110   < > 159*    < > = values in this interval not displayed.     Results from last 7 days   Lab Units 11/22/24  1248   INR  0.96     Results from last 7 days   Lab Units 11/22/24  1256   POC GLUCOSE mg/dl 128               Recent Cultures (last 7 days):         Last 24 Hours Medication List:     Current Facility-Administered Medications:     acetaminophen (Ofirmev) injection 1,000 mg, Q6H MISSY, Last Rate: 1,000 mg (11/25/24 0508)    ALPRAZolam (XANAX) tablet 0.25 mg, Daily PRN    enoxaparin (LOVENOX) subcutaneous injection 30 mg, Q24H MISSY    magnesium hydroxide (MILK OF MAGNESIA) oral suspension 30 mL, Daily PRN    morphine injection 2 mg, Q2H PRN    senna (SENOKOT) tablet 8.6 mg, Daily    Administrative Statements   Today, Patient Was Seen By: Mervin Hunter MD      **Please Note: This note may have been constructed using a voice recognition system.**

## 2024-11-25 NOTE — TELEPHONE ENCOUNTER
Scheduled Pt for 12/12 @ 9am virtual visit per betsy Hurst by Dr Tierney & Dr DELCID  2 weeks from inpatient to see if the topicals recommended improve the rash         MD Delmi Lamas,    I just called and spoke to the family over the phone. I reiterated our recommendations form our consult note. The family seemed amenable to a virtual visit in 2 weeks to see if the topicals recommended improve the rash.    Would you be able to schedule a virtual visit?    Thank you,          Previous Messages       ----- Message -----  From: Delmi Miramontes  Sent: 11/25/2024   9:06 AM EST  To: Juana Tuttle MD; Dermatology Amadou Tuttle,    I called to try to schedule this Pt and spoke with her caregiver/relative/emergency contact.  She did not want to schedule the Pt even 3 weeks out as she is 84 years old and just had hip surgery for a fracture.. she does not know how mobile she will be.  She was under the impression when they took pictures in the ER or when the Pt was first admitted that Derm was going to prescribe something to help her based on the pictures.      Please contact Pt's caregiver (Radha Valverde @ [phone]) to discuss this with her.      Thank you!  ----- Message -----  From: Juana Tuttle MD  Sent: 11/23/2024   9:54 PM EST  To: Marycarmen Esiptia

## 2024-11-25 NOTE — OCCUPATIONAL THERAPY NOTE
"          Occupational Therapy Evaluation        Patient Name: Suri Patel  Today's Date: 11/25/2024 11/25/24 1345   OT Last Visit   OT Visit Date 11/25/24   Note Type   Note type Evaluation   Pain Assessment   Pain Assessment Tool FLACC   Pain Location/Orientation Orientation: Right;Location: Hip   Pain Rating: FLACC (Rest) - Face 0   Pain Rating: FLACC (Rest) - Legs 0   Pain Rating: FLACC (Rest) - Activity 0   Pain Rating: FLACC (Rest) - Cry 0   Pain Rating: FLACC (Rest) - Consolability 0   Score: FLACC (Rest) 0   Pain Rating: FLACC (Activity) - Face 1   Pain Rating: FLACC (Activity) - Legs 1   Pain Rating: FLACC (Activity) - Activity 1   Pain Rating: FLACC (Activity) - Cry 1   Pain Rating: FLACC (Activity) - Consolability 0   Score: FLACC (Activity) 4   Restrictions/Precautions   Weight Bearing Precautions Per Order Yes   RLE Weight Bearing Per Order WBAT   Other Precautions Cognitive;Bed Alarm;Fall Risk;Pain   Home Living   Type of Home House   Home Layout Two level;Stairs to enter with rails;Performs ADLs on one level  (4 RACHAEL; 2nd floor blocked off)   Home Equipment Other (Comment)  (none)   Prior Function   Level of [place] Independent with functional mobility;Needs assistance with ADLs;Needs assistance with IADLS   Lives With Alone   Receives Help From Family  (family live on the property; have cameras set up in house)   IADLs Family/Friend/Other provides transportation;Family/Friend/Other provides meals;Family/Friend/Other provides medication management   Falls in the last 6 months 1 to 4   Lifestyle   Autonomy Information listed above obtained from family at bedside, as well as with chart review. \"Patient [place] in [place]. Ramon lives next door in same duplex, Perry (primary CG) and Radha (POA) also both live within walking distance. Patient requires ADL assistance at baseline. Family has gone to great lengths to ensure patient's safety while " Pt back from [place]   "maintaining her independence at home. (2nd floor is blocked off to reduce risk of falls, no access to stove, gated in yard, etc.) Preference is for patient to return home upon d/c from [facility]. Facility preference is [place] due to proximity to home\"   Reciprocal Relationships Supportive family   General   Family/Caregiver Present Yes   Additional General Comments   (cousins present)   ADL   Eating Assistance 1  Total Assistance   Grooming Assistance 1  Total Assistance   UB Bathing Assistance 1  Total Assistance   LB Bathing Assistance 1  Total Assistance   UB Dressing Assistance 1  Total Assistance   LB Dressing Assistance 1  Total Assistance   Toileting Assistance  1  Total Assistance   Functional Assistance 1  Total Assistance   Bed Mobility   Supine to Sit 2  Maximal assistance   Additional items Assist x 2;HOB elevated;Increased time required;Verbal cues;LE management   Sit to Supine 2  Maximal assistance   Additional items Assist x 2;HOB elevated;Bedrails;Increased time required;Verbal cues;LE management   Transfers   Sit to Stand 2  Maximal assistance   Additional items Assist x 2;Armrests;Increased time required;Verbal cues   Stand to Sit 2  Maximal assistance   Additional items Assist x 2;Armrests;Increased time required;Verbal cues   Additional Comments Patient assisted to standing position with max assist of 2, patient demonstrated active weight bearing to bilateral lowers in trial standing.   Functional Mobility   Functional Mobility 2  Maximal assistance   Additional Comments positional changes in bed/ and trial standing position   Additional items Rolling walker   Balance   Static Sitting Poor +   Dynamic Sitting Poor   Static Standing Poor -   Activity Tolerance   Activity Tolerance Patient limited by fatigue;Treatment limited secondary to medical complications (Comment)  (cognition)   Nurse Made Aware MERRILL Lorenzana made aware of therapy outcomes   RUE Assessment   RUE Assessment   (PROM to BUEs appears " "WFL/ no  active functional use of uppers achieved on command.)   LUE Assessment   LUE Assessment   (PROM to BUEs appears WFL/ no active functional use of uppers achieved on command.)   Hand Function   Gross Motor Coordination Impaired   Fine Motor Coordination Impaired   Cognition   Overall Cognitive Status Impaired   Arousal/Participation Poorly responsive;Persistent stimuli required   Attention MENG   Orientation Level Disoriented to person;Disoriented to place;Disoriented to time;Disoriented to situation   Memory Unable to assess   Following Commands Unable to follow one step commands   Assessment   Limitation Decreased ADL status;Decreased UE strength;Decreased endurance;Decreased Safe judgement during ADL;Decreased self-care trans;Decreased high-level ADLs   Prognosis Good   Assessment Patient is a 84 y.o. female seen for OT evaluation s/p admit to [facility] on 11/22/2024 w/Femur fracture, right (HCC). Commorbidities affecting patient's functional performance at time of assessment include:  dementia, rash, elevated troponin, chronic venous hypertension, abnormal results of thyroid function studies.  Orders placed for OT evaluation and treatment.  Performed at least two patient identifiers during session including name and wristband.  Prior to admission,  Information listed above obtained from family at bedside, as well as with chart review. \"Patient lives alone in her unit, [place], surrounded by family. Ramon lives next door in same duplex, Perry (primary CG) and Radha (POA) also both live within walking distance. Patient requires ADL assistance at baseline. Family has gone to great lengths to ensure patient's safety while maintaining her independence at home. (2nd floor is blocked off to reduce risk of falls, no access to stove, gated in yard, etc.) Preference is for patient to return home upon d/c from [facility]. Facility preference is [place] due to proximity to home\" . Personal factors " affecting patient at time of initial evaluation include: limited insight into deficits, difficulty performing ADLs, and difficulty performing IADLs. Upon evaluation, patient requires total assist for UB ADLs, total assist for LB ADLs.   Occupational performance is affected by the following deficits: orientation, attention span, decreased functional use of BUEs, degenerative arthritic joint changes, dynamic sit/ stand balance deficit with poor standing tolerance time for self care and functional mobility, decreased activity tolerance, and postural control and postural alignment deficit, requiring external assistance to complete transitional movements.  Patient to benefit from continued Occupational Therapy treatment while in the hospital to address deficits as defined above and maximize level of functional independence with ADLs and functional mobility. Occupational Performance areas to address include: eating, grooming , bathing/ shower, dressing, toilet hygiene, transfer to all surfaces, functional mobility, IADLs: safety procedures, and Leisure Participation. From OT standpoint, recommendation at time of d/c would be Level 2.   Plan   Treatment Interventions ADL retraining;Functional transfer training;Endurance training;UE strengthening/ROM;Cognitive reorientation;Patient/family training;Equipment evaluation/education;Compensatory technique education;Continued evaluation;Energy conservation;Activityengagement   Goal Expiration Date 12/09/24   OT Frequency 3-5x/wk   Discharge Recommendation   Rehab Resource Intensity Level, OT II (Moderate Resource Intensity)   AM-PAC Daily Activity Inpatient   Lower Body Dressing 1   Bathing 1   Toileting 1   Upper Body Dressing 1   Grooming 1   Eating 1   Daily Activity Raw Score 6   AM-PAC Applied Cognition Inpatient   Following a Speech/Presentation 1   Understanding Ordinary Conversation 1   Taking Medications 1   Remembering Where Things Are Placed or Put Away 1   Remembering  List of 4-5 Errands 1   Taking Care of Complicated Tasks 1   Applied Cognition Raw Score 6   Applied Cognition Standardized Score 7.69   Barthel Index   Feeding 0   Bathing 0   Grooming Score 0   Dressing Score 0   Bladder Score 0   Bowels Score 0   Toilet Use Score 0   Transfers (Bed/Chair) Score 5   Mobility (Level Surface) Score 0   Stairs Score 0   Barthel Index Score 5       Occupational Therapy goals:    1- Patient will complete self feeding task with set up assist  2- Patient will complete rolling to R/L with use of side rail and min assist x1.   3- Patient will increase OOB/ sitting tolerance to 2-4 hours per day for increased participation in self care and leisure tasks with no s/s of exertion.  4- Patient will verbalize and demonstrate weight shifting technique in bed and sitting position with 10% verbal cues  5- Patient will complete grooming task with set up assist.   6-Patient/ Family  will demonstrate competency with UE Home Exercise Program.  7- Patient will complete Interest checklist to explore participation in play/ leisure tasks for increased satisfaction and quality of life.

## 2024-11-25 NOTE — PLAN OF CARE
"  Problem: OCCUPATIONAL THERAPY ADULT  Goal: Performs self-care activities at highest level of function for planned discharge setting.  See evaluation for individualized goals.  Description: Treatment Interventions: ADL retraining, Functional transfer training, Endurance training, UE strengthening/ROM, Cognitive reorientation, Patient/family training, Equipment evaluation/education, Compensatory technique education, Continued evaluation, Energy conservation, Activityengagement          See flowsheet documentation for full assessment, interventions and recommendations.   Note: Limitation: Decreased ADL status, Decreased UE strength, Decreased endurance, Decreased Safe judgement during ADL, Decreased self-care trans, Decreased high-level ADLs  Prognosis: Good  Assessment: Patient is a 84 y.o. female seen for OT evaluation s/p admit to [facility] on 11/22/2024 w/Femur fracture, right (HCC). Commorbidities affecting patient's functional performance at time of assessment include:  dementia, rash, elevated troponin, chronic venous hypertension, abnormal results of thyroid function studies.  Orders placed for OT evaluation and treatment.  Performed at least two patient identifiers during session including name and wristband.  Prior to admission,  Information listed above obtained from family at bedside, as well as with chart review. \"Patient lives alone in her unit, [place], surrounded by family. Ramon lives next door in same duplex, Perry (primary CG) and Radha (POA) also both live within walking distance. Patient requires ADL assistance at baseline. Family has gone to great lengths to ensure patient's safety while maintaining her independence at home. (2nd floor is blocked off to reduce risk of falls, no access to stove, gated in yard, etc.) Preference is for patient to return home upon d/c from STR. Facility preference is [place] due to proximity to home\" . Personal factors affecting patient at " time of initial evaluation include: limited insight into deficits, difficulty performing ADLs, and difficulty performing IADLs. Upon evaluation, patient requires total assist for UB ADLs, total assist for LB ADLs.   Occupational performance is affected by the following deficits: orientation, attention span, decreased functional use of BUEs, degenerative arthritic joint changes, dynamic sit/ stand balance deficit with poor standing tolerance time for self care and functional mobility, decreased activity tolerance, and postural control and postural alignment deficit, requiring external assistance to complete transitional movements.  Patient to benefit from continued Occupational Therapy treatment while in the hospital to address deficits as defined above and maximize level of functional independence with ADLs and functional mobility. Occupational Performance areas to address include: eating, grooming , bathing/ shower, dressing, toilet hygiene, transfer to all surfaces, functional mobility, IADLs: safety procedures, and Leisure Participation. From OT standpoint, recommendation at time of d/c would be Level 2.     Rehab Resource Intensity Level, OT: II (Moderate Resource Intensity)

## 2024-11-25 NOTE — PHYSICAL THERAPY NOTE
PHYSICAL THERAPY NOTE          Patient Name: Suri Patel  Today's Date: 11/25/2024 11/25/24 1313   PT Last Visit   PT Visit Date 11/25/24   Note Type   Note Type Treatment   Pain Assessment   Pain Assessment Tool FLACC   Pain Location/Orientation Orientation: Right;Location: Hip   Pain Rating: FLACC (Rest) - Face 0   Pain Rating: FLACC (Rest) - Legs 0   Pain Rating: FLACC (Rest) - Activity 0   Pain Rating: FLACC (Rest) - Cry 0   Pain Rating: FLACC (Rest) - Consolability 0   Score: FLACC (Rest) 0   Pain Rating: FLACC (Activity) - Face 1   Pain Rating: FLACC (Activity) - Legs 1   Pain Rating: FLACC (Activity) - Activity 1   Pain Rating: FLACC (Activity) - Cry 1   Pain Rating: FLACC (Activity) - Consolability 0   Score: FLACC (Activity) 4   Restrictions/Precautions   Weight Bearing Precautions Per Order Yes   RLE Weight Bearing Per Order WBAT   Other Precautions Cognitive;Bed Alarm;WBS;Fall Risk;Pain;Restraints  (posey mittens)   General   Chart Reviewed Yes   Family/Caregiver Present Yes   Cognition   Overall Cognitive Status Impaired   Arousal/Participation Poorly responsive;Persistent stimuli required   Attention MENG   Orientation Level Disoriented to person;Disoriented to time;Disoriented to place;Disoriented to situation   Memory Unable to assess   Following Commands Unable to follow one step commands   Comments Pt agreeable to PT session   Bed Mobility   Supine to Sit 2  Maximal assistance   Additional items Assist x 2;HOB elevated;Increased time required;Verbal cues;LE management   Sit to Supine 2  Maximal assistance   Additional items Assist x 2;Increased time required;Verbal cues;LE management;Bedrails;HOB elevated   Transfers   Sit to Stand 2  Maximal assistance   Additional items Assist x 2;Increased time required;Verbal cues;Bedrails;HOB elevated   Stand to Sit 2  Maximal assistance   Additional items Assist x  2;Increased time required;Verbal cues   Additional Comments Patient assisted to standing position with max assist of 2, patient able to clear buttock with max A x2 but unable to acheive full standing position, pt required max VC for knee extension and was unable to achieve   Ambulation/Elevation   Gait pattern Not tested   Balance   Static Sitting Poor +   Dynamic Sitting Poor   Static Standing Poor -   Activity Tolerance   Activity Tolerance Patient limited by fatigue;Treatment limited secondary to medical complications (Comment)  (cognition)   Medical Staff Made Aware KOJO Trinidad  (Pt seen as co-treatment with OT due to pt's co-morbidities, clinically unstable presentation, and present impairments, and requirement of 2 therapists to deliver services)   Nurse Made Aware MERRILL Lorenzana   Exercises   Knee AROM Long Arc Quad Sitting;10 reps;AAROM;Bilateral   Assessment   Prognosis Guarded   Problem List Decreased strength;Decreased endurance;Impaired balance;Decreased mobility;Decreased cognition;Pain;Orthopedic restrictions;Impaired judgement;Decreased safety awareness   Assessment Chart review and two person identifiers were completed. Pt seen for PT treatment session this date, consisting of ther act focused on bed mobility, transfers, LE ROM . Pt greeted supine in bed and agreeable to PT session. Pt completed supine to sit with max A x2 and required mod A x1 initially for balance when sitting EOB and progressed towards close supervision. Pt completed seated LE LAQ with AAROM. Pt completed 2 STS attempts with max A x2 and RW. Pt able to clear buttock from bed surface but unable to achieve full standing position despite max VC. Pt completed sit to supine with max A x2. Pt completed roll bilaterally with max A x2.  Pt ended session supine in bed with alarm activated and all needs within reach. Spoke to RN about session outcomes. Pertinent barriers during this session include pain and awareness. Current goals and POC  established on IE remain appropriate. Pt prognosis for achieving goals is good, pending pt progress with hospitalization/medical status improvements, and indicated by ability to follow directions, supportive family/caregivers, and previous response to intervention. Pt limited d/t the presence of intractable pain and cognitive status. Pt continues to be functioning below baseline level, and remains limited due to factors listed above. PT will continue to see pt during current hospitalization in order to address the deficits listed above and provide interventions consistent w/ POC in effort to achieve STGs. PT recommends level 2, moderate resource intensity upon discharge.   Barriers to Discharge Inaccessible home environment   Goals   STG Expiration Date 12/04/24   PT Treatment Day 2   Plan   Treatment/Interventions Functional transfer training;LE strengthening/ROM;Therapeutic exercise;Endurance training;Patient/family training;Equipment eval/education;Bed mobility;Continued evaluation;OT   Progress Slow progress, decreased activity tolerance   PT Frequency 4-6x/wk   Discharge Recommendation   Rehab Resource Intensity Level, PT II (Moderate Resource Intensity)   AM-PAC Basic Mobility Inpatient   Turning in Flat Bed Without Bedrails 1   Lying on Back to Sitting on Edge of Flat Bed Without Bedrails 1   Moving Bed to Chair 1   Standing Up From Chair Using Arms 1   Walk in Room 1   Climb 3-5 Stairs With Railing 1   Basic Mobility Inpatient Raw Score 6   Turning Head Towards Sound 1   Follow Simple Instructions 1   Low Function Basic Mobility Raw Score  8   Low Function Basic Mobility Standardized Score  10.37   [facility] Highest Level Of Mobility   -HLM Goal 2: Bed activities/Dependent transfer   -HLM Achieved 3: Sit at edge of bed   Education   Education Provided Mobility training;Home exercise program;Assistive device   Patient Demonstrates acceptance/verbal understanding   End of Consult   Patient Position at  End of Consult Supine;All needs within reach;Bed/Chair alarm activated     Braeden Ahumada PT, DPT

## 2024-11-25 NOTE — CASE MANAGEMENT
Case Management Discharge Planning Note    Patient name Cheikh Patel  Location 2 [facility] 254/2 E 254-01 MRN [id number]  : 1940 Date 2024       Current Admission Date: 2024  Current Admission Diagnosis:Femur fracture, right (HCC)   Patient Active Problem List    Diagnosis Date Noted Date Diagnosed    Elevated troponin 2024     Femur fracture, right (HCC) 2024     Abnormal results of thyroid function studies 2024     Rash 06/10/2024     Body mass index (BMI) of 19 or less in adult 2022     Dementia (HCC)      Acute blood loss anemia 2021     Chronic venous hypertension (idiopathic) with ulcer of right lower extremity (HCC) 2021       LOS (days): 3  Geometric Mean LOS (GMLOS) (days): 4.4  Days to GMLOS:1.3     OBJECTIVE:  Risk of Unplanned Readmission Score: 13.64         Current admission status: Inpatient   Preferred Pharmacy:   [id number]  [place] - [address] [place] [address] [address]  [facility] [address]  Phone: [phone] Fax: [phone]    Primary Care Provider: Aline Vincent DO    Primary Insurance: MEDICARE  Secondary Insurance: [facility]    DISCHARGE DETAILS:    Discharge planning discussed with:: family Radha and Shelby at bedside  Freedom of Choice: Yes  Comments - Freedom of Choice: CM maintained freedom of choice as it pertains to discharge planning. Patient's family reports preference to take pt home at discharge, though they request medical equiptment in order to care for pt at home: hospital bed, wheelchair, walker, commode, shower bench. They also request HHC for visiting PT/[facility]  CM contacted family/caregiver?: Yes  Were Treatment Team discharge recommendations reviewed with patient/caregiver?: Yes  Did patient/caregiver verbalize understanding of patient care needs?: Yes  Were patient/caregiver advised of the risks associated with not following  Treatment Team discharge recommendations?: Yes    Contacts  Patient Contacts: Radha Amador  Relationship to Patient:: Family  Contact Method: In Person  Reason/Outcome: Continuity of Care, Emergency Contact, Discharge Planning    Requested Home Health Care         Is the patient interested in HHC at discharge?: Yes  Home Health Discipline requested:: Nursing, Physical Therapy, Occupational Therapy  Home Health Agency Name:: Other (TBD; referrals pending)  HHA External Referral Reason (only applicable if external HHA name selected): Services not provided in network or near patient location  Home Health Follow-Up Provider:: PCP  Home Health Services Needed:: Evaluate Functional Status and Safety, Gait/ADL Training, Strengthening/Theraputic Exercises to Improve Function, Post-Op Care and Assessment  Homebound Criteria Met:: Requires the Assistance of Another Person for Safe Ambulation or to Leave the Home, Uses an Assist Device (i.e. cane, walker, etc)  Supporting Clincal Findings:: Fatigues Easliy in Short Distances, Limited Endurance, Cognitive Deficit Requiring the Assistance of Others, Bed Bound or Wheelchair Bound    DME Referral Provided  Referral made for DME?: Yes  DME referral completed for the following items:: Bedside Commode, Wheelchair, Hospital Bed, Shower Chair  DME Supplier Name:: [employer]    Other Referral/Resources/Interventions Provided:  Interventions: HHC, Transportation  Referral Comments: [place] referrals sent in [facility] for HHC. DME ordered in [place].    Would you like to participate in our [facility] service program?  : No - Declined    Treatment Team Recommendation: Short Term Rehab, Home with Home Health Care  Discharge Destination Plan:: Home with Home Health Care

## 2024-11-25 NOTE — PROGRESS NOTES
Progress Note - Orthopedics   Name: Suri Patel 84 y.o. female I MRN: [id number]  Unit/Bed#: 2 E 254-01 I Date of Admission: 11/22/2024   Date of Service: 11/25/2024 I Hospital Day: 3    Assessment & Plan  Femur fracture, right (HCC)  Status post insertion of right intramedullary femoral nail with Dr. Castorena (POD 2)  Weightbearing as tolerated right lower extremity  Range of motion as tolerated right lower extremity  Current hemoglobin at 9.9 remaining stable  Patient refuses blood products  PT/OT as tolerated  Incentive spirometry  Pain control per primary team  DVT prophylaxis per primary team while in house, recommend Lovenox x 4 weeks upon discharge  All other medical comorbidities to manage per primary team  Patient will need follow-up with Dr. Castorena in 2 weeks upon discharge      Refusal of blood product  - Patient does not want blood in any situation, paperwork available with patient signature  Dementia (HCC)  PER PRIMARY TEAM   Rash  PER PRIMARY TEAM   Elevated troponin  PER PRIMARY TEAM       Subjective   84 y.o.female POD#2 Right IM nail fixation right intertrochanteric femur fracture.  Patient currently in soft restraints and unable to answer questions.  Female relative present bedside answering questions at this time.  No immediate concerns from female relative in regards to condition of patient    Objective :  Temp:  [98.4 °F (36.9 °C)-98.6 °F (37 °C)] 98.4 °F (36.9 °C)  HR:  [] 76  BP: (112-123)/(55-86) 112/55  Resp:  [16-18] 18  SpO2:  [97 %-99 %] 99 %  O2 Device: Nasal cannula  Nasal Cannula O2 Flow Rate (L/min):  [2 L/min] 2 L/min      Physical Exam  Musculoskeletal: rightlower  Skin intact. No erythema or ecchymosis.  Dressing clean, dry, intact without evidence of strikethrough appreciated  TTP over right lateral hip  Sensation and motor exam not able to be assessed due to patient's cognitive dysfunction  Limb is well-perfused  Thigh and calf soft nasal compressible  Brisk capillary  "refill in all 5 digits         Lab Results: I have reviewed the following results:  Recent Labs     11/22/24  1248 11/23/24  0617 11/24/24  0620 11/25/24  0437   WBC 12.36* 8.25 7.92 8.64   HGB 11.8 11.1* 10.1* 9.9*   HCT 34.6* 32.8* 30.6* 30.1*    152 148* 158   BUN 33* 27* 28* 33*   CREATININE 0.65 0.58* 0.64 0.64   PTT 27  --   --   --    INR 0.96  --   --   --      Blood Culture:    Lab Results   Component Value Date    BLOODCX No Growth After 5 Days. 06/21/2022     Wound Culture: No results found for: \"WOUNDCULT\"  "

## 2024-11-25 NOTE — CASE MANAGEMENT
Case Management Discharge Planning Note    Patient name Cheikh Patel  Location 2 [email or web address] 254/2 E 254-01 MRN [id number]  : 1940 Date 2024       Current Admission Date: 2024  Current Admission Diagnosis:Femur fracture, right (HCC)   Patient Active Problem List    Diagnosis Date Noted Date Diagnosed    Elevated troponin 2024     Femur fracture, right (HCC) 2024     Abnormal results of thyroid function studies 2024     Rash 06/10/2024     Body mass index (BMI) of 19 or less in adult 2022     Dementia (HCC)      Refusal of blood product 2021     Chronic venous hypertension (idiopathic) with ulcer of right lower extremity (HCC) 2021       LOS (days): 3  Geometric Mean LOS (GMLOS) (days):   Days to GMLOS:     OBJECTIVE:  Risk of Unplanned Readmission Score: 14.64         Current admission status: Inpatient   [facility]   [id number] [place] [facility] [address] [place] [address] [address]  [facility] [address]  Phone: [phone] Fax: [phone]    Primary Care Provider: Aline Vincent DO    Primary Insurance: MEDICARE  Secondary Insurance: [facility]    DISCHARGE DETAILS:    Discharge planning discussed with:: Family Radha and Shelby at bedside  Freedom of Choice: Yes  Comments - Freedom of Choice: CM maintained freedom of choice as it pertains to discharge planning. Patient's family continues to report preference for STR and particularly [facility] at d/c. CM provided pt choice list for STR from previously sent referral. CM explained need for patient to be off of any kind of restraint x at least 24 hrs to be accepted at a facility which they verbalized understanding of. They were agreeable to speak w [facility] liaison today.  CM contacted family/caregiver?: Yes  Were Treatment Team discharge recommendations reviewed with patient/caregiver?: Yes  Did patient/caregiver verbalize understanding of patient care  needs?: N/A- going to facility  Were patient/caregiver advised of the risks associated with not following Treatment Team discharge recommendations?: Yes    Contacts  Patient Contacts: Radha Amador  Relationship to Patient:: Family  Contact Method: In Person  Reason/Outcome: Continuity of Care, Emergency Contact, Discharge Planning    Requested Home Health Care         Is the patient interested in HHC at discharge?: No    DME Referral Provided  Referral made for DME?: No    Other Referral/Resources/Interventions Provided:  Interventions: Short Term Rehab  Referral Comments: Aled Linh from [place] who plans to see pt today. Requested Geriatrics consult w [facility] due to behaviors/ restraints.    Would you like to participate in our [facility] service program?  : No - Declined    Treatment Team Recommendation: Short Term Rehab  Discharge Destination Plan:: Short Term Rehab  Transport at Discharge : [facility]

## 2024-11-25 NOTE — ASSESSMENT & PLAN NOTE
Status post ORIF postop day 3  Lovenox for DVT prophylaxis for 4 weeks per Ortho  PT/OT-attempted but patient very hesitant to participate, will likely need rehab  Pain control

## 2024-11-25 NOTE — ASSESSMENT & PLAN NOTE
Healthcare proxy states that patient does not want blood even in emergency situations, have paperwork with her signature- hgb stable

## 2024-11-25 NOTE — PLAN OF CARE
Problem: PHYSICAL THERAPY ADULT  Goal: Performs mobility at highest level of function for planned discharge setting.  See evaluation for individualized goals.  Description: Treatment/Interventions: Functional transfer training, LE strengthening/ROM, Therapeutic exercise, Endurance training, Patient/family training, Equipment eval/education, Bed mobility, Continued evaluation, Spoke to nursing, Spoke to case management          See flowsheet documentation for full assessment, interventions and recommendations.  Note: Prognosis: Guarded  Problem List: Decreased strength, Decreased endurance, Impaired balance, Decreased mobility, Decreased cognition, Pain, Orthopedic restrictions, Impaired judgement, Decreased safety awareness  Assessment: Chart review and two person identifiers were completed. Pt seen for PT treatment session this date, consisting of ther act focused on bed mobility, transfers, LE ROM . Pt greeted supine in bed and agreeable to PT session. Pt completed supine to sit with max A x2 and required mod A x1 initially for balance when sitting EOB and progressed towards close supervision. Pt completed seated LE LAQ with AAROM. Pt completed 2 STS attempts with max A x2 and RW. Pt able to clear buttock from bed surface but unable to achieve full standing position despite max VC. Pt completed sit to supine with max A x2. Pt completed roll bilaterally with max A x2.  Pt ended session supine in bed with alarm activated and all needs within reach. Spoke to RN about session outcomes. Pertinent barriers during this session include pain and awareness. Current goals and POC established on IE remain appropriate. Pt prognosis for achieving goals is good, pending pt progress with hospitalization/medical status improvements, and indicated by ability to follow directions, supportive family/caregivers, and previous response to intervention. Pt limited d/t the presence of intractable pain and cognitive status. Pt continues  to be functioning below baseline level, and remains limited due to factors listed above. PT will continue to see pt during current hospitalization in order to address the deficits listed above and provide interventions consistent w/ POC in effort to achieve STGs. PT recommends level 2, moderate resource intensity upon discharge.  Barriers to Discharge: Inaccessible home environment     Rehab Resource Intensity Level, PT: II (Moderate Resource Intensity)    See flowsheet documentation for full assessment.

## 2024-11-25 NOTE — PLAN OF CARE
Problem: INFECTION - ADULT  Goal: Absence or prevention of progression during hospitalization  Description: INTERVENTIONS:  - Assess and monitor for signs and symptoms of infection  - Monitor lab/diagnostic results  - Monitor all insertion sites, i.e. indwelling lines, tubes, and drains  - Monitor endotracheal if appropriate and nasal secretions for changes in amount and color  - [place] appropriate cooling/warming therapies per order  - Administer medications as ordered  - Instruct and encourage patient and family to use good hand hygiene technique  - Identify and instruct in appropriate isolation precautions for identified infection/condition  Outcome: Progressing

## 2024-11-26 PROBLEM — K59.09 OTHER CONSTIPATION: Status: ACTIVE | Noted: 2024-11-26

## 2024-11-26 LAB
ANION GAP SERPL CALCULATED.3IONS-SCNC: 5 MMOL/L (ref 4–13)
BASOPHILS # BLD AUTO: 0.03 THOUSANDS/ΜL (ref 0–0.1)
BASOPHILS NFR BLD AUTO: 0 % (ref 0–1)
BUN SERPL-MCNC: 24 MG/DL (ref 5–25)
CALCIUM SERPL-MCNC: 8.8 MG/DL (ref 8.4–10.2)
CHLORIDE SERPL-SCNC: 105 MMOL/L (ref 96–108)
CO2 SERPL-SCNC: 27 MMOL/L (ref 21–32)
CREAT SERPL-MCNC: 0.42 MG/DL (ref 0.6–1.3)
DME PARACHUTE DELIVERY DATE ACTUAL: NORMAL
DME PARACHUTE DELIVERY DATE ACTUAL: NORMAL
DME PARACHUTE DELIVERY DATE EXPECTED: NORMAL
DME PARACHUTE DELIVERY DATE EXPECTED: NORMAL
DME PARACHUTE DELIVERY DATE REQUESTED: NORMAL
DME PARACHUTE DELIVERY DATE REQUESTED: NORMAL
DME PARACHUTE ITEM DESCRIPTION: NORMAL
DME PARACHUTE ORDER STATUS: NORMAL
DME PARACHUTE ORDER STATUS: NORMAL
DME PARACHUTE SUPPLIER NAME: NORMAL
DME PARACHUTE SUPPLIER NAME: NORMAL
DME PARACHUTE SUPPLIER PHONE: NORMAL
DME PARACHUTE SUPPLIER PHONE: NORMAL
EOSINOPHIL # BLD AUTO: 0.12 THOUSAND/ΜL (ref 0–0.61)
EOSINOPHIL NFR BLD AUTO: 2 % (ref 0–6)
ERYTHROCYTE [DISTWIDTH] IN BLOOD BY AUTOMATED COUNT: 14.9 % (ref 11.6–15.1)
GFR SERPL CREATININE-BSD FRML MDRD: 93 ML/MIN/1.73SQ M
GLUCOSE SERPL-MCNC: 103 MG/DL (ref 65–140)
HCT VFR BLD AUTO: 32 % (ref 34.8–46.1)
HGB BLD-MCNC: 10.3 G/DL (ref 11.5–15.4)
IMM GRANULOCYTES # BLD AUTO: 0.03 THOUSAND/UL (ref 0–0.2)
IMM GRANULOCYTES NFR BLD AUTO: 0 % (ref 0–2)
LYMPHOCYTES # BLD AUTO: 0.48 THOUSANDS/ΜL (ref 0.6–4.47)
LYMPHOCYTES NFR BLD AUTO: 7 % (ref 14–44)
MCH RBC QN AUTO: 33.9 PG (ref 26.8–34.3)
MCHC RBC AUTO-ENTMCNC: 32.2 G/DL (ref 31.4–37.4)
MCV RBC AUTO: 105 FL (ref 82–98)
MONOCYTES # BLD AUTO: 0.71 THOUSAND/ΜL (ref 0.17–1.22)
MONOCYTES NFR BLD AUTO: 10 % (ref 4–12)
NEUTROPHILS # BLD AUTO: 6.02 THOUSANDS/ΜL (ref 1.85–7.62)
NEUTS SEG NFR BLD AUTO: 81 % (ref 43–75)
NRBC BLD AUTO-RTO: 0 /100 WBCS
PLATELET # BLD AUTO: 154 THOUSANDS/UL (ref 149–390)
PMV BLD AUTO: 9.9 FL (ref 8.9–12.7)
POTASSIUM SERPL-SCNC: 3.9 MMOL/L (ref 3.5–5.3)
RBC # BLD AUTO: 3.04 MILLION/UL (ref 3.81–5.12)
SODIUM SERPL-SCNC: 137 MMOL/L (ref 135–147)
WBC # BLD AUTO: 7.39 THOUSAND/UL (ref 4.31–10.16)

## 2024-11-26 PROCEDURE — 99232 SBSQ HOSP IP/OBS MODERATE 35: CPT

## 2024-11-26 PROCEDURE — 97530 THERAPEUTIC ACTIVITIES: CPT

## 2024-11-26 PROCEDURE — 80048 BASIC METABOLIC PNL TOTAL CA: CPT | Performed by: STUDENT IN AN ORGANIZED HEALTH CARE EDUCATION/TRAINING PROGRAM

## 2024-11-26 PROCEDURE — 85025 COMPLETE CBC W/AUTO DIFF WBC: CPT | Performed by: STUDENT IN AN ORGANIZED HEALTH CARE EDUCATION/TRAINING PROGRAM

## 2024-11-26 PROCEDURE — 99024 POSTOP FOLLOW-UP VISIT: CPT

## 2024-11-26 RX ORDER — BISACODYL 10 MG
10 SUPPOSITORY, RECTAL RECTAL DAILY PRN
Status: DISCONTINUED | OUTPATIENT
Start: 2024-11-26 | End: 2024-11-27 | Stop reason: HOSPADM

## 2024-11-26 RX ORDER — POLYETHYLENE GLYCOL 3350 17 G/17G
17 POWDER, FOR SOLUTION ORAL DAILY
Status: DISCONTINUED | OUTPATIENT
Start: 2024-11-26 | End: 2024-11-27 | Stop reason: HOSPADM

## 2024-11-26 RX ORDER — DOCUSATE SODIUM 100 MG/1
100 CAPSULE, LIQUID FILLED ORAL 2 TIMES DAILY
Status: DISCONTINUED | OUTPATIENT
Start: 2024-11-26 | End: 2024-11-27 | Stop reason: HOSPADM

## 2024-11-26 RX ADMIN — ENOXAPARIN SODIUM 30 MG: 30 INJECTION SUBCUTANEOUS at 21:21

## 2024-11-26 RX ADMIN — ACETAMINOPHEN 1000 MG: 10 INJECTION INTRAVENOUS at 05:58

## 2024-11-26 RX ADMIN — ACETAMINOPHEN 1000 MG: 10 INJECTION INTRAVENOUS at 21:20

## 2024-11-26 RX ADMIN — BISACODYL 10 MG: 10 SUPPOSITORY RECTAL at 13:12

## 2024-11-26 RX ADMIN — ACETAMINOPHEN 1000 MG: 10 INJECTION INTRAVENOUS at 11:02

## 2024-11-26 NOTE — PLAN OF CARE
Problem: PHYSICAL THERAPY ADULT  Goal: Performs mobility at highest level of function for planned discharge setting.  See evaluation for individualized goals.  Description: Treatment/Interventions: Functional transfer training, LE strengthening/ROM, Therapeutic exercise, Endurance training, Patient/family training, Equipment eval/education, Bed mobility, Continued evaluation, Spoke to nursing, Spoke to case management          See flowsheet documentation for full assessment, interventions and recommendations.  Note: Prognosis: Guarded  Problem List: Decreased strength, Decreased range of motion, Decreased endurance, Impaired balance, Decreased mobility, Decreased cognition, Decreased safety awareness, Pain, Orthopedic restrictions  Assessment: Chart review and two person identifiers were completed. Pt seen for PT treatment session this date, consisting of ther act focused on bed mobility, transfers, standing endurence .  Pt greeted supine in bed and agreeable to PT session. Pt completed supine LE exercises with AAROM. Pt completed bed mobility well with mod A x2. Pt initially requiring min A x1 throughout with instances requiring mod A x1 at trunk to maintain balance. Pt attempted sit pivot but unable to complete due to decreased dynamic balance. Pt completed 2 STS during session with max A x2 with RW and able to maintain standing for ~20s each attempt. Pt completed bed mobility with mod A x2 and completed rolls bilaterally with mod x1. Pt ended session supine in bed with alarm activated and all needs within reach. Spoke to RN about session outcomes. Pertinent barriers during this session include SOB and fatigue and cognition . Current goals and POC established on IE remain appropriate. Pt prognosis for achieving goals is fair, pending pt progress with hospitalization/medical status improvements, and indicated by supportive family/caregivers. Pt limited d/t cognitive status. Pt continues to be functioning below  baseline level, and remains limited due to factors listed above. PT will continue to see pt during current hospitalization in order to address the deficits listed above and provide interventions consistent w/ POC in effort to achieve STGs. PT recommends level 2, moderate resource intensity upon discharge.  Barriers to Discharge: Inaccessible home environment, Other (Comment) (decline in functional mobility)     Rehab Resource Intensity Level, PT: II (Moderate Resource Intensity)    See flowsheet documentation for full assessment.

## 2024-11-26 NOTE — ASSESSMENT & PLAN NOTE
POD#3 s/p insertion of right intramedullary femoral nail with Dr Castorena  WBAT RLE  Range of motion as tolerated RLE  Continue PT/OT as tolerated  Hgb 10.3 this morning, patient refuses blood products  Incentive spirometry  Pain control per primary team  DVT prophylaxis per primary team while inpatient, recommend Lovenox x 4 weeks at discharge  Dispo planning per primary: case management currently working on home health visits as family would prefer patient not to go to rehab facility.  Follow-up with Dr. Castorena  2 weeks post operatively

## 2024-11-26 NOTE — ASSESSMENT & PLAN NOTE
RN and caregiver reported patient with no bowel movement since admission and patient refusing to take ordered bowel regimen medications.  Added Dulcolax suppository daily as needed  Monitor for response

## 2024-11-26 NOTE — PROGRESS NOTES
Progress Note - Orthopedics   Name: Suri Patel 84 y.o. female I MRN: [id number]  Unit/Bed#: 2 E 254-01 I Date of Admission: 11/22/2024   Date of Service: 11/26/2024 I Hospital Day: 4    Assessment & Plan  Femur fracture, right (HCC)  POD#3 s/p insertion of right intramedullary femoral nail with Dr Castorena  WBAT RLE  Range of motion as tolerated RLE  Continue PT/OT as tolerated  Hgb 10.3 this morning, patient refuses blood products  Incentive spirometry  Pain control per primary team  DVT prophylaxis per primary team while inpatient, recommend Lovenox x 4 weeks at discharge  Dispo planning per primary: case management currently working on home health visits as family would prefer patient not to go to rehab facility.  Follow-up with Dr. Castorena  2 weeks post operatively      Acute blood loss anemia  - Patient does not want blood in any situation, paperwork available with patient signature  Dementia (HCC)  PER PRIMARY  Rash  PER PRIMARY  Elevated troponin  PER PRIMARY    Subjective   84 y.o.female  POD#3 right IM nail fixation intertrochanteric femur fracture. No acute events, no new complaints. Pain appears to be well controlled. Patient unable to answer questions about pain control or voice other concerns secondary to cognitive dysfunction, but she appears comfortable and pleasant at time of examination.    Objective :  Temp:  [97.8 °F (36.6 °C)-98 °F (36.7 °C)] 98 °F (36.7 °C)  HR:  [] 78  BP: ()/(52-66) 110/66  Resp:  [18] 18  SpO2:  [97 %-98 %] 97 %  O2 Device: None (Room air)    Physical Exam  Musculoskeletal: right lower extremity  Dressings clean dry and intact without evidence of strikethrough  Surrounding skin without erythema or ecchymosis  Mild TTP over the lateral hip  Thigh and calf soft and compressible  Unable to assess sensation and motor exam given cognitive dysfunction, but slight active plantar and dorsiflexion of the ankle is appreciated during the exam  Limb is well perfused with  "palpable DP pulse and brisk capillary refill      Lab Results: I have reviewed the following results:  Recent Labs     11/24/24  0620 11/25/24  0437 11/26/24  0608   WBC 7.92 8.64 7.39   HGB 10.1* 9.9* 10.3*   HCT 30.6* 30.1* 32.0*   * 158 154   BUN 28* 33* 24   CREATININE 0.64 0.64 0.42*     Blood Culture:    Lab Results   Component Value Date    BLOODCX No Growth After 5 Days. 06/21/2022     Wound Culture: No results found for: \"WOUNDCULT\"  "

## 2024-11-26 NOTE — OCCUPATIONAL THERAPY NOTE
Occupational Therapy Treatment Note        Patient Name: Suri Patel  Today's Date: 11/26/2024 11/26/24 1443   OT Last Visit   OT Visit Date 11/26/24   Note Type   Note Type Treatment   Pain Assessment   Pain Assessment Tool FLACC   Pain Rating: FLACC (Rest) - Legs 0   Pain Rating: FLACC (Rest) - Activity 0   Pain Rating: FLACC (Rest) - Cry 0   Pain Rating: FLACC (Rest) - Consolability 0   Pain Rating: FLACC (Activity) - Face 0   Pain Rating: FLACC (Activity) - Legs 0   Pain Rating: FLACC (Activity) - Activity 0   Pain Rating: FLACC (Activity) - Cry 0   Pain Rating: FLACC (Activity) - Consolability 0   Score: FLACC (Activity) 0   Restrictions/Precautions   Weight Bearing Precautions Per Order Yes   RLE Weight Bearing Per Order WBAT   Other Precautions Cognitive;Chair Alarm;Bed Alarm;Fall Risk;Pain;WBS   ADL   Toileting Assistance  1  Total Assistance   Bed Mobility   Rolling R 3  Moderate assistance   Additional items Assist x 1;Bedrails;Increased time required;Verbal cues;LE management   Rolling L 3  Moderate assistance   Additional items Assist x 1;Bedrails;Increased time required;Verbal cues;LE management   Supine to Sit 3  Moderate assistance   Additional items Assist x 2;Increased time required;Verbal cues;LE management;HOB elevated;Bedrails   Sit to Supine 3  Moderate assistance   Additional items Assist x 2;Bedrails;HOB elevated;Increased time required;Verbal cues;LE management   Transfers   Sit to Stand 2  Maximal assistance   Additional items Assist x 2;Increased time required;Verbal cues;Bedrails;HOB elevated   Stand to Sit 2  Maximal assistance   Additional items Assist x 2;Increased time required;Verbal cues   Additional Comments with RW, attempted stand pivot pt unable to pivot to commode   Functional Mobility   Functional Mobility 3  Moderate assistance   Additional Comments assist of 2/ repeated verbal cues and physical prompts   Additional items Rolling walker   Cognition   Overall  Cognitive Status Impaired   Arousal/Participation Poorly responsive;Persistent stimuli required   Attention Difficulty attending to directions   Orientation Level Oriented to place;Oriented to time;Oriented to situation  (pt able to say name)   Memory Decreased recall of recent events;Decreased short term memory   Following Commands Follows one step commands inconsistently   Activity Tolerance   Activity Tolerance Patient limited by fatigue  (increased work of breathing with positional changes)   Assessment   Assessment Patient participated in Skilled OT session this date with interventions consisting of  therapeutic activities to: increase activity tolerance, increase cardiovascular endurance , elicit righting and equilibrium reactions for improved postural control and alignment during transitional movements, increase dynamic sit/ stand balance during functional activity , increase postural control, and increase OOB/ sitting tolerance . Patient agreeable to OT treatment session, upon arrival patient was found supine in bed, alert, responsive , and in no apparent distress.  In comparison to previous session, patient with improvements in sitting tolerance at edge of bed. Please refer to flow sheet for detailed performance. Patient requiring frequent re direction, verbal cues for safety, verbal cues for correct technique, verbal cues for pacing thru activity steps, cognitive assistance to anticipate next step, one step directives, and frequent rest periods. Patient continues to be functioning below baseline level, occupational performance remains limited secondary to factors listed above and increased risk for falls and injury.   From OT standpoint, recommendation at time of d/c would be Level 2.   Patient to benefit from continued Occupational Therapy treatment while in the hospital to address deficits as defined above and maximize level of functional independence with ADLs and functional mobility.   Plan   Treatment  Interventions ADL retraining;UE strengthening/ROM;Functional transfer training;Endurance training;Patient/family training;Equipment evaluation/education;Compensatory technique education;Continued evaluation;Energy conservation   Goal Expiration Date 12/09/24   OT Frequency 3-5x/wk   Discharge Recommendation   Rehab Resource Intensity Level, OT II (Moderate Resource Intensity)   AM-PAC Daily Activity Inpatient   Lower Body Dressing 1   Bathing 1   Toileting 1   Upper Body Dressing 1   Grooming 1   Eating 3   Daily Activity Raw Score 8   AM-PAC Applied Cognition Inpatient   Following a Speech/Presentation 1   Understanding Ordinary Conversation 1   Taking Medications 1   Remembering Where Things Are Placed or Put Away 1   Remembering List of 4-5 Errands 1   Taking Care of Complicated Tasks 1   Applied Cognition Raw Score 6   Applied Cognition Standardized Score 7.69

## 2024-11-26 NOTE — PHYSICAL THERAPY NOTE
PHYSICAL THERAPY NOTE          Patient Name: Suri Patel  Today's Date: 11/26/2024 11/26/24 1417   PT Last Visit   PT Visit Date 11/26/24   Note Type   Note Type Treatment   Pain Assessment   Pain Assessment Tool FLACC   Pain Rating: FLACC (Rest) - Face 0   Pain Rating: FLACC (Rest) - Legs 0   Pain Rating: FLACC (Rest) - Activity 0   Pain Rating: FLACC (Rest) - Cry 0   Pain Rating: FLACC (Rest) - Consolability 0   Score: FLACC (Rest) 0   Pain Rating: FLACC (Activity) - Face 0   Pain Rating: FLACC (Activity) - Legs 0   Pain Rating: FLACC (Activity) - Activity 0   Pain Rating: FLACC (Activity) - Cry 0   Pain Rating: FLACC (Activity) - Consolability 0   Score: FLACC (Activity) 0   Restrictions/Precautions   Weight Bearing Precautions Per Order Yes   RLE Weight Bearing Per Order WBAT   Other Precautions Cognitive;Chair Alarm;Bed Alarm;Fall Risk;Pain;WBS   General   Chart Reviewed Yes   Family/Caregiver Present Yes   Cognition   Overall Cognitive Status Impaired   Arousal/Participation Poorly responsive;Persistent stimuli required   Attention Difficulty attending to directions   Orientation Level Oriented to place;Oriented to time;Oriented to situation  (pt able to say name)   Memory Decreased recall of recent events;Decreased short term memory   Following Commands Follows one step commands inconsistently   Comments Pt agreeable to PT session   Bed Mobility   Rolling R 3  Moderate assistance   Additional items Assist x 1;Bedrails;Increased time required;Verbal cues;LE management   Rolling L 3  Moderate assistance   Additional items Assist x 1;Bedrails;Increased time required;Verbal cues;LE management   Supine to Sit 3  Moderate assistance   Additional items Assist x 2;Increased time required;Verbal cues;LE management;HOB elevated;Bedrails   Sit to Supine 3  Moderate assistance   Additional items Assist x 2;Bedrails;HOB  elevated;Increased time required;Verbal cues;LE management   Transfers   Sit to Stand 2  Maximal assistance   Additional items Assist x 2;Increased time required;Verbal cues;Bedrails;HOB elevated   Stand to Sit 2  Maximal assistance   Additional items Assist x 2;Increased time required;Verbal cues   Additional Comments with RW, attempted stand pivot pt unable to pivot to commode   Ambulation/Elevation   Gait pattern Not tested   Balance   Static Sitting Poor +   Dynamic Sitting Poor   Static Standing Poor -   Dynamic Standing Poor -   Endurance Deficit   Endurance Deficit Yes   Activity Tolerance   Activity Tolerance Patient limited by fatigue;Patient limited by pain;Treatment limited secondary to medical complications (Comment)  (cognition)   Medical Staff Made Aware KOJO Trinidad  (Pt seen as co-treatment with OT due to pt's decreased activity tolerance, present impairments, and requirement of 2 skilled therapist to deliver services safely.)   Nurse Made Aware Harriett RN   Exercises   Heelslides Supine;5 reps;AAROM;Bilateral   Assessment   Prognosis Guarded   Problem List Decreased strength;Decreased range of motion;Decreased endurance;Impaired balance;Decreased mobility;Decreased cognition;Decreased safety awareness;Pain;Orthopedic restrictions   Assessment Chart review and two person identifiers were completed. Pt seen for PT treatment session this date, consisting of ther act focused on bed mobility, transfers, standing endurence .  Pt greeted supine in bed and agreeable to PT session. Pt completed supine LE exercises with AAROM. Pt completed bed mobility well with mod A x2. Pt initially requiring min A x1 throughout with instances requiring mod A x1 at trunk to maintain balance. Pt attempted sit pivot but unable to complete due to decreased dynamic balance. Pt completed 2 STS during session with max A x2 with RW and able to maintain standing for ~20s each attempt. Pt completed bed mobility with mod A x2 and completed  rolls bilaterally with mod x1. Pt ended session supine in bed with alarm activated and all needs within reach. Spoke to RN about session outcomes. Pertinent barriers during this session include SOB and fatigue and cognition . Current goals and POC established on IE remain appropriate. Pt prognosis for achieving goals is fair, pending pt progress with hospitalization/medical status improvements, and indicated by supportive family/caregivers. Pt limited d/t cognitive status. Pt continues to be functioning below baseline level, and remains limited due to factors listed above. PT will continue to see pt during current hospitalization in order to address the deficits listed above and provide interventions consistent w/ POC in effort to achieve STGs. PT recommends level 2, moderate resource intensity upon discharge.   Barriers to Discharge Inaccessible home environment;Other (Comment)  (decline in functional mobility)   Goals   STG Expiration Date 12/04/24   PT Treatment Day 3   Plan   Treatment/Interventions Functional transfer training;Therapeutic exercise;LE strengthening/ROM;Endurance training;Patient/family training;Equipment eval/education;Bed mobility;Gait training;Continued evaluation;OT   Progress Slow progress, cognitive deficits   PT Frequency 4-6x/wk   Discharge Recommendation   Rehab Resource Intensity Level, PT II (Moderate Resource Intensity)   Equipment Recommended Walker   Walker Package Recommended Wheeled walker   AM-PAC Basic Mobility Inpatient   Turning in Flat Bed Without Bedrails 1   Lying on Back to Sitting on Edge of Flat Bed Without Bedrails 1   Moving Bed to Chair 1   Standing Up From Chair Using Arms 1   Walk in Room 1   Climb 3-5 Stairs With Railing 1   Basic Mobility Inpatient Raw Score 6   Turning Head Towards Sound 3   Follow Simple Instructions 2   Low Function Basic Mobility Raw Score  11   Low Function Basic Mobility Standardized Score  16.55   [facility] Highest Level Of Mobility    -HLM Goal 2: Bed activities/Dependent transfer   -HLM Achieved 3: Sit at edge of bed   Education   Education Provided Mobility training;Assistive device   Patient Reinforcement needed   End of Consult   Patient Position at End of Consult Supine;Bed/Chair alarm activated;All needs within reach;Other (comment)  (family at bedside)   Braeden Ahumada, PT, DPT

## 2024-11-26 NOTE — PROGRESS NOTES
Progress Note - Hospitalist   Name: Suri Patel 84 y.o. female I MRN: [id number]  Unit/Bed#: 2 E 254-01 I Date of Admission: 11/22/2024   Date of Service: 11/26/2024 I Hospital Day: 4    Assessment & Plan  Femur fracture, right (HCC)  Status post ORIF postop day 3  Lovenox for DVT prophylaxis for 4 weeks per Ortho  PT/OT-attempted but patient very hesitant to participate, will likely need rehab  Pain control  WBAT RLE  Follow -up with Dr. Castorena 2 weeks post operatively  Acute blood loss anemia  Healthcare proxy states that patient does not want blood even in emergency situations, have paperwork with her signature- hgb stable   Dementia (HCC)  At baseline  S/p zyprexa for behavior disturbance  Rash  Derm consulted, photos in epic  Recommended medications are not available inpatient, she will have outpatient follow-up  Elevated troponin  Cardio consulted, likely stress from fracture  No further  workup indicated or desired per family  Other constipation  RN and caregiver reported patient with no bowel movement since admission and patient refusing to take ordered bowel regimen medications.  Added Dulcolax suppository daily as needed  Monitor for response    VTE Pharmacologic Prophylaxis: VTE Score: 10 High Risk (Score >/= 5) - Pharmacological DVT Prophylaxis Ordered: enoxaparin (Lovenox). Sequential Compression Devices Ordered.    Mobility:   Basic Mobility Inpatient Raw Score: 6  JH-HLM Goal: 2: Bed activities/Dependent transfer  JH-HLM Achieved: 1: Laying in bed  JH-HLM Goal NOT achieved. Continue with multidisciplinary rounding and encourage appropriate mobility to improve upon JH-HLM goals.    Patient Centered Rounds: I performed bedside rounds with nursing staff today.   Discussions with Specialists or Other Care Team Provider: Yes    Education and Discussions with Family / Patient: Updated  (caregiver) at bedside.    Current Length of Stay: 4 day(s)  Current Patient Status: Inpatient    Certification Statement: The patient will continue to require additional inpatient hospital stay due to constipation  Discharge Plan: Anticipate discharge tomorrow to home with home services.    Code Status: Level 3 - DNAR and DNI    Subjective   Seen and examined laying in bed in no acute distress with caregiver at bedside.  Caregiver reported that patient had not had a bowel movement since admission and patient is refusing to take ordered bowel regimen medication.  Will trial Dulcolax suppository today and monitor for response.    Objective :  Temp:  [97.8 °F (36.6 °C)-98 °F (36.7 °C)] 98 °F (36.7 °C)  HR:  [] 78  BP: ()/(52-66) 110/66  Resp:  [18] 18  SpO2:  [97 %-98 %] 98 %  O2 Device: None (Room air)    Body mass index is 19.2 kg/m².     Input and Output Summary (last 24 hours):     Intake/Output Summary (Last 24 hours) at 11/26/2024 1216  Last data filed at 11/26/2024 0900  Gross per 24 hour   Intake 300 ml   Output --   Net 300 ml       Physical Exam  Vitals and nursing note reviewed.   Constitutional:       General: She is not in acute distress.     Appearance: She is well-developed.   HENT:      Head: Normocephalic and atraumatic.   Eyes:      Conjunctiva/sclera: Conjunctivae normal.   Cardiovascular:      Rate and Rhythm: Normal rate and regular rhythm.      Heart sounds: No murmur heard.  Pulmonary:      Effort: Pulmonary effort is normal. No respiratory distress.      Breath sounds: Normal breath sounds.   Abdominal:      Palpations: Abdomen is soft.      Tenderness: There is no abdominal tenderness.   Musculoskeletal:         General: No swelling.      Cervical back: Neck supple.   Skin:     General: Skin is warm and dry.      Capillary Refill: Capillary refill takes less than 2 seconds.      Findings: Erythema and rash present.   Neurological:      Mental Status: She is alert. Mental status is at baseline.   Psychiatric:         Mood and Affect: Mood normal.            Lines/Drains:              Lab Results: I have reviewed the following results:   Results from last 7 days   Lab Units 11/26/24  0608   WBC Thousand/uL 7.39   HEMOGLOBIN g/dL 10.3*   HEMATOCRIT % 32.0*   PLATELETS Thousands/uL 154   SEGS PCT % 81*   LYMPHO PCT % 7*   MONO PCT % 10   EOS PCT % 2     Results from last 7 days   Lab Units 11/26/24  0608 11/23/24  0617 11/22/24  1248   SODIUM mmol/L 137   < > 136   POTASSIUM mmol/L 3.9   < > 4.6   CHLORIDE mmol/L 105   < > 107   CO2 mmol/L 27   < > 22   BUN mg/dL 24   < > 33*   CREATININE mg/dL 0.42*   < > 0.65   ANION GAP mmol/L 5   < > 7   CALCIUM mg/dL 8.8   < > 9.8   ALBUMIN g/dL  --   --  4.0   TOTAL BILIRUBIN mg/dL  --   --  1.09*   ALK PHOS U/L  --   --  101   ALT U/L  --   --  23   AST U/L  --   --  33   GLUCOSE RANDOM mg/dL 103   < > 159*    < > = values in this interval not displayed.     Results from last 7 days   Lab Units 11/22/24  1248   INR  0.96     Results from last 7 days   Lab Units 11/22/24  1256   POC GLUCOSE mg/dl 128               Recent Cultures (last 7 days):               Last 24 Hours Medication List:     Current Facility-Administered Medications:     acetaminophen (Ofirmev) injection 1,000 mg, Q6H MISSY, Last Rate: 1,000 mg (11/26/24 1102)    ALPRAZolam (XANAX) tablet 0.25 mg, Daily PRN    bisacodyl (DULCOLAX) rectal suppository 10 mg, Daily PRN    docusate sodium (COLACE) capsule 100 mg, BID    enoxaparin (LOVENOX) subcutaneous injection 30 mg, Q24H MISSY    magnesium hydroxide (MILK OF MAGNESIA) oral suspension 30 mL, Daily PRN    morphine injection 2 mg, Q2H PRN    polyethylene glycol (MIRALAX) packet 17 g, Daily    senna (SENOKOT) tablet 8.6 mg, Daily    Administrative Statements   Today, Patient Was Seen By: KAREN Gleason      **Please Note: This note may have been constructed using a voice recognition system.**

## 2024-11-26 NOTE — ASSESSMENT & PLAN NOTE
Status post ORIF postop day 3  Lovenox for DVT prophylaxis for 4 weeks per Ortho  PT/OT-attempted but patient very hesitant to participate, will likely need rehab  Pain control  WBAT RLE  Follow -up with Dr. Castorena 2 weeks post operatively

## 2024-11-26 NOTE — PLAN OF CARE
Problem: OCCUPATIONAL THERAPY ADULT  Goal: Performs self-care activities at highest level of function for planned discharge setting.  See evaluation for individualized goals.  Description: Treatment Interventions: ADL retraining, UE strengthening/ROM, Functional transfer training, Endurance training, Patient/family training, Equipment evaluation/education, Compensatory technique education, Continued evaluation, Energy conservation          See flowsheet documentation for full assessment, interventions and recommendations.   Note: Limitation: Decreased ADL status, Decreased UE strength, Decreased endurance, Decreased Safe judgement during ADL, Decreased self-care trans, Decreased high-level ADLs  Prognosis: Good  Assessment: Patient participated in Skilled OT session this date with interventions consisting of  therapeutic activities to: increase activity tolerance, increase cardiovascular endurance , elicit righting and equilibrium reactions for improved postural control and alignment during transitional movements, increase dynamic sit/ stand balance during functional activity , increase postural control, and increase OOB/ sitting tolerance . Patient agreeable to OT treatment session, upon arrival patient was found supine in bed, alert, responsive , and in no apparent distress.  In comparison to previous session, patient with improvements in sitting tolerance at edge of bed. Please refer to flow sheet for detailed performance. Patient requiring frequent re direction, verbal cues for safety, verbal cues for correct technique, verbal cues for pacing thru activity steps, cognitive assistance to anticipate next step, one step directives, and frequent rest periods. Patient continues to be functioning below baseline level, occupational performance remains limited secondary to factors listed above and increased risk for falls and injury.   From OT standpoint, recommendation at time of d/c would be Level 2.   Patient to  benefit from continued Occupational Therapy treatment while in the hospital to address deficits as defined above and maximize level of functional independence with ADLs and functional mobility.     Rehab Resource Intensity Level, OT: II (Moderate Resource Intensity)

## 2024-11-26 NOTE — CASE MANAGEMENT
Case Management Discharge Planning Note    Patient name Suri Patel  Location 2 EAST 254/2 E 254-01 MRN [id number]  : 1940 Date 2024       Current Admission Date: 2024  Current Admission Diagnosis:Femur fracture, right (HCC)   Patient Active Problem List    Diagnosis Date Noted Date Diagnosed    Other constipation 2024     Elevated troponin 2024     Femur fracture, right (HCC) 2024     Abnormal results of thyroid function studies 2024     Rash 06/10/2024     Body mass index (BMI) of 19 or less in adult 2022     Dementia (HCC)      Acute blood loss anemia 2021     Chronic venous hypertension (idiopathic) with ulcer of right lower extremity (HCC) 2021       LOS (days): 4  Geometric Mean LOS (GMLOS) (days): 4.4  Days to GMLOS:0.3     OBJECTIVE:  Risk of Unplanned Readmission Score: 10.63         Current admission status: Inpatient   [id number] [address] [address]  Phone: [phone] Fax: [phone]    Primary Care Provider: Aline Vincent DO    Primary Insurance: MEDICARE  Secondary Insurance: [facility]    DISCHARGE DETAILS:    Discharge planning discussed with:: family Radha and Shelby at bedside  Freedom of Choice: Yes  Comments - Freedom of Choice: CM maintained freedom of choice as it pertains to discharge planning. Patient's family continues to report plan for pt to return home at discharge. The confirm having spoke to [facility] for hospital bed delivery today. They requested hospital bed x 4 side rails which was ordered. They requested for shower bench to be cancelled and report they already have a walker. Pt choice list provided for [facility], they selected [facility] and agreeable to start of care on Thursday.  CM contacted family/caregiver?: Yes  Were Treatment Team discharge recommendations reviewed with  patient/caregiver?: Yes  Did patient/caregiver verbalize understanding of patient care needs?: Yes  Were patient/caregiver advised of the risks associated with not following Treatment Team discharge recommendations?: Yes    Contacts  Patient Contacts: Radha Amador  Relationship to Patient:: Family  Contact Method: In Person  Reason/Outcome: Continuity of Care, Emergency Contact, Discharge Planning    Requested Home Health Care         Is the patient interested in HHC at discharge?: Yes  Home Health Discipline requested:: Nursing, Occupational Therapy, Physical Therapy  [facility] Health Agency Name:: [facility]  A External Referral Reason (only applicable if external HHA name selected): Patient has established relationship with provider  Home Health Follow-Up Provider:: PCP  Home Health Services Needed:: Post-Op Care and Assessment, Gait/ADL Training, Evaluate Functional Status and Safety, Strengthening/Theraputic Exercises to Improve Function  Homebound Criteria Met:: Requires the Assistance of Another Person for Safe Ambulation or to Leave the Home, Uses an Assist Device (i.e. cane, walker, etc)  Supporting Clincal Findings:: Bed Bound or Wheelchair Bound, Fatigues Easliy in Short Distances, Limited Endurance, Cognitive Deficit Requiring the Assistance of Others    DME Referral Provided  Referral made for DME?: Yes  DME referral completed for the following items:: Wheelchair, Hospital Bed, Bedside Commode    Other Referral/Resources/Interventions Provided:  Interventions: DME, HHC  Referral Comments: Spoke to Amarijt from [facility] for order adjustment. Hospital bed x 4 side rails orders, shower bench cancelled and updated home address as per Shelby: [address]. Family confirms having gotten in contact w [facility] and plan for hospital bed delivery today. Pt choice list provided for [facility] reserved per pt's family preference and AVS updated, plan for SOC 11/28. SLIM updated.    Would you  like to participate in our [facility] service program?  : No - Declined    Treatment Team Recommendation: Short Term Rehab, Home with Home Health Care  Discharge Destination Plan:: Home with Home Health Care  Transport at Discharge : [facility] s[facility]

## 2024-11-26 NOTE — PLAN OF CARE
Problem: Prexisting or High Potential for Compromised Skin Integrity  Goal: Skin integrity is maintained or improved  Description: INTERVENTIONS:  - Identify patients at risk for skin breakdown  - Assess and monitor skin integrity  - Assess and monitor nutrition and hydration status  - Monitor labs   - Assess for incontinence   - Turn and reposition patient  - Assist with mobility/ambulation  - Relieve pressure over bony prominences  - Avoid friction and shearing  - Provide appropriate hygiene as needed including keeping skin clean and dry  - Evaluate need for skin moisturizer/barrier cream  - Collaborate with interdisciplinary team   - Patient/family teaching  - Consider wound care consult   Outcome: Progressing     Problem: PAIN - ADULT  Goal: Verbalizes/displays adequate comfort level or baseline comfort level  Description: Interventions:  - Encourage patient to monitor pain and request assistance  - Assess pain using appropriate pain scale  - Administer analgesics based on type and severity of pain and evaluate response  - Implement non-pharmacological measures as appropriate and evaluate response  - Consider cultural and social influences on pain and pain management  - Notify physician/advanced practitioner if interventions unsuccessful or patient reports new pain  Outcome: Progressing     Problem: INFECTION - ADULT  Goal: Absence or prevention of progression during hospitalization  Description: INTERVENTIONS:  - Assess and monitor for signs and symptoms of infection  - Monitor lab/diagnostic results  - Monitor all insertion sites, i.e. indwelling lines, tubes, and drains  - Monitor endotracheal if appropriate and nasal secretions for changes in amount and color  - [place] appropriate cooling/warming therapies per order  - Administer medications as ordered  - Instruct and encourage patient and family to use good hand hygiene technique  - Identify and instruct in appropriate isolation precautions for  identified infection/condition  Outcome: Progressing  Goal: Absence of fever/infection during neutropenic period  Description: INTERVENTIONS:  - Monitor WBC    Outcome: Progressing     Problem: SAFETY ADULT  Goal: Patient will remain free of falls  Description: INTERVENTIONS:  - Educate patient/family on patient safety including physical limitations  - Instruct patient to call for assistance with activity   - Consult OT/PT to assist with strengthening/mobility   - Keep Call bell within reach  - Keep bed low and locked with side rails adjusted as appropriate  - Keep care items and personal belongings within reach  - Initiate and maintain comfort rounds  - Make Fall Risk Sign visible to staff  - Offer Toileting every  Hours, in advance of need  - Initiate/Maintain alarm  - Obtain necessary fall risk management equipment:   - Apply yellow socks and bracelet for high fall risk patients  - Consider moving patient to room near nurses station  Outcome: Progressing  Goal: Maintain or return to baseline ADL function  Description: INTERVENTIONS:  -  Assess patient's ability to carry out ADLs; assess patient's baseline for ADL function and identify physical deficits which impact ability to perform ADLs (bathing, care of mouth/teeth, toileting, grooming, dressing, etc.)  - Assess/evaluate cause of self-care deficits   - Assess range of motion  - Assess patient's mobility; develop plan if impaired  - Assess patient's need for assistive devices and provide as appropriate  - Encourage maximum independence but intervene and supervise when necessary  - Involve family in performance of ADLs  - Assess for home care needs following discharge   - Consider OT consult to assist with ADL evaluation and planning for discharge  - Provide patient education as appropriate  Outcome: Progressing  Goal: Maintains/Returns to pre admission functional level  Description: INTERVENTIONS:  - Perform AM-PAC 6 Click Basic Mobility/ Daily Activity  assessment daily.  - Set and communicate daily mobility goal to care team and patient/family/caregiver.   - Collaborate with rehabilitation services on mobility goals if consulted  - Perform Range of Motion  times a day.  - Reposition patient every  hours.  - Dangle patient  times a day  - Stand patient  times a day  - Ambulate patient  times a day  - Out of bed to chair  times a day   - Out of bed for meals  times a day  - Out of bed for toileting  - Record patient progress and toleration of activity level   Outcome: Progressing     Problem: Knowledge Deficit  Goal: Patient/family/caregiver demonstrates understanding of disease process, treatment plan, medications, and discharge instructions  Description: Complete learning assessment and assess knowledge base.  Interventions:  - Provide teaching at level of understanding  - Provide teaching via preferred learning methods  Outcome: Progressing     Problem: DISCHARGE PLANNING  Goal: Discharge to home or other facility with appropriate resources  Description: INTERVENTIONS:  - Identify barriers to discharge w/patient and caregiver  - Arrange for needed discharge resources and transportation as appropriate  - Identify discharge learning needs (meds, wound care, etc.)  - Arrange for interpretive services to assist at discharge as needed  - Refer to Case Management Department for coordinating discharge planning if the patient needs post-hospital services based on physician/advanced practitioner order or complex needs related to functional status, cognitive ability, or social support system  Outcome: Progressing     Problem: SAFETY,RESTRAINT: NV/NON-SELF DESTRUCTIVE BEHAVIOR  Goal: Remains free of harm/injury (restraint for non violent/non self-detsructive behavior)  Description: INTERVENTIONS:  - Instruct patient/family regarding restraint use   - Assess and monitor physiologic and psychological status   - Provide interventions and comfort measures to meet assessed  patient needs   - Identify and implement measures to help patient regain control  - Assess readiness for release of restraint   Outcome: Progressing  Goal: Returns to optimal restraint-free functioning  Description: INTERVENTIONS:  - Assess the patient's behavior and symptoms that indicate continued need for restraint  - Identify and implement measures to help patient regain control  - Assess readiness for release of restraint   Outcome: Progressing     Problem: Nutrition/Hydration-ADULT  Goal: Nutrient/Hydration intake appropriate for improving, restoring or maintaining nutritional needs  Description: Monitor and assess patient's nutrition/hydration status for malnutrition. Collaborate with interdisciplinary team and initiate plan and interventions as ordered.  Monitor patient's weight and dietary intake as ordered or per policy. Utilize nutrition screening tool and intervene as necessary. Determine patient's food preferences and provide high-protein, high-caloric foods as appropriate.     INTERVENTIONS:  - Monitor oral intake, urinary output, labs, and treatment plans  - Assess nutrition and hydration status and recommend course of action  - Evaluate amount of meals eaten  - Assist patient with eating if necessary   - Allow adequate time for meals  - Recommend/ encourage appropriate diets, oral nutritional supplements, and vitamin/mineral supplements  - Order, calculate, and assess calorie counts as needed  - Recommend, monitor, and adjust tube feedings and TPN/PPN based on assessed needs  - Assess need for intravenous fluids  - Provide specific nutrition/hydration education as appropriate  - Include patient/family/caregiver in decisions related to nutrition  Outcome: Progressing

## 2024-11-27 VITALS
DIASTOLIC BLOOD PRESSURE: 50 MMHG | HEART RATE: 84 BPM | TEMPERATURE: 97.8 F | HEIGHT: 69 IN | SYSTOLIC BLOOD PRESSURE: 91 MMHG | RESPIRATION RATE: 18 BRPM | OXYGEN SATURATION: 95 % | WEIGHT: 130 LBS | BODY MASS INDEX: 19.26 KG/M2

## 2024-11-27 DIAGNOSIS — R32 URINARY INCONTINENCE, UNSPECIFIED TYPE: Primary | ICD-10-CM

## 2024-11-27 PROCEDURE — 99239 HOSP IP/OBS DSCHRG MGMT >30: CPT | Performed by: STUDENT IN AN ORGANIZED HEALTH CARE EDUCATION/TRAINING PROGRAM

## 2024-11-27 RX ORDER — OXYCODONE HCL 5 MG/5 ML
2.5 SOLUTION, ORAL ORAL EVERY 6 HOURS PRN
Qty: 15 ML | Refills: 0 | Status: SHIPPED | OUTPATIENT
Start: 2024-11-27 | End: 2024-12-07

## 2024-11-27 RX ORDER — ENOXAPARIN SODIUM 100 MG/ML
40 INJECTION SUBCUTANEOUS DAILY
Qty: 10.8 ML | Refills: 0 | Status: SHIPPED | OUTPATIENT
Start: 2024-11-27 | End: 2024-12-24

## 2024-11-27 RX ORDER — SENNA AND DOCUSATE SODIUM 50; 8.6 MG/1; MG/1
1 TABLET, FILM COATED ORAL DAILY
Qty: 30 TABLET | Refills: 0 | Status: SHIPPED | OUTPATIENT
Start: 2024-11-27

## 2024-11-27 RX ADMIN — SENNOSIDES 8.6 MG: 8.6 TABLET, FILM COATED ORAL at 09:33

## 2024-11-27 RX ADMIN — ACETAMINOPHEN 1000 MG: 10 INJECTION INTRAVENOUS at 09:25

## 2024-11-27 RX ADMIN — DOCUSATE SODIUM 100 MG: 100 CAPSULE, LIQUID FILLED ORAL at 09:33

## 2024-11-27 RX ADMIN — POLYETHYLENE GLYCOL 3350 17 G: 17 POWDER, FOR SOLUTION ORAL at 09:34

## 2024-11-27 RX ADMIN — ACETAMINOPHEN 1000 MG: 10 INJECTION INTRAVENOUS at 12:20

## 2024-11-27 RX ADMIN — ACETAMINOPHEN 1000 MG: 10 INJECTION INTRAVENOUS at 03:15

## 2024-11-27 NOTE — CASE MANAGEMENT
Case Management Discharge Planning Note    Patient name Suri Patel  Location 2 [facility] 263/2 E 263-01 MRN [id number]  : 1940 Date 2024       Current Admission Date: 2024  Current Admission Diagnosis:Femur fracture, right (HCC)   Patient Active Problem List    Diagnosis Date Noted Date Diagnosed    Urinary incontinence, unspecified type 2024     Other constipation 2024     Elevated troponin 2024     Femur fracture, right (HCC) 2024     Abnormal results of thyroid function studies 2024     Rash 06/10/2024     Body mass index (BMI) of 19 or less in adult 2022     Dementia (HCC)      Acute blood loss anemia 2021     Chronic venous hypertension (idiopathic) with ulcer of right lower extremity (HCC) 2021       LOS (days): 5  Geometric Mean LOS (GMLOS) (days): 4.4  Days to GMLOS:-0.5     OBJECTIVE:  Risk of Unplanned Readmission Score: 11.16         Current admission status: Inpatient   [facility]   [id number] [address] [address]  Phone: [phone] Fax: [phone]    Primary Care Provider: Aline Vincent DO    Primary Insurance: MEDICARE  Secondary Insurance: [facility]    DISCHARGE DETAILS:    Discharge planning discussed with:: patient's family member Radha at bedside who had Shelby on speaker phone  Freedom of Choice: Yes  Comments - Freedom of Choice: CM maintained freedom of choice as it pertains to discharge planning. Patient's family agreeable to d/c to home today, they request stretcher transport home to [address] [place] [address]. CM advied of possibility of receiving a bill for transport, agreeable to transport this afternoon.  CM contacted family/caregiver?: Yes  Were Treatment Team discharge recommendations reviewed with patient/caregiver?: Yes  Did patient/caregiver verbalize understanding of patient care needs?: Yes  Were  patient/caregiver advised of the risks associated with not following Treatment Team discharge recommendations?: Yes    Contacts  Patient Contacts: Radha Amador  Relationship to Patient:: Family  Contact Method: In Person  Reason/Outcome: Continuity of Care, Emergency Contact, Discharge Planning    Requested Home Health Care         Is the patient interested in HHC at discharge?: Yes      Would you like to participate in our [facility] service program?  : No - Declined    Treatment Team Recommendation: Short Term Rehab, Home with Home Health Care  Discharge Destination Plan:: Home with Home Health Care  Transport at Discharge : Stretcher van     Number/Name of Dispatcher: Roundtrip  Transported by (Company and Unit #): [employer]  ETA of Transport (Date): 11/27/24  ETA of Transport (Time): 1315     IMM Given (Date):: 11/27/24  IMM Given to:: Family  Family notified:: CM reviewed IMM with patient's family at bedside who verbalized understanding of rights and provided verbal acknowledgement of IMM. Copy of IMM left with patient at bedside. IMM returned to medical records.    PCS form in chart. SLIM and bedside RN informed of scheduled  time.

## 2024-11-27 NOTE — PLAN OF CARE
Problem: Prexisting or High Potential for Compromised Skin Integrity  Goal: Skin integrity is maintained or improved  Description: INTERVENTIONS:  - Identify patients at risk for skin breakdown  - Assess and monitor skin integrity  - Assess and monitor nutrition and hydration status  - Monitor labs   - Assess for incontinence   - Turn and reposition patient  - Assist with mobility/ambulation  - Relieve pressure over bony prominences  - Avoid friction and shearing  - Provide appropriate hygiene as needed including keeping skin clean and dry  - Evaluate need for skin moisturizer/barrier cream  - Collaborate with interdisciplinary team   - Patient/family teaching  - Consider wound care consult   Outcome: Progressing     Problem: PAIN - ADULT  Goal: Verbalizes/displays adequate comfort level or baseline comfort level  Description: Interventions:  - Encourage patient to monitor pain and request assistance  - Assess pain using appropriate pain scale  - Administer analgesics based on type and severity of pain and evaluate response  - Implement non-pharmacological measures as appropriate and evaluate response  - Consider cultural and social influences on pain and pain management  - Notify physician/advanced practitioner if interventions unsuccessful or patient reports new pain  Outcome: Progressing     Problem: INFECTION - ADULT  Goal: Absence or prevention of progression during hospitalization  Description: INTERVENTIONS:  - Assess and monitor for signs and symptoms of infection  - Monitor lab/diagnostic results  - Monitor all insertion sites, i.e. indwelling lines, tubes, and drains  - Monitor endotracheal if appropriate and nasal secretions for changes in amount and color  - [place] appropriate cooling/warming therapies per order  - Administer medications as ordered  - Instruct and encourage patient and family to use good hand hygiene technique  - Identify and instruct in appropriate isolation precautions for  identified infection/condition  Outcome: Progressing

## 2024-11-27 NOTE — PROGRESS NOTES
Patient:    MRN:  [id number]    Linoin Request ID:  [id number]    Level of care reserved:    Partner Reserved:    Clinical needs requested:    Geography searched:  35 miles around [id number]    Start of Service:    Request sent:  12:28pm EST on 11/24/2024 by Carey Castorena    Partner reserved:    Choice list shared:  10:33am EST on 11/25/2024 by Cristel Wolf

## 2024-11-27 NOTE — CASE MANAGEMENT
Case Management Discharge Planning Note    Patient name Suri Patel  Location 2 EAST 263/2 E 263-01 MRN [id number]  : 1940 Date 2024       Current Admission Date: 2024  Current Admission Diagnosis:Femur fracture, right (HCC)   Patient Active Problem List    Diagnosis Date Noted Date Diagnosed    Other constipation 2024     Elevated troponin 2024     Femur fracture, right (HCC) 2024     Abnormal results of thyroid function studies 2024     Rash 06/10/2024     Body mass index (BMI) of 19 or less in adult 2022     Dementia (HCC)      Acute blood loss anemia 2021     Chronic venous hypertension (idiopathic) with ulcer of right lower extremity (HCC) 2021       LOS (days): 5  Geometric Mean LOS (GMLOS) (days): 4.4  Days to GMLOS:-0.5     OBJECTIVE:  Risk of Unplanned Readmission Score: 10.77         Current admission status: Inpatient   [id number] [address] [place] [address]  Phone: [phone] Fax: [phone]    Primary Care Provider: Aline Vincent DO    Primary Insurance: MEDICARE  Secondary Insurance: [facility]    DISCHARGE DETAILS:    Other Referral/Resources/Interventions Provided:  Interventions: DME  Referral Comments: Spoke to [facility] health rep Ocasio and confirmed that all ordered DME: hospital bed, wheelchair, and commode, were all delivered to pt's home already.

## 2024-11-27 NOTE — DISCHARGE SUMMARY
Medical Problems       Resolved Problems  Date Reviewed: 11/26/2024   None       Discharging Physician / Practitioner: Hernan Pitt MD  PCP: Aline Vincent DO  Admission Date:   Admission Orders (From admission, onward)       Ordered        11/22/24 1402  INPATIENT ADMISSION  Once                          Discharge Date: 11/27/24    Consultations During Hospital Stay:  Orthopedics    Procedures Performed:   Status post ORIF    Significant Findings / Test Results:   XR hip/pelv 2-3 vws right if performed   Final Result      Fluoroscopy provided for procedure guidance.      Please refer to the separate procedure note for additional details.                  Workstation performed: [id number]         XR chest 1 view portable   ED Interpretation   NAD      Final Result      No acute cardiopulmonary disease.            Workstation performed: [id number]         XR femur 2 views RIGHT   ED Interpretation   Impacted femoral neck fracture      Final Result      Acute fracture of the proximal right femur with moderate varus angulation at the fracture site which is along the intertrochanteric line         Computerized Assisted Algorithm (CAA) may have been used to analyze all applicable images.         Workstation performed: [id number]         CT head without contrast   Final Result      No acute intracranial abnormality. Mild chronic white matter microangiopathic changes.                  Workstation performed: [id number]         CT spine cervical without contrast   Final Result      No cervical spine fracture identified.      Chronic compression deformity involving the T4 vertebral body resulting in approximately 40 to 50% height loss.      Scattered lucencies involving the visualized cervical spine, indeterminate. While these may represent areas of focal osteopenia, or hemangiomas, more aggressive lytic lesion such as metastatic disease or myeloma cannot be excluded. Correlation with the    patient's clinical and past  medical history is recommended, and recommend further evaluation with cervical spine MRI without and with contrast on a nonemergent basis.      The study was marked in EPIC for immediate notification.            Workstation performed: [id number]         CT chest abdomen pelvis w contrast   Final Result      No consolidation pleural effusion or pneumothorax identified.      Constipation. No bowel wall thickening or bowel obstruction.      No intraperitoneal free air or ascites noted. No active intravasation of contrast noted.      Acute, comminuted intertrochanteric right femoral fracture with varus angulation.      Healing, subacute to chronic left sacral insufficiency fracture with sclerosis. Healed fracture involving the left lateral 10th rib.      Stable chronic compression deformities of the T4 vertebral body.      Pectus excavatum deformity again demonstrated.      The study was marked in EPIC for immediate notification.         Workstation performed: [id number]               Incidental Findings:   As mentioned above    Test Results Pending at Discharge (will require follow up):   None     Outpatient Tests Requested:  None    Complications: Same as in hospital course    Reason for Admission: Fall    Hospital Course:   Suri Patel is a 84 y.o. female patient who originally presented to the hospital on 11/22/2024 due to    Femur fracture, right (HCC)  Status post ORIF postop day 3  Lovenox for DVT prophylaxis for 4 weeks per Ortho  PT/OT-attempted but patient very hesitant to participate, will likely need rehab  Pain control  WBAT RLE  Follow -up with Dr. Castorena 2 weeks post operatively  DVT prophylaxis for 4 weeks  Tylenol for mild Oxy 2.5 mg for moderate to severe pain    Acute blood loss anemia:  Healthcare proxy states that patient does not want blood even in emergency situations, have paperwork with her signature- hgb stable       Dementia (HCC)  At baseline  S/p zyprexa for behavior  "disturbance      Rash  Derm consulted, photos in epic  Recommended medications are not available inpatient, she will have outpatient follow-up    Elevated troponin  Cardio consulted, likely stress from fracture  No further  workup indicated or desired per family      Other constipation  Laxatives as needed at home              Please see above list of diagnoses and related plan for additional information.     Condition at Discharge: stable    Discharge Day Visit / Exam:   Subjective: Patient is pleasantly confused.  Sitting comfortably in the bed.  Caregiver at bedside.  She feels that she is at her baseline currently  Vitals: Blood Pressure: 91/50 (11/27/24 0732)  Pulse: 84 (11/27/24 0732)  Temperature: 97.8 °F (36.6 °C) (11/27/24 0732)  Temp Source: Oral (11/26/24 0700)  Respirations: 18 (11/26/24 0700)  Height: 5' 9\" (175.3 cm) (11/23/24 1133)  Weight - Scale: 59 kg (130 lb) (11/23/24 1133)  SpO2: 95 % (11/27/24 0732)  Exam:   Physical Exam   Constitutional: no Acute distress  HEENT: no Pallor or icterus  CVS: S1 plus S2  Respiratory: Normal vascular breathe without crackles and wheeze  Gastroenterology: Soft nontender without any palpable mass  Skin: No bruises or ecchymosis  Neurology: No focal logical deficit     Discussion with Family: Updated  (caregiver) at bedside.    Discharge instructions/Information to patient and family:   See after visit summary for information provided to patient and family.      Provisions for Follow-Up Care:  See after visit summary for information related to follow-up care and any pertinent home health orders.      Mobility at time of Discharge:   Basic Mobility Inpatient Raw Score: 6  JH-HLM Goal: 2: Bed activities/Dependent transfer  JH-HLM Achieved: 1: Laying in bed  HLM Goal NOT achieved. Continue to encourage mobility in post discharge setting.     Disposition:   Home with VNA Services (Reminder: Complete face to face encounter)    Planned Readmission: none   "   Discharge Statement:  I spent 37 minutes discharging the patient. This time was spent on the day of discharge. I had direct contact with the patient on the day of discharge. Greater than 50% of the total time was spent examining patient, answering all patient questions, arranging and discussing plan of care with patient as well as directly providing post-discharge instructions.  Additional time then spent on discharge activities.    Discharge Medications:  See after visit summary for reconciled discharge medications provided to patient and/or family.      **Please Note: This note may have been constructed using a voice recognition system**

## 2024-11-27 NOTE — PLAN OF CARE
Problem: Prexisting or High Potential for Compromised Skin Integrity  Goal: Skin integrity is maintained or improved  Description: INTERVENTIONS:  - Identify patients at risk for skin breakdown  - Assess and monitor skin integrity  - Assess and monitor nutrition and hydration status  - Monitor labs   - Assess for incontinence   - Turn and reposition patient  - Assist with mobility/ambulation  - Relieve pressure over bony prominences  - Avoid friction and shearing  - Provide appropriate hygiene as needed including keeping skin clean and dry  - Evaluate need for skin moisturizer/barrier cream  - Collaborate with interdisciplinary team   - Patient/family teaching  - Consider wound care consult   Outcome: Progressing     Problem: PAIN - ADULT  Goal: Verbalizes/displays adequate comfort level or baseline comfort level  Description: Interventions:  - Encourage patient to monitor pain and request assistance  - Assess pain using appropriate pain scale  - Administer analgesics based on type and severity of pain and evaluate response  - Implement non-pharmacological measures as appropriate and evaluate response  - Consider cultural and social influences on pain and pain management  - Notify physician/advanced practitioner if interventions unsuccessful or patient reports new pain  Outcome: Progressing     Problem: INFECTION - ADULT  Goal: Absence or prevention of progression during hospitalization  Description: INTERVENTIONS:  - Assess and monitor for signs and symptoms of infection  - Monitor lab/diagnostic results  - Monitor all insertion sites, i.e. indwelling lines, tubes, and drains  - Monitor endotracheal if appropriate and nasal secretions for changes in amount and color  - Oriska appropriate cooling/warming therapies per order  - Administer medications as ordered  - Instruct and encourage patient and family to use good hand hygiene technique  - Identify and instruct in appropriate isolation precautions for  identified infection/condition  Outcome: Progressing  Goal: Absence of fever/infection during neutropenic period  Description: INTERVENTIONS:  - Monitor WBC    Outcome: Progressing     Problem: SAFETY ADULT  Goal: Patient will remain free of falls  Description: INTERVENTIONS:  - Educate patient/family on patient safety including physical limitations  - Instruct patient to call for assistance with activity   - Consult OT/PT to assist with strengthening/mobility   - Keep Call bell within reach  - Keep bed low and locked with side rails adjusted as appropriate  - Keep care items and personal belongings within reach  - Initiate and maintain comfort rounds  - Make Fall Risk Sign visible to staff  - Offer Toileting every 2 Hours, in advance of need  - Initiate/Maintain bed alarm  - Obtain necessary fall risk management equipment  - Apply yellow socks and bracelet for high fall risk patients  - Consider moving patient to room near nurses station  Outcome: Progressing  Goal: Maintain or return to baseline ADL function  Description: INTERVENTIONS:  -  Assess patient's ability to carry out ADLs; assess patient's baseline for ADL function and identify physical deficits which impact ability to perform ADLs (bathing, care of mouth/teeth, toileting, grooming, dressing, etc.)  - Assess/evaluate cause of self-care deficits   - Assess range of motion  - Assess patient's mobility; develop plan if impaired  - Assess patient's need for assistive devices and provide as appropriate  - Encourage maximum independence but intervene and supervise when necessary  - Involve family in performance of ADLs  - Assess for home care needs following discharge   - Consider OT consult to assist with ADL evaluation and planning for discharge  - Provide patient education as appropriate  Outcome: Progressing  Goal: Maintains/Returns to pre admission functional level  Description: INTERVENTIONS:  - Perform AM-PAC 6 Click Basic Mobility/ Daily Activity  assessment daily.  - Set and communicate daily mobility goal to care team and patient/family/caregiver.   - Collaborate with rehabilitation services on mobility goals if consulted  - Perform Range of Motion 4 times a day.  - Reposition patient every 2 hours.  - Dangle patient 4 times a day  - Stand patient 4 times a day  - Ambulate patient 3 times a day  - Out of bed to chair 3 times a day   - Out of bed for meals 3 times a day  - Out of bed for toileting  - Record patient progress and toleration of activity level   Outcome: Progressing     Problem: DISCHARGE PLANNING  Goal: Discharge to home or other facility with appropriate resources  Description: INTERVENTIONS:  - Identify barriers to discharge w/patient and caregiver  - Arrange for needed discharge resources and transportation as appropriate  - Identify discharge learning needs (meds, wound care, etc.)  - Arrange for interpretive services to assist at discharge as needed  - Refer to Case Management Department for coordinating discharge planning if the patient needs post-hospital services based on physician/advanced practitioner order or complex needs related to functional status, cognitive ability, or social support system  Outcome: Progressing     Problem: Knowledge Deficit  Goal: Patient/family/caregiver demonstrates understanding of disease process, treatment plan, medications, and discharge instructions  Description: Complete learning assessment and assess knowledge base.  Interventions:  - Provide teaching at level of understanding  - Provide teaching via preferred learning methods  Outcome: Progressing     Problem: SAFETY,RESTRAINT: NV/NON-SELF DESTRUCTIVE BEHAVIOR  Goal: Remains free of harm/injury (restraint for non violent/non self-detsructive behavior)  Description: INTERVENTIONS:  - Instruct patient/family regarding restraint use   - Assess and monitor physiologic and psychological status   - Provide interventions and comfort measures to meet  assessed patient needs   - Identify and implement measures to help patient regain control  - Assess readiness for release of restraint   Outcome: Progressing  Goal: Returns to optimal restraint-free functioning  Description: INTERVENTIONS:  - Assess the patient's behavior and symptoms that indicate continued need for restraint  - Identify and implement measures to help patient regain control  - Assess readiness for release of restraint   Outcome: Progressing     Problem: Nutrition/Hydration-ADULT  Goal: Nutrient/Hydration intake appropriate for improving, restoring or maintaining nutritional needs  Description: Monitor and assess patient's nutrition/hydration status for malnutrition. Collaborate with interdisciplinary team and initiate plan and interventions as ordered.  Monitor patient's weight and dietary intake as ordered or per policy. Utilize nutrition screening tool and intervene as necessary. Determine patient's food preferences and provide high-protein, high-caloric foods as appropriate.     INTERVENTIONS:  - Monitor oral intake, urinary output, labs, and treatment plans  - Assess nutrition and hydration status and recommend course of action  - Evaluate amount of meals eaten  - Assist patient with eating if necessary   - Allow adequate time for meals  - Recommend/ encourage appropriate diets, oral nutritional supplements, and vitamin/mineral supplements  - Order, calculate, and assess calorie counts as needed  - Recommend, monitor, and adjust tube feedings and TPN/PPN based on assessed needs  - Assess need for intravenous fluids  - Provide specific nutrition/hydration education as appropriate  - Include patient/family/caregiver in decisions related to nutrition  Outcome: Progressing

## 2024-11-29 ENCOUNTER — TRANSITIONAL CARE MANAGEMENT (OUTPATIENT)
Dept: FAMILY MEDICINE CLINIC | Facility: CLINIC | Age: 84
End: 2024-11-29

## 2024-11-29 ENCOUNTER — TELEPHONE (OUTPATIENT)
Age: 84
End: 2024-11-29

## 2024-11-29 NOTE — TELEPHONE ENCOUNTER
Radha, relative of pt, called in returning missed call from office to schedule pt TCM.     Radha didn't have time to make appt b/c she is at work.     Radha reports pt had broken hip, and recently had PT, in which she was not able to stand or walk. Making it difficult to transport pt.     Radha inquires if it is possible to schedule TCM as a virtual appt?    Please advise & call Radha back to schedule TCM. Thank you!    Radha Valverde: [phone]

## 2024-11-29 NOTE — TELEPHONE ENCOUNTER
Rec'd call from patient's relative/caregiver, Radha requesting that the prescription for terbinafine cream be sent in ASAP. Patient's upcoming appt is on 12/12.    Please advise. Thank you.

## 2024-12-02 ENCOUNTER — TELEPHONE (OUTPATIENT)
Dept: OBGYN CLINIC | Facility: HOSPITAL | Age: 84
End: 2024-12-02

## 2024-12-02 DIAGNOSIS — B35.3 TINEA PEDIS OF BOTH FEET: Primary | ICD-10-CM

## 2024-12-02 RX ORDER — PRENATAL VIT 91/IRON/FOLIC/DHA 28-975-200
COMBINATION PACKAGE (EA) ORAL 2 TIMES DAILY
Qty: 42 G | Refills: 0 | Status: SHIPPED | OUTPATIENT
Start: 2024-12-02

## 2024-12-02 NOTE — TELEPHONE ENCOUNTER
Caller: Radha (Care Giver)    Doctor: Kell    Reason for call: OPEN REDUCTION INTERAL FIXATION INTERTROCHANTERIC FEMUR FRACTURE WITH IM NAIL (Right: Leg Upper)  Patient was discharged on 11/27/24 and is to follow up in two weeks for PO.   Kell has nothing and I tried to look up ALL the PA's that help him and none have a schedule.     Thursdays and Fridays do not work for the family either, and due to her dementia they are asking for something after 2 pm. Stating patient does not corroborate in the AM and they will not be able to get her there.    [facility] would be first preference.     Call back#: [phone]

## 2024-12-02 NOTE — TELEPHONE ENCOUNTER
Called and left message for Radha (emergency contact) to clarify what questions she had and advised to call back or send MYCHART message whatever is easier.

## 2024-12-02 NOTE — TELEPHONE ENCOUNTER
Caller: Radha (Relative)    Doctor: Kell    Reason for call: Relative  has some bandage questions concerning changing, looking like the one is pulling up, and in case she pees on it etc. Please call to discuss.     Call back#: [phone] She said she you can Mychart message her since she cannot always get to the phone. I advised nurses they will call her because they are going to have more questions. Relative verbalized understanding.

## 2024-12-04 NOTE — UTILIZATION REVIEW
NOTIFICATION OF ADMISSION DISCHARGE   This is a Notification of Discharge from [facility]. Please be advised that this patient has been discharge from our facility. Below you will find the admission and discharge date and time including the patient’s disposition.   UTILIZATION REVIEW CONTACT:  Jeff Ferguson  Utilization   Network Utilization Review Department  Phone: [phone] x carefully listen to the prompts. All voicemails are confidential.  Email: [email or web address]     ADMISSION INFORMATION  PRESENTATION DATE: 11/22/2024 12:21 PM  OBERVATION ADMISSION DATE: N/A  INPATIENT ADMISSION DATE: 11/22/24  2:02 PM   DISCHARGE DATE: 11/27/2024  3:01 PM   DISPOSITION:Home with Home Health Care    Network Utilization Review Department  ATTENTION: Please call with any questions or concerns to [phone] and carefully listen to the prompts so that you are directed to the right person. All voicemails are confidential.   For Discharge needs, contact Care Management DC Support Team at [phone] opt. 2  Send all requests for admission clinical reviews, approved or denied determinations and any other requests to dedicated fax number below belonging to the campus where the patient is receiving treatment. List of dedicated fax numbers for the Facilities:  FACILITY NAME UR FAX NUMBER   ADMISSION DENIALS (Administrative/Medical Necessity) [phone]   DISCHARGE SUPPORT TEAM ([facility]) [phone]   PARENT CHILD HEALTH (Maternity/NICU/Pediatrics) [phone]   [facility] [phone]   [facility] [phone]   [facility] [phone]   [facility] [phone]   [facility] [phone]   [facility] [phone]   [facility] [phone]   [facility]  [phone]   [facility] [phone]   [facility] [phone]   [facility] [phone]   [facility] [phone]

## 2024-12-05 ENCOUNTER — TELEMEDICINE (OUTPATIENT)
Dept: FAMILY MEDICINE CLINIC | Facility: CLINIC | Age: 84
End: 2024-12-05
Payer: MEDICARE

## 2024-12-05 DIAGNOSIS — Z78.9 TRANSITION OF CARE: ICD-10-CM

## 2024-12-05 DIAGNOSIS — S72.8X1A OTHER CLOSED FRACTURE OF RIGHT FEMUR, UNSPECIFIED PORTION OF FEMUR, INITIAL ENCOUNTER (HCC): Primary | ICD-10-CM

## 2024-12-05 DIAGNOSIS — R93.7 ABNORMAL CT SCAN, CERVICAL SPINE: ICD-10-CM

## 2024-12-05 PROCEDURE — 99495 TRANSJ CARE MGMT MOD F2F 14D: CPT | Performed by: FAMILY MEDICINE

## 2024-12-05 NOTE — ASSESSMENT & PLAN NOTE
Reviewed results as below, discussed recommendation for MRI to further evaluate -- will hold off until pt is further along in recovery

## 2024-12-05 NOTE — ASSESSMENT & PLAN NOTE
Reviewed hospitalization as below. Overall doing well since discharge. Continue PT/OT/VNA services. F/u with Ortho as scheduled.

## 2024-12-05 NOTE — PROGRESS NOTES
Virtual TCM Visit:    Verification of patient location:    Patient is located at Home in the following state in which I hold an active license [place]    Assessment & Plan  Other closed fracture of right femur, unspecified portion of femur, initial encounter ([facility])  Reviewed hospitalization as below. Overall doing well since discharge. Continue PT/OT/VNA services. F/u with Ortho as scheduled.        Abnormal CT scan, cervical spine  Reviewed results as below, discussed recommendation for MRI to further evaluate -- will hold off until pt is further along in recovery       Transition of care                Encounter provider Aline Vincent DO     Provider located at [facility]  [address]  [facility] [address]    After connecting through Kustom[employer], the patient was identified by name and date of birth. Suri Patel was informed that this is a telemedicine visit and that the visit is being conducted through the Epic Embedded platform. She agrees to proceed..  My office door was closed. No one else was in the room.  She acknowledged consent and understanding of privacy and security of the video platform. The patient has agreed to participate and understands they can discontinue the visit at any time.    Patient is aware this is a billable service.    History of Present Illness     Transitional Care Management Review:   Suri Patel is a 84 y.o. female here for TCM follow up.    During the TCM phone call patient stated:  TCM Call       Date and time call was made  12/2/2024  1:09 PM    Hospital care reviewed  Records reviewed    Patient was hospitialized at  [facility]    Date of Admission  11/22/24    Date of discharge  11/27/24    Diagnosis  Femur fracture, right    Disposition  Home; Home health services    Were the patients medications reviewed and updated  No    Current Symptoms  Weakness; Leg pain - right side    Right side leg pain severity  Mild    Weakness  severity  Mild          TCM Call       Post hospital issues  Reduced activity    Should patient be enrolled in anticoag monitoring?  No    Scheduled for follow up?  Yes    Did you obtain your prescribed medications  Yes    Do you need help managing your prescriptions or medications  No    Is transportation to your appointment needed  No    I have advised the patient to call PCP with any new or worsening symptoms  ROCKY HUTTON          HPI    Pt presents with domenico for hospital follow up s/p admission to [facility] from 11/22-11/27. Pt presented s/p fall and was found to have R femur fracture. She underwent ORIF and was discharged with home care. Lovenox x4 weeks   EKG NSR, non-specific ST/T wave changes; Trop (+) -- Cardio consulted, felt to be secondary to stress from fracture  CXR benign   XR R Femur: Acute proximal fracture with moderate varus angulation  CT Head: No acute process; mild microangiopathic changes   CT C-Spine: No acute process; chronic T4 compression, scattered lucencies in C-spine (recommended MRI w/ w/o)  CT CAP: Comminuted intertrochanteric R femoral fracture, healing subacute-to-chronic L sacral insufficiency fracture, healed L lateral 10th rib fracture; LLL atelectasis, stable R renal cyst, constipation  Kidney function stable; Bili 1.09 (chronic), otherwise LFTs WNL   WBC 12 --> 7; Hb 11.8--> 9.9 (baljit) --> 10.3   Also had Derm consult for chronic LE rash -- prescribed terbinafine ointment     Today:   Seems to be doing well   Has been building strength -- will stand next to her bed, starting to walk a bit more with walker   Has VNA once weekly/PT twice weekly   Appetite is good   Continues to have constipation -- taking stool softener, MoM, cary     Has f/u with Ortho 12/11 and virtual with Derm 12/12     Review of Systems   Constitutional:  Negative for appetite change.   Gastrointestinal:  Positive for constipation.     Objective   There were no vitals taken for this visit.    Physical  Exam  Vitals and nursing note reviewed.   Constitutional:       General: She is not in acute distress.     Appearance: Normal appearance.   Pulmonary:      Effort: Pulmonary effort is normal. No respiratory distress.   Neurological:      Mental Status: She is alert. Mental status is at baseline.   Psychiatric:         Mood and Affect: Mood normal.       Medications have been reviewed by provider in current encounter      Aline Vincent, DO      VIRTUAL VISIT DISCLAIMER    Suri Patel verbally agrees to participate in Virtual Care Services. Pt is aware that Virtual Care Services could be limited without vital signs or the ability to perform a full hands-on physical exam. Suri Patel understands she or the provider may request at any time to terminate the video visit and request the patient to seek care or treatment in person.

## 2024-12-11 ENCOUNTER — APPOINTMENT (OUTPATIENT)
Dept: RADIOLOGY | Facility: CLINIC | Age: 84
End: 2024-12-11
Payer: MEDICARE

## 2024-12-11 ENCOUNTER — OFFICE VISIT (OUTPATIENT)
Dept: OBGYN CLINIC | Facility: CLINIC | Age: 84
End: 2024-12-11

## 2024-12-11 VITALS
HEART RATE: 69 BPM | BODY MASS INDEX: 19.2 KG/M2 | DIASTOLIC BLOOD PRESSURE: 57 MMHG | HEIGHT: 69 IN | SYSTOLIC BLOOD PRESSURE: 92 MMHG

## 2024-12-11 DIAGNOSIS — Z48.89 AFTERCARE FOLLOWING SURGERY: Primary | ICD-10-CM

## 2024-12-11 DIAGNOSIS — Z48.89 AFTERCARE FOLLOWING SURGERY: ICD-10-CM

## 2024-12-11 LAB
DME PARACHUTE DELIVERY DATE ACTUAL: NORMAL
DME PARACHUTE DELIVERY DATE ACTUAL: NORMAL
DME PARACHUTE DELIVERY DATE EXPECTED: NORMAL
DME PARACHUTE DELIVERY DATE EXPECTED: NORMAL
DME PARACHUTE DELIVERY DATE REQUESTED: NORMAL
DME PARACHUTE DELIVERY DATE REQUESTED: NORMAL
DME PARACHUTE ITEM DESCRIPTION: NORMAL
DME PARACHUTE ORDER STATUS: NORMAL
DME PARACHUTE ORDER STATUS: NORMAL
DME PARACHUTE SUPPLIER NAME: NORMAL
DME PARACHUTE SUPPLIER NAME: NORMAL
DME PARACHUTE SUPPLIER PHONE: NORMAL
DME PARACHUTE SUPPLIER PHONE: NORMAL

## 2024-12-11 PROCEDURE — 99024 POSTOP FOLLOW-UP VISIT: CPT | Performed by: ORTHOPAEDIC SURGERY

## 2024-12-11 PROCEDURE — 73552 X-RAY EXAM OF FEMUR 2/>: CPT

## 2024-12-11 NOTE — PROGRESS NOTES
"Patient Name:  Suri Patel  MRN:  [id number]    Assessment & Plan     1. Aftercare following surgery  -     XR femur 2 vw right; Future; Expected date: 12/11/2024      Approximately 2 week s/p Right femur IM nail insertion performed on 11/23/2024  X-rays reviewed with patient  Pinon removed without complications   Overall, patient doing well s/p surgical intervention  At this time, patient should continue outpatient PT to work on gait and strengthening  Follow up 8 weeks with new Right hip x-rays upon arrival    History of the Present Illness   Suri Patel is a 84 y.o. female approximately 2 week s/p Right femur IM nail insertion performed on 11/23/2024. Today, patient's care givers reoprt she is doing well. They admit her incisions look great and she is trying to ambulate with a walker.        Review of Systems     Review of Systems   Constitutional:  Negative for chills and fever.   HENT:  Negative for congestion.    Respiratory:  Negative for cough, chest tightness and shortness of breath.    Cardiovascular:  Negative for chest pain and palpitations.   Gastrointestinal:  Negative for abdominal pain.   Endocrine: Negative for cold intolerance and heat intolerance.   Neurological:  Negative for syncope.   Psychiatric/Behavioral:  Negative for confusion.        Physical Exam     BP 92/57   Pulse 69   Ht 5' 9\" (1.753 m)   BMI 19.20 kg/m²     Right Hip:  Surgical incisions well healing. Staples intact.  Range of motion 90 degrees hip flexion  No pain with passive internal or external rotation  able to perform straight leg raise  negative log roll  Sensation intact to L2-S1 dermatomes   Extremity warm and well perfused       Data Review     I have personally reviewed pertinent films in PACS, and my interpretation follows.    X-rays taken 12/11/2024 of Right hip independently reviewed and demonstrate maintenance of intertrochanteric fracture with orthopaedic hardware intact without evidence of failure.  "     Social History     Tobacco Use    Smoking status: Never    Smokeless tobacco: Never   Vaping Use    Vaping status: Never Used   Substance Use Topics    Alcohol use: Not Currently    Drug use: No           Procedures  None     Breanne Ruiz PA-C

## 2024-12-12 ENCOUNTER — OFFICE VISIT (OUTPATIENT)
Dept: DERMATOLOGY | Facility: CLINIC | Age: 84
End: 2024-12-12
Payer: MEDICARE

## 2024-12-12 ENCOUNTER — TELEPHONE (OUTPATIENT)
Age: 84
End: 2024-12-12

## 2024-12-12 DIAGNOSIS — R21 RASH: Primary | ICD-10-CM

## 2024-12-12 PROCEDURE — 99214 OFFICE O/P EST MOD 30 MIN: CPT | Performed by: STUDENT IN AN ORGANIZED HEALTH CARE EDUCATION/TRAINING PROGRAM

## 2024-12-12 RX ORDER — CICLOPIROX OLAMINE 7.7 MG/G
CREAM TOPICAL
Qty: 90 G | Refills: 7 | Status: SHIPPED | OUTPATIENT
Start: 2024-12-12

## 2024-12-12 NOTE — PROGRESS NOTES
"[facility] VISIT Note     Patient Name: Suri Patel  Encounter Date: 12/12/2024     Have you been cared for by a [facility] Dermatologist in the last 3 years and, if so, which description applies to you?    Yes.  I have been here within the last 3 years, and my medical history has NOT changed since that time.  I am FEMALE/of child-bearing potential.    REVIEW OF SYSTEMS:  Have you recently had or currently have any of the following? No changes in my recent health.   PAST MEDICAL HISTORY:  Have you personally ever had or currently have any of the following?  If \"YES,\" then please provide more detail. No changes in my medical history.   HISTORY OF IMMUNOSUPPRESSION: Do you have a history of any of the following:  Systemic Immunosuppression such as Diabetes, Biologic or Immunotherapy, Chemotherapy, Organ Transplantation, Bone Marrow Transplantation or Prednisone?  No     Answering \"YES\" requires the addition of the dotphrase \"IMMUNOSUPPRESSED\" as the first diagnosis of the patient's visit.   FAMILY HISTORY:  Any \"first degree relatives\" (parent, brother, sister, or child) with the following?    No changes in my family's known health.   PATIENT EXPERIENCE:    Do you want the Dermatologist to perform a COMPLETE skin exam today including a clinical examination under the \"bra and underwear\" areas?  NO  If necessary, do we have your permission to call and leave a detailed message on your Preferred Phone number that includes your specific medical information?  Yes      Allergies   Allergen Reactions    Dye [Iodinated Contrast Media] Tachycardia    Prednisone Other (See Comments)     Insomnia, Delirium/Anger -- was given oral steroid for rash while hospitalized, required 1:1 and restraints       Current Outpatient Medications:     enoxaparin (Lovenox) 40 mg/0.4 mL, Inject 0.4 mL (40 mg total) under the skin in the morning for 27 days, Disp: 10.8 mL, Rfl: 0    Incontinence Supplies (Wings Incontinence " Pants S/M) MISC, Use 6 (six) times a day, Disp: 200 each, Rfl: 5    senna-docusate sodium (SENOKOT-S) 8.6-50 mg per tablet, Take 1 tablet by mouth daily, Disp: 30 tablet, Rfl: 0    terbinafine (LamISIL) 1 % cream, Apply topically 2 (two) times a day Apply twice daily to the affected areas on the lower extremities for 2 weeks, Disp: 42 g, Rfl: 0    clotrimazole-betamethasone (LOTRISONE) 1-0.05 % cream, Apply topically 2 (two) times a day (Patient not taking: Reported on 12/12/2024), Disp: 90 g, Rfl: 1          Whom besides the patient is providing clinical information about today's encounter?   Spouse/Guardian provided history (patient has dementia)    Physical Exam and Assessment/Plan by Diagnosis:        RASH: scaly red plaques on bilateral lower legs    Physical Exam:  (Anatomic Location); (Size and Morphological Description); (Differential Diagnosis):  Scaly red plaques on bilateral lower legs  Pertinent Positives:  Pertinent Negatives:    Additional History of Present Condition:  patient is doing a lot better on the OTC terbinafine 1% cream. Has been using the cream for over one week. Has been applied once a day. Biopsy shows hypertrophic lichen planus, but rash did not improve with triamcinolone 0.1% cream (actually rash worsened). Also, reports mood became worse with topical steroids    Assessment and Plan:  Based on a thorough discussion of this condition and the management approach to it (including a comprehensive discussion of the known risks, side effects and potential benefits of treatment), the patient (family) agrees to implement the following specific plan:  Ciclopirox 0.77% cream 2x/day prescribed--Apply 2x/day to area  If does not improve, can use OTC terbinafine cream      Scribe Attestation      I,:  Nichole Gill MA am acting as a scribe while in the presence of the attending physician.:       I,:  Daysi Palma MD personally performed the services described in this documentation    as scribed in  my presence.:               Virtual Regular Visit  Name: Suri Patel      : 1940      MRN: [id number]  Encounter Provider: TONA HORTON  Encounter Date: 2024   Encounter department: [facility] [place]      Verification of patient location:  Patient is located at Home in the following state in which I hold an active license [place] :  Assessment & Plan        Encounter provider TONA HORTON    The patient was identified by name and date of birth. Suri Patel was informed that this is a telemedicine visit and that the visit is being conducted through  doximity ..  My office door was closed. The patient was notified the following individuals were present in the room LIUDMILA Watkins.  She acknowledged consent and understanding of privacy and security of the video platform. The patient has agreed to participate and understands they can discontinue the visit at any time.    Patient is aware this is a billable service.     History of Present Illness     HPI  Review of Systems    Objective   There were no vitals taken for this visit.    Physical Exam    Visit Time  Total Visit Duration: 5 min

## 2024-12-12 NOTE — PATIENT INSTRUCTIONS
RASH: scaly red plaques on bilateral lower legs    Assessment and Plan:  Based on a thorough discussion of this condition and the management approach to it (including a comprehensive discussion of the known risks, side effects and potential benefits of treatment), the patient (family) agrees to implement the following specific plan:  Ciclopirox 0.77% cream 2x/day prescribed--Apply 2x/day to area  If does not improve, can use OTC terbinafine cream

## 2024-12-12 NOTE — TELEPHONE ENCOUNTER
Received call from Radha, Relative,  on behalf of Patient, who was experiencing difficulty logging into Virtual Visit. Link for virtual visit kept disappearing in chris.     Informed  of Ambassador that patient experiencing difficulty logging in.      Radha's call dropped..

## 2025-01-29 ENCOUNTER — OFFICE VISIT (OUTPATIENT)
Dept: OBGYN CLINIC | Facility: CLINIC | Age: 85
End: 2025-01-29

## 2025-01-29 ENCOUNTER — APPOINTMENT (OUTPATIENT)
Dept: RADIOLOGY | Facility: CLINIC | Age: 85
End: 2025-01-29
Payer: MEDICARE

## 2025-01-29 VITALS — WEIGHT: 130 LBS | RESPIRATION RATE: 18 BRPM | BODY MASS INDEX: 19.26 KG/M2 | HEIGHT: 69 IN

## 2025-01-29 DIAGNOSIS — Z48.89 AFTERCARE FOLLOWING SURGERY: Primary | ICD-10-CM

## 2025-01-29 DIAGNOSIS — Z48.89 AFTERCARE FOLLOWING SURGERY: ICD-10-CM

## 2025-01-29 PROCEDURE — 73502 X-RAY EXAM HIP UNI 2-3 VIEWS: CPT

## 2025-01-29 PROCEDURE — 99024 POSTOP FOLLOW-UP VISIT: CPT | Performed by: ORTHOPAEDIC SURGERY

## 2025-01-29 NOTE — PROGRESS NOTES
"Patient Name:  Suri Patel  MRN:  [id number]    Assessment & Plan     1. Aftercare following surgery  -     XR hip/pelv 2-3 vws right if performed; Future; Expected date: 01/29/2025      Approximately 9.5 week s/p Right femur IM nail insertion performed on 11/23/2024  X-rays reviewed with patient  Overall, the patient is doing well s/p operative management  Recommend WB activity as tolerated  No activity restrictions with regards to her hip  Follow up as needed    History of the Present Illness   Suri Patel is a 84 y.o. female approximately 9.5 week s/p Right femur IM nail insertion performed on 11/23/2024. Today, patient's care givers reoprt she is doing well. She has been ambulating well with and without a walker. Today patient reports in wheelchair. She reports no pain today.        Review of Systems     Review of Systems   Constitutional:  Negative for chills and fever.   HENT:  Negative for congestion.    Respiratory:  Negative for cough, chest tightness and shortness of breath.    Cardiovascular:  Negative for chest pain and palpitations.   Gastrointestinal:  Negative for abdominal pain.   Endocrine: Negative for cold intolerance and heat intolerance.   Neurological:  Negative for syncope.   Psychiatric/Behavioral:  Negative for confusion.        Physical Exam     Resp 18   Ht 5' 9\" (1.753 m)   Wt 59 kg (130 lb)   BMI 19.20 kg/m²     Right Hip:  Surgical incisions well healing  Range of motion 90 degrees hip flexion  No pain with gentle passive internal or external rotation  able to perform straight leg raise  negative log roll  Sensation intact to L2-S1 dermatomes   Extremity warm and well perfused       Data Review     I have personally reviewed pertinent films in PACS, and my interpretation follows.    X-rays taken 1/29/2025 of Right hip independently reviewed and demonstrate orthopaedic hardware intact without evidence of failure.      Social History     Tobacco Use   • Smoking status: Never   • " Smokeless tobacco: Never   Vaping Use   • Vaping status: Never Used   Substance Use Topics   • Alcohol use: Not Currently   • Drug use: No           Procedures  None     Yany Esparza   Scribe Attestation    I,:  Yany Esparza am acting as a scribe while in the presence of the attending physician.:       I,:  Kang Castorena, DO personally performed the services described in this documentation    as scribed in my presence.:

## 2025-01-31 ENCOUNTER — HOSPITAL ENCOUNTER (EMERGENCY)
Facility: HOSPITAL | Age: 85
Discharge: HOME | End: 2025-01-31
Attending: EMERGENCY MEDICINE
Payer: MEDICARE

## 2025-01-31 VITALS
DIASTOLIC BLOOD PRESSURE: 77 MMHG | RESPIRATION RATE: 22 BRPM | SYSTOLIC BLOOD PRESSURE: 149 MMHG | OXYGEN SATURATION: 99 % | HEART RATE: 85 BPM | TEMPERATURE: 97.6 F

## 2025-01-31 DIAGNOSIS — N93.9 VAGINAL BLEEDING: Primary | ICD-10-CM

## 2025-01-31 LAB
ALBUMIN SERPL BCG-MCNC: 4.2 G/DL (ref 3.5–5)
ALP SERPL-CCNC: 129 U/L (ref 34–104)
ALT SERPL W P-5'-P-CCNC: 35 U/L (ref 7–52)
ANION GAP SERPL CALCULATED.3IONS-SCNC: 6 MMOL/L (ref 4–13)
AST SERPL W P-5'-P-CCNC: 36 U/L (ref 13–39)
BASOPHILS # BLD AUTO: 0.02 THOUSANDS/ΜL (ref 0–0.1)
BASOPHILS NFR BLD AUTO: 0 % (ref 0–1)
BILIRUB SERPL-MCNC: 0.53 MG/DL (ref 0.2–1)
BUN SERPL-MCNC: 38 MG/DL (ref 5–25)
CALCIUM SERPL-MCNC: 10.1 MG/DL (ref 8.4–10.2)
CHLORIDE SERPL-SCNC: 107 MMOL/L (ref 96–108)
CO2 SERPL-SCNC: 25 MMOL/L (ref 21–32)
CREAT SERPL-MCNC: 0.6 MG/DL (ref 0.6–1.3)
EOSINOPHIL # BLD AUTO: 0.03 THOUSAND/ΜL (ref 0–0.61)
EOSINOPHIL NFR BLD AUTO: 1 % (ref 0–6)
ERYTHROCYTE [DISTWIDTH] IN BLOOD BY AUTOMATED COUNT: 14.6 % (ref 11.6–15.1)
GFR SERPL CREATININE-BSD FRML MDRD: 82 ML/MIN/1.73SQ M
GLUCOSE SERPL-MCNC: 98 MG/DL (ref 65–140)
HCT VFR BLD AUTO: 38.4 % (ref 34.8–46.1)
HGB BLD-MCNC: 13.1 G/DL (ref 11.5–15.4)
IMM GRANULOCYTES # BLD AUTO: 0.03 THOUSAND/UL (ref 0–0.2)
IMM GRANULOCYTES NFR BLD AUTO: 1 % (ref 0–2)
LYMPHOCYTES # BLD AUTO: 1.25 THOUSANDS/ΜL (ref 0.6–4.47)
LYMPHOCYTES NFR BLD AUTO: 24 % (ref 14–44)
MCH RBC QN AUTO: 34.3 PG (ref 26.8–34.3)
MCHC RBC AUTO-ENTMCNC: 34.1 G/DL (ref 31.4–37.4)
MCV RBC AUTO: 101 FL (ref 82–98)
MONOCYTES # BLD AUTO: 0.59 THOUSAND/ΜL (ref 0.17–1.22)
MONOCYTES NFR BLD AUTO: 11 % (ref 4–12)
NEUTROPHILS # BLD AUTO: 3.27 THOUSANDS/ΜL (ref 1.85–7.62)
NEUTS SEG NFR BLD AUTO: 63 % (ref 43–75)
NRBC BLD AUTO-RTO: 0 /100 WBCS
PLATELET # BLD AUTO: 198 THOUSANDS/UL (ref 149–390)
PMV BLD AUTO: 9.9 FL (ref 8.9–12.7)
POTASSIUM SERPL-SCNC: 4.7 MMOL/L (ref 3.5–5.3)
PROT SERPL-MCNC: 7.2 G/DL (ref 6.4–8.4)
RBC # BLD AUTO: 3.82 MILLION/UL (ref 3.81–5.12)
SODIUM SERPL-SCNC: 138 MMOL/L (ref 135–147)
WBC # BLD AUTO: 5.19 THOUSAND/UL (ref 4.31–10.16)

## 2025-01-31 PROCEDURE — 99284 EMERGENCY DEPT VISIT MOD MDM: CPT

## 2025-01-31 PROCEDURE — 36415 COLL VENOUS BLD VENIPUNCTURE: CPT

## 2025-01-31 PROCEDURE — 80053 COMPREHEN METABOLIC PANEL: CPT

## 2025-01-31 PROCEDURE — 85025 COMPLETE CBC W/AUTO DIFF WBC: CPT

## 2025-01-31 NOTE — ED PROVIDER NOTES
Time reflects when diagnosis was documented in both MDM as applicable and the Disposition within this note       Time User Action Codes Description Comment    1/31/2025  4:55 PM [email or web address] [email or web address] Add [N93.9] Vaginal bleeding           ED Disposition       ED Disposition   Discharge    Condition   Stable    Date/Time   Fri Jan 31, 2025  4:54 PM    Comment   Suri Patel discharge to home/self care.                   Assessment & Plan       Medical Decision Making  Vital signs stable.  Afebrile.  Patient is well-appearing.  No abdominal tenderness to palpation lungs are clear to auscultation.  Heart rate is regular rate and rhythm.  Guaiac stool testing was negative for Hemoccult.  Upon examination of patient's diaper, the bleeding appears to be from a vaginal source.     Ordered CBC and CMP.  No leukocytosis or anemia.    Advised to f/u with OB/GYN for further evaluation and ultrasound of uterus.  Ambulatory referral placed. Case discussed with Dr. Rosario. Advised patient to return with any new or worsening symptoms including but not limited to worsening or uncontrolled bleeding, shortness of breath, feeling lightheaded, chest tightness, abdominal cramping, severe abdominal pain, fevers,vomiting, or for any other concerning symptoms. Discussed assessment and plan with patient who verbalizes understanding and agrees with plan.      Amount and/or Complexity of Data Reviewed  Labs: ordered. Decision-making details documented in ED Course.        ED Course as of 01/31/25 2049 Fri Jan 31, 2025   1513 Blood Pressure: 143/63   1513 Temperature: 97.6 °F (36.4 °C)   1513 Pulse: 68   1513 Respirations: 16   1513 SpO2: 95 %   1552 Guaiac negative stool. On examination of diaper, bleeding appears to be vaginal source.   1653 CBC and differential(!)  No anemia or leukocytosis.   1654 Comprehensive metabolic panel(!)       Medications - No data to display    ED Risk Strat Scores                                               History of Present Illness       Chief Complaint   Patient presents with    Rectal Bleeding     Pt c/o blood in stool.        Past Medical History:   Diagnosis Date    Cellulitis of right lower extremity 2022    Dementia (HCC)     Memory loss 2018    Mild protein-calorie malnutrition (HCC)  2021      Past Surgical History:   Procedure Laterality Date    ANKLE FRACTURE SURGERY  1996    HIP SURGERY Right 2024    ORIF-NERY    FL OPTX FEM SHFT FX W/INSJ IMED IMPLT W/WO SCREW Right 2024    Procedure: OPEN REDUCTION INTERAL FIXATION INTERTROCHANTERIC FEMUR FRACTURE WITH IM NAIL;  Surgeon: Kang Castorena DO;  Location: [facility];  Service: Orthopedics      Family History   Problem Relation Age of Onset    Dementia Sister         Suri's sister is     Cancer Father       Social History     Tobacco Use    Smoking status: Never    Smokeless tobacco: Never   Vaping Use    Vaping status: Never Used   Substance Use Topics    Alcohol use: Not Currently    Drug use: No      E-Cigarette/Vaping    E-Cigarette Use Never User       E-Cigarette/Vaping Substances    Nicotine No     THC No     CBD No     Flavoring No     Other No     Unknown No       I have reviewed and agree with the history as documented.     Patient is a 84-year-old female who presents with her family complaining of bright red blood in the commode onset today.  Patient's caretaker states that the patient has had some spotting in her diaper over the last 1 to 2 weeks.  Today after the patient use the commode family mother member noticed bright red blood in the toilet.  Patient's family member denies any fever, chills, recent illnesses, complaints of pain, history of same, or any other symptoms at this time.      History provided by:  Patient   used: No        Review of Systems   Constitutional: Negative.  Negative for chills and fever.   HENT: Negative.  Negative for ear pain, rhinorrhea and sore throat.     Eyes: Negative.  Negative for pain and visual disturbance.   Respiratory: Negative.  Negative for cough and shortness of breath.    Cardiovascular: Negative.  Negative for chest pain, palpitations and leg swelling.   Gastrointestinal:  Negative for abdominal pain, diarrhea, nausea and vomiting.        Positive for bright red blood in the commode.   Genitourinary:  Negative for dysuria, frequency and hematuria.   Musculoskeletal: Negative.  Negative for back pain.   Skin: Negative.  Negative for color change and rash.   Neurological: Negative.  Negative for syncope.   All other systems reviewed and are negative.          Objective       ED Triage Vitals   Temperature Pulse Blood Pressure Respirations SpO2 Patient Position - Orthostatic VS   01/31/25 1505 01/31/25 1505 01/31/25 1505 01/31/25 1505 01/31/25 1505 01/31/25 1600   97.6 °F (36.4 °C) 68 143/63 16 95 % Sitting      Temp Source Heart Rate Source BP Location FiO2 (%) Pain Score    01/31/25 1505 01/31/25 1505 01/31/25 1505 -- --    Tympanic Monitor Left arm        Vitals      Date and Time Temp Pulse SpO2 Resp BP Pain Score FACES Pain Rating User   01/31/25 1600 -- 85 99 % 22 149/77 -- -- [place]   01/31/25 1505 97.6 °F (36.4 °C) 68 95 % 16 143/63 -- -- [place]            Physical Exam  Vitals and nursing note reviewed.   Constitutional:       General: She is awake. She is not in acute distress.     Appearance: Normal appearance. She is well-developed and well-groomed. She is not ill-appearing, toxic-appearing or diaphoretic.      Comments: Patient is resting comfortably on bed, in no acute distress.  Family at bedside.   HENT:      Head: Normocephalic and atraumatic.      Right Ear: External ear normal.      Left Ear: External ear normal.      Nose: Nose normal.      Mouth/Throat:      Lips: Pink.   Eyes:      General: Lids are normal.      Extraocular Movements: Extraocular movements intact.      Conjunctiva/sclera: Conjunctivae normal.   Cardiovascular:      Rate and  Rhythm: Normal rate.      Heart sounds: Normal heart sounds. No murmur heard.  Pulmonary:      Effort: Pulmonary effort is normal. No tachypnea or respiratory distress.      Breath sounds: Normal breath sounds and air entry. No stridor. No decreased breath sounds, wheezing, rhonchi or rales.   Abdominal:      General: Abdomen is flat. Bowel sounds are normal. There is no distension.      Palpations: Abdomen is soft. There is no mass.      Tenderness: There is no abdominal tenderness. There is no guarding or rebound.   Musculoskeletal:         General: No swelling. Normal range of motion.      Cervical back: Normal range of motion.   Skin:     General: Skin is warm and dry.      Coloration: Skin is not pale.   Neurological:      Mental Status: She is alert. Mental status is at baseline.      Comments: Patient has a history of dementia but is reported to be at baseline per family member.   Psychiatric:         Behavior: Behavior is cooperative.         Results Reviewed       Procedure Component Value Units Date/Time    Comprehensive metabolic panel [[id number]]  (Abnormal) Collected: 01/31/25 1615    Lab Status: Final result Specimen: Blood from Arm, Right Updated: 01/31/25 1635     Sodium 138 mmol/L      Potassium 4.7 mmol/L      Chloride 107 mmol/L      CO2 25 mmol/L      ANION GAP 6 mmol/L      BUN 38 mg/dL      Creatinine 0.60 mg/dL      Glucose 98 mg/dL      Calcium 10.1 mg/dL      AST 36 U/L      ALT 35 U/L      Alkaline Phosphatase 129 U/L      Total Protein 7.2 g/dL      Albumin 4.2 g/dL      Total Bilirubin 0.53 mg/dL      eGFR 82 ml/min/1.73sq m     Narrative:      National Kidney Disease Foundation guidelines for Chronic Kidney Disease (CKD):     Stage 1 with normal or high GFR (GFR > 90 mL/min/1.73 square meters)    Stage 2 Mild CKD (GFR = 60-89 mL/min/1.73 square meters)    Stage 3A Moderate CKD (GFR = 45-59 mL/min/1.73 square meters)    Stage 3B Moderate CKD (GFR = 30-44 mL/min/1.73 square meters)     Stage 4 Severe CKD (GFR = 15-29 mL/min/1.73 square meters)    Stage 5 End Stage CKD (GFR <15 mL/min/1.73 square meters)  Note: GFR calculation is accurate only with a steady state creatinine    CBC and differential [[id number]]  (Abnormal) Collected: 01/31/25 1615    Lab Status: Final result Specimen: Blood from Arm, Right Updated: 01/31/25 1623     WBC 5.19 Thousand/uL      RBC 3.82 Million/uL      Hemoglobin 13.1 g/dL      Hematocrit 38.4 %       fL      MCH 34.3 pg      MCHC 34.1 g/dL      RDW 14.6 %      MPV 9.9 fL      Platelets 198 Thousands/uL      nRBC 0 /100 WBCs      Segmented % 63 %      Immature Grans % 1 %      Lymphocytes % 24 %      Monocytes % 11 %      Eosinophils Relative 1 %      Basophils Relative 0 %      Absolute Neutrophils 3.27 Thousands/µL      Absolute Immature Grans 0.03 Thousand/uL      Absolute Lymphocytes 1.25 Thousands/µL      Absolute Monocytes 0.59 Thousand/µL      Eosinophils Absolute 0.03 Thousand/µL      Basophils Absolute 0.02 Thousands/µL             No orders to display       Procedures    ED Medication and Procedure Management   Prior to Admission Medications   Prescriptions Last Dose Informant Patient Reported? Taking?   Incontinence Supplies (Wings Incontinence Pants S/M) MISC  Family Member, Care Giver No No   Sig: Use 6 (six) times a day   ciclopirox (LOPROX) 0.77 % cream  Family Member, Care Giver No No   Sig: Apply 2x/day to rash   clotrimazole-betamethasone (LOTRISONE) 1-0.05 % cream  Family Member, Care Giver No No   Sig: Apply topically 2 (two) times a day   Patient not taking: Reported on 12/12/2024   enoxaparin (Lovenox) 40 mg/0.4 mL  Family Member, Care Giver No No   Sig: Inject 0.4 mL (40 mg total) under the skin in the morning for 27 days   senna-docusate sodium (SENOKOT-S) 8.6-50 mg per tablet  Family Member, Care Giver No No   Sig: Take 1 tablet by mouth daily   terbinafine (LamISIL) 1 % cream  Family Member, Care Giver No No   Sig: Apply topically 2  (two) times a day Apply twice daily to the affected areas on the lower extremities for 2 weeks      Facility-Administered Medications: None     Discharge Medication List as of 1/31/2025  4:57 PM        CONTINUE these medications which have NOT CHANGED    Details   ciclopirox (LOPROX) 0.77 % cream Apply 2x/day to rash, Normal      clotrimazole-betamethasone (LOTRISONE) 1-0.05 % cream Apply topically 2 (two) times a day, Starting Thu 10/31/2024, Normal      enoxaparin (Lovenox) 40 mg/0.4 mL Inject 0.4 mL (40 mg total) under the skin in the morning for 27 days, Starting Wed 11/27/2024, Until Tue 12/24/2024, Normal      Incontinence Supplies (Wings Incontinence Pants S/M) MISC Use 6 (six) times a day, Starting Fri 9/27/2024, Print      senna-docusate sodium (SENOKOT-S) 8.6-50 mg per tablet Take 1 tablet by mouth daily, Starting Wed 11/27/2024, Normal      terbinafine (LamISIL) 1 % cream Apply topically 2 (two) times a day Apply twice daily to the affected areas on the lower extremities for 2 weeks, Starting Mon 12/2/2024, Normal             ED SEPSIS DOCUMENTATION   Time reflects when diagnosis was documented in both MDM as applicable and the Disposition within this note       Time User Action Codes Description Comment    1/31/2025  4:55 PM Braeden Gonzalez Add [N93.9] Vaginal bleeding                  Braeden Gonzalez PA-C  01/31/25 2049

## 2025-01-31 NOTE — DISCHARGE INSTRUCTIONS
You have been evaluated in the Emergency Department today for your vaginal bleeding. Your evaluation suggests that your symptoms have caused any anemia.  Please follow up with your primary care physician within two days.  Please follow up with your OB/Gyn for uterine US and further evaluation.  Return to the Emergency Department if you experience worsening or uncontrolled bleeding, shortness of breath, feeling lightheaded, chest tightness, abdominal cramping, severe abdominal pain, fevers,vomiting, or for any other concerning symptoms.

## 2025-02-03 ENCOUNTER — DOCUMENTATION (OUTPATIENT)
Dept: HEMATOLOGY ONCOLOGY | Facility: CLINIC | Age: 85
End: 2025-02-03

## 2025-02-03 DIAGNOSIS — N93.9 VAGINAL BLEEDING: Primary | ICD-10-CM

## 2025-02-04 ENCOUNTER — OFFICE VISIT (OUTPATIENT)
Dept: OBGYN CLINIC | Facility: MEDICAL CENTER | Age: 85
End: 2025-02-04
Payer: MEDICARE

## 2025-02-04 VITALS — SYSTOLIC BLOOD PRESSURE: 102 MMHG | BODY MASS INDEX: 19.2 KG/M2 | WEIGHT: 130 LBS | DIASTOLIC BLOOD PRESSURE: 68 MMHG

## 2025-02-04 DIAGNOSIS — N95.0 PMB (POSTMENOPAUSAL BLEEDING): ICD-10-CM

## 2025-02-04 PROCEDURE — 99203 OFFICE O/P NEW LOW 30 MIN: CPT | Performed by: NURSE PRACTITIONER

## 2025-02-04 NOTE — PATIENT INSTRUCTIONS
Post menopausal bleeding is never considered normal.   Options for evaluation were discussed, including an endometrial biopsy and pelvic US.   Benefits, risks and alternatives of each discussed.   They want to abide by Suri's wishes and if the results returned as cancer, they would not continue with any further treatment.   They will consider options and let us know.   Return to office as needed.

## 2025-02-04 NOTE — PROGRESS NOTES
Assessment/Plan:  Post menopausal bleeding is never considered normal.   Options for evaluation were discussed, including an endometrial biopsy and pelvic US.   Benefits, risks and alternatives of each discussed.   They want to abide by Suri's wishes and if the results returned as cancer, they would not continue with any further treatment.   They will consider options and let us know.   Return to office as needed.       1. PMB (postmenopausal bleeding)  -     Ambulatory referral to Obstetrics / Gynecology             Subjective:      Patient ID: Suri Patel is a 84 y.o. female.    HPI    Suri Patel is a 84 y.o. female who is here today for a problem visit accompanied by her cousins, Shelby and Radha.   Suri lives in the home of her cousin/caregiver Shelby and Shelbys mom since 11/24 after her hip surgery (Right hip fracture from a fall).     Suri does not have children.   Suri has dementia and memory loss. She is verbal but unable to hold conversations.     She developed painless vaginal bleeding on 1/31/25 which persists today.   Evaluated in ED 1/31/25 and was suspected to have vaginal bleeding as her guaiac stool testing was negative for hemoccult.     Caretakers deny any vaginal trauma and just want to know if there is any easy treatment. They do not want any heroic or life saving measures. Suri is [employer] Witness and does not want any blood products. Blood refusal document in available in her EMR.     11/22/24 CT of chest abdomen and pelvis showed unremarkable reproductive organs.     The following portions of the patient's history were reviewed and updated as appropriate: allergies, current medications, past family history, past medical history, past social history, past surgical history, and problem list.    Review of Systems   Constitutional:  Negative for activity change, appetite change, chills, diaphoresis, fatigue, fever and unexpected weight change.        Information obtained from Radha and  Shelby   Gastrointestinal:  Negative for abdominal distention, abdominal pain, blood in stool, nausea and vomiting.   Genitourinary:  Positive for vaginal bleeding. Negative for difficulty urinating, dysuria, frequency, hematuria and pelvic pain.   Skin: Negative.    Hematological:  Negative for adenopathy.   Psychiatric/Behavioral:          Non verbal at this appt.          Objective:      /68 (BP Location: Left arm, Patient Position: Sitting, Cuff Size: Standard)   Wt 59 kg (130 lb) Comment: approx  BMI 19.20 kg/m²          Physical Exam  Vitals and nursing note reviewed.   Constitutional:       Appearance: Normal appearance. She is well-developed.   Genitourinary:     Labia:         Right: No rash, tenderness, lesion or injury.         Left: No rash, tenderness, lesion or injury.       Urethra: No urethral pain, urethral swelling or urethral lesion.      Comments: Vulvovaginal atrophy  Suspected urethral prolapse but Suri was unable to tolerate a speculum exam.She was unable to follow any directions.   Bimanual was unable to be thoroughly performed to render a complete assessment  No obvious signs of trauma.      Skin:     General: Skin is warm and dry.   Neurological:      Comments: Non verbal at this appt

## 2025-02-07 DIAGNOSIS — S72.90XA FEMUR FRACTURE (HCC): ICD-10-CM

## 2025-02-08 RX ORDER — SENNA AND DOCUSATE SODIUM 50; 8.6 MG/1; MG/1
1 TABLET, FILM COATED ORAL DAILY
Qty: 30 TABLET | Refills: 0 | Status: SHIPPED | OUTPATIENT
Start: 2025-02-08

## 2025-02-25 DIAGNOSIS — R21 RASH: ICD-10-CM

## 2025-02-25 RX ORDER — CLOTRIMAZOLE AND BETAMETHASONE DIPROPIONATE 10; .64 MG/G; MG/G
CREAM TOPICAL 2 TIMES DAILY
Qty: 90 G | Refills: 0 | Status: SHIPPED | OUTPATIENT
Start: 2025-02-25

## 2025-04-09 DIAGNOSIS — R21 RASH: ICD-10-CM

## 2025-04-10 RX ORDER — CLOTRIMAZOLE AND BETAMETHASONE DIPROPIONATE 10; .64 MG/G; MG/G
1 CREAM TOPICAL 2 TIMES DAILY
Qty: 90 G | Refills: 3 | Status: SHIPPED | OUTPATIENT
Start: 2025-04-10 | End: 2025-04-11 | Stop reason: SDUPTHER

## 2025-04-11 DIAGNOSIS — R21 RASH: ICD-10-CM

## 2025-04-11 RX ORDER — CLOTRIMAZOLE AND BETAMETHASONE DIPROPIONATE 10; .64 MG/G; MG/G
1 CREAM TOPICAL 2 TIMES DAILY
Qty: 90 G | Refills: 0 | Status: SHIPPED | OUTPATIENT
Start: 2025-04-11

## 2025-06-05 ENCOUNTER — APPOINTMENT (OUTPATIENT)
Dept: LAB | Facility: HOSPITAL | Age: 85
End: 2025-06-05
Payer: MEDICARE

## 2025-06-05 ENCOUNTER — RESULTS FOLLOW-UP (OUTPATIENT)
Dept: FAMILY MEDICINE CLINIC | Facility: CLINIC | Age: 85
End: 2025-06-05

## 2025-06-05 DIAGNOSIS — R50.9 FEVER, UNSPECIFIED FEVER CAUSE: Primary | ICD-10-CM

## 2025-06-05 DIAGNOSIS — R50.9 FEVER, UNSPECIFIED FEVER CAUSE: ICD-10-CM

## 2025-06-05 LAB
BACTERIA UR QL AUTO: ABNORMAL /HPF
BILIRUB UR QL STRIP: NEGATIVE
CLARITY UR: ABNORMAL
COLOR UR: ABNORMAL
GLUCOSE UR STRIP-MCNC: NEGATIVE MG/DL
HGB UR QL STRIP.AUTO: ABNORMAL
KETONES UR STRIP-MCNC: NEGATIVE MG/DL
LEUKOCYTE ESTERASE UR QL STRIP: ABNORMAL
NITRITE UR QL STRIP: POSITIVE
NON-SQ EPI CELLS URNS QL MICRO: ABNORMAL /HPF
PH UR STRIP.AUTO: 6.5 [PH]
PROT UR STRIP-MCNC: NEGATIVE MG/DL
RBC #/AREA URNS AUTO: ABNORMAL /HPF
SP GR UR STRIP.AUTO: 1.02 (ref 1–1.03)
UROBILINOGEN UR STRIP-ACNC: <2 MG/DL
WBC #/AREA URNS AUTO: ABNORMAL /HPF

## 2025-06-05 PROCEDURE — 87186 SC STD MICRODIL/AGAR DIL: CPT

## 2025-06-05 PROCEDURE — 87077 CULTURE AEROBIC IDENTIFY: CPT

## 2025-06-05 PROCEDURE — 87086 URINE CULTURE/COLONY COUNT: CPT

## 2025-06-05 PROCEDURE — 81001 URINALYSIS AUTO W/SCOPE: CPT

## 2025-06-05 RX ORDER — CEPHALEXIN 500 MG/1
500 CAPSULE ORAL EVERY 12 HOURS SCHEDULED
Qty: 10 CAPSULE | Refills: 0 | Status: SHIPPED | OUTPATIENT
Start: 2025-06-05 | End: 2025-06-10

## 2025-06-05 NOTE — TELEPHONE ENCOUNTER
Phone call back from Shelby who was read verbatim Dr Vincnet message. She is aware an antibiotic was sent to patient's pharmacy and will  for patient for her to start. She had no further questions.

## 2025-06-07 LAB — BACTERIA UR CULT: ABNORMAL

## 2025-06-09 NOTE — TELEPHONE ENCOUNTER
Relayed message about UA to patients caregiver :   Please let pt's caretakers know her urine did confirm an infection, but it should respond to the antibiotic we started for her     She states she feels better already and thanks Dr iVncent for her care

## 2025-07-22 DIAGNOSIS — R94.6 ABNORMAL RESULTS OF THYROID FUNCTION STUDIES: ICD-10-CM

## 2025-07-22 DIAGNOSIS — F03.918 DEMENTIA WITH OTHER BEHAVIORAL DISTURBANCE, UNSPECIFIED DEMENTIA SEVERITY, UNSPECIFIED DEMENTIA TYPE (HCC): Primary | ICD-10-CM

## 2025-07-22 PROBLEM — S72.91XA FEMUR FRACTURE, RIGHT (HCC): Status: RESOLVED | Noted: 2024-11-22 | Resolved: 2025-07-22

## 2025-07-22 PROBLEM — D62 ACUTE BLOOD LOSS ANEMIA: Status: RESOLVED | Noted: 2021-07-28 | Resolved: 2025-07-22

## 2025-07-29 ENCOUNTER — APPOINTMENT (OUTPATIENT)
Dept: LAB | Facility: CLINIC | Age: 85
End: 2025-07-29
Payer: MEDICARE

## 2025-07-29 DIAGNOSIS — R94.6 ABNORMAL RESULTS OF THYROID FUNCTION STUDIES: ICD-10-CM

## 2025-07-29 DIAGNOSIS — F03.918 DEMENTIA WITH OTHER BEHAVIORAL DISTURBANCE, UNSPECIFIED DEMENTIA SEVERITY, UNSPECIFIED DEMENTIA TYPE (HCC): ICD-10-CM

## 2025-07-29 LAB
ALBUMIN SERPL BCG-MCNC: 3.8 G/DL (ref 3.5–5)
ALP SERPL-CCNC: 99 U/L (ref 34–104)
ALT SERPL W P-5'-P-CCNC: 10 U/L (ref 7–52)
ANION GAP SERPL CALCULATED.3IONS-SCNC: 10 MMOL/L (ref 4–13)
AST SERPL W P-5'-P-CCNC: 22 U/L (ref 13–39)
BASOPHILS # BLD AUTO: 0.05 THOUSANDS/ÂΜL (ref 0–0.1)
BASOPHILS NFR BLD AUTO: 1 % (ref 0–1)
BILIRUB SERPL-MCNC: 0.74 MG/DL (ref 0.2–1)
BUN SERPL-MCNC: 29 MG/DL (ref 5–25)
CALCIUM SERPL-MCNC: 9.7 MG/DL (ref 8.4–10.2)
CHLORIDE SERPL-SCNC: 107 MMOL/L (ref 96–108)
CO2 SERPL-SCNC: 25 MMOL/L (ref 21–32)
CREAT SERPL-MCNC: 0.74 MG/DL (ref 0.6–1.3)
EOSINOPHIL # BLD AUTO: 0.13 THOUSAND/ÂΜL (ref 0–0.61)
EOSINOPHIL NFR BLD AUTO: 3 % (ref 0–6)
ERYTHROCYTE [DISTWIDTH] IN BLOOD BY AUTOMATED COUNT: 15 % (ref 11.6–15.1)
GFR SERPL CREATININE-BSD FRML MDRD: 73 ML/MIN/1.73SQ M
GLUCOSE P FAST SERPL-MCNC: 77 MG/DL (ref 65–99)
HCT VFR BLD AUTO: 37.2 % (ref 34.8–46.1)
HGB BLD-MCNC: 12.4 G/DL (ref 11.5–15.4)
IMM GRANULOCYTES # BLD AUTO: 0.01 THOUSAND/UL (ref 0–0.2)
IMM GRANULOCYTES NFR BLD AUTO: 0 % (ref 0–2)
LYMPHOCYTES # BLD AUTO: 1.39 THOUSANDS/ÂΜL (ref 0.6–4.47)
LYMPHOCYTES NFR BLD AUTO: 29 % (ref 14–44)
MCH RBC QN AUTO: 34.2 PG (ref 26.8–34.3)
MCHC RBC AUTO-ENTMCNC: 33.3 G/DL (ref 31.4–37.4)
MCV RBC AUTO: 103 FL (ref 82–98)
MONOCYTES # BLD AUTO: 0.52 THOUSAND/ÂΜL (ref 0.17–1.22)
MONOCYTES NFR BLD AUTO: 11 % (ref 4–12)
NEUTROPHILS # BLD AUTO: 2.66 THOUSANDS/ÂΜL (ref 1.85–7.62)
NEUTS SEG NFR BLD AUTO: 56 % (ref 43–75)
NRBC BLD AUTO-RTO: 0 /100 WBCS
PLATELET # BLD AUTO: 186 THOUSANDS/UL (ref 149–390)
PMV BLD AUTO: 11.3 FL (ref 8.9–12.7)
POTASSIUM SERPL-SCNC: 4.5 MMOL/L (ref 3.5–5.3)
PROT SERPL-MCNC: 6.5 G/DL (ref 6.4–8.4)
RBC # BLD AUTO: 3.63 MILLION/UL (ref 3.81–5.12)
SODIUM SERPL-SCNC: 142 MMOL/L (ref 135–147)
T4 FREE SERPL-MCNC: 0.87 NG/DL (ref 0.61–1.12)
TSH SERPL DL<=0.05 MIU/L-ACNC: 5.23 UIU/ML (ref 0.45–4.5)
WBC # BLD AUTO: 4.76 THOUSAND/UL (ref 4.31–10.16)

## 2025-07-29 PROCEDURE — 84443 ASSAY THYROID STIM HORMONE: CPT

## 2025-07-29 PROCEDURE — 80053 COMPREHEN METABOLIC PANEL: CPT

## 2025-07-29 PROCEDURE — 36415 COLL VENOUS BLD VENIPUNCTURE: CPT

## 2025-07-29 PROCEDURE — 85025 COMPLETE CBC W/AUTO DIFF WBC: CPT

## 2025-07-29 PROCEDURE — 84439 ASSAY OF FREE THYROXINE: CPT

## 2025-08-12 PROBLEM — I67.1 INTRACRANIAL ANEURYSM: Status: ACTIVE | Noted: 2025-08-12

## 2025-08-13 ENCOUNTER — OFFICE VISIT (OUTPATIENT)
Dept: FAMILY MEDICINE CLINIC | Facility: CLINIC | Age: 85
End: 2025-08-13
Payer: MEDICARE

## (undated) DEVICE — MAT ABSORBANT ARTHROSCOPY FLOOR 46 X 40 IN

## (undated) DEVICE — DRAPE C-ARMOUR

## (undated) DEVICE — PAD CAST 4 IN COTTON NON STERILE

## (undated) DEVICE — SPONGE SCRUB 4 PCT CHLORHEXIDINE

## (undated) DEVICE — DRAPE SURGIKIT SADDLE BAG

## (undated) DEVICE — SUT VICRYL PLUS 0 CTB-1 27 IN VCPB260H

## (undated) DEVICE — DRESSING MEPILEX AG BORDER 4 X 4 IN

## (undated) DEVICE — 3.2MM GUIDE WIRE 400MM

## (undated) DEVICE — 6617 IOBAN II PATIENT ISOLATION DRAPE 5/BX,4BX/CS: Brand: STERI-DRAPE™ IOBAN™ 2

## (undated) DEVICE — SUT VICRYL PLUS 2-0 CTB-1 27 IN VCPB259H

## (undated) DEVICE — PLUMEPEN PRO 10FT

## (undated) DEVICE — 3M™ TEGADERM™ TRANSPARENT FILM DRESSING FRAME STYLE, 1628, 6 IN X 8 IN (15 CM X 20 CM), 10/CT 8CT/CASE: Brand: 3M™ TEGADERM™

## (undated) DEVICE — 4-PORT MANIFOLD: Brand: NEPTUNE 2

## (undated) DEVICE — POSITIONER HANA TABLE PACK

## (undated) DEVICE — 4.2MM THREE-FLUTED DRILL BIT QC/330MM/100MM CALIBRATION

## (undated) DEVICE — CHLORAPREP HI-LITE 26ML ORANGE

## (undated) DEVICE — ABDOMINAL PAD: Brand: DERMACEA

## (undated) DEVICE — STERILE ORIF HIP PACK: Brand: CARDINAL HEALTH

## (undated) DEVICE — PAD GROUNDING DUAL ADULT

## (undated) DEVICE — DRESSING MEPILEX AG 4 X 5 IN

## (undated) DEVICE — GLOVE INDICATOR PI UNDERGLOVE SZ 7.5 BLUE

## (undated) DEVICE — GLOVE SRG BIOGEL 7.5